# Patient Record
Sex: FEMALE | Race: WHITE | NOT HISPANIC OR LATINO
[De-identification: names, ages, dates, MRNs, and addresses within clinical notes are randomized per-mention and may not be internally consistent; named-entity substitution may affect disease eponyms.]

---

## 2017-03-27 ENCOUNTER — APPOINTMENT (OUTPATIENT)
Dept: DERMATOLOGY | Facility: CLINIC | Age: 32
End: 2017-03-27

## 2017-12-22 ENCOUNTER — RESULT REVIEW (OUTPATIENT)
Age: 32
End: 2017-12-22

## 2019-02-06 ENCOUNTER — OUTPATIENT (OUTPATIENT)
Dept: OUTPATIENT SERVICES | Facility: HOSPITAL | Age: 34
LOS: 1 days | End: 2019-02-06
Payer: COMMERCIAL

## 2019-02-06 VITALS
HEIGHT: 64.5 IN | WEIGHT: 128.53 LBS | HEART RATE: 54 BPM | RESPIRATION RATE: 20 BRPM | SYSTOLIC BLOOD PRESSURE: 85 MMHG | DIASTOLIC BLOOD PRESSURE: 52 MMHG | TEMPERATURE: 97 F

## 2019-02-06 DIAGNOSIS — Z01.818 ENCOUNTER FOR OTHER PREPROCEDURAL EXAMINATION: ICD-10-CM

## 2019-02-06 DIAGNOSIS — Z29.9 ENCOUNTER FOR PROPHYLACTIC MEASURES, UNSPECIFIED: ICD-10-CM

## 2019-02-06 DIAGNOSIS — Z98.890 OTHER SPECIFIED POSTPROCEDURAL STATES: Chronic | ICD-10-CM

## 2019-02-06 DIAGNOSIS — O02.1 MISSED ABORTION: ICD-10-CM

## 2019-02-06 LAB
ANION GAP SERPL CALC-SCNC: 13 MMOL/L — SIGNIFICANT CHANGE UP (ref 5–17)
APTT BLD: 36.4 SEC — HIGH (ref 27.5–36.3)
BASOPHILS # BLD AUTO: 0 K/UL — SIGNIFICANT CHANGE UP (ref 0–0.2)
BASOPHILS NFR BLD AUTO: 0.3 % — SIGNIFICANT CHANGE UP (ref 0–2)
BLD GP AB SCN SERPL QL: SIGNIFICANT CHANGE UP
BUN SERPL-MCNC: 8 MG/DL — SIGNIFICANT CHANGE UP (ref 8–20)
CALCIUM SERPL-MCNC: 9.7 MG/DL — SIGNIFICANT CHANGE UP (ref 8.6–10.2)
CHLORIDE SERPL-SCNC: 102 MMOL/L — SIGNIFICANT CHANGE UP (ref 98–107)
CO2 SERPL-SCNC: 25 MMOL/L — SIGNIFICANT CHANGE UP (ref 22–29)
COMMENT - BLOOD BANK: SIGNIFICANT CHANGE UP
CREAT SERPL-MCNC: 0.52 MG/DL — SIGNIFICANT CHANGE UP (ref 0.5–1.3)
EOSINOPHIL # BLD AUTO: 0.1 K/UL — SIGNIFICANT CHANGE UP (ref 0–0.5)
EOSINOPHIL NFR BLD AUTO: 1.1 % — SIGNIFICANT CHANGE UP (ref 0–6)
GLUCOSE SERPL-MCNC: 76 MG/DL — SIGNIFICANT CHANGE UP (ref 70–115)
HCT VFR BLD CALC: 37.7 % — SIGNIFICANT CHANGE UP (ref 37–47)
HGB BLD-MCNC: 12.3 G/DL — SIGNIFICANT CHANGE UP (ref 12–16)
INR BLD: 1.05 RATIO — SIGNIFICANT CHANGE UP (ref 0.88–1.16)
LYMPHOCYTES # BLD AUTO: 1.1 K/UL — SIGNIFICANT CHANGE UP (ref 1–4.8)
LYMPHOCYTES # BLD AUTO: 17.1 % — LOW (ref 20–55)
MCHC RBC-ENTMCNC: 29.8 PG — SIGNIFICANT CHANGE UP (ref 27–31)
MCHC RBC-ENTMCNC: 32.6 G/DL — SIGNIFICANT CHANGE UP (ref 32–36)
MCV RBC AUTO: 91.3 FL — SIGNIFICANT CHANGE UP (ref 81–99)
MONOCYTES # BLD AUTO: 0.5 K/UL — SIGNIFICANT CHANGE UP (ref 0–0.8)
MONOCYTES NFR BLD AUTO: 8.2 % — SIGNIFICANT CHANGE UP (ref 3–10)
NEUTROPHILS # BLD AUTO: 4.7 K/UL — SIGNIFICANT CHANGE UP (ref 1.8–8)
NEUTROPHILS NFR BLD AUTO: 73.1 % — HIGH (ref 37–73)
PLATELET # BLD AUTO: 322 K/UL — SIGNIFICANT CHANGE UP (ref 150–400)
POTASSIUM SERPL-MCNC: 3.9 MMOL/L — SIGNIFICANT CHANGE UP (ref 3.5–5.3)
POTASSIUM SERPL-SCNC: 3.9 MMOL/L — SIGNIFICANT CHANGE UP (ref 3.5–5.3)
PROTHROM AB SERPL-ACNC: 12.1 SEC — SIGNIFICANT CHANGE UP (ref 10–12.9)
RBC # BLD: 4.13 M/UL — LOW (ref 4.4–5.2)
RBC # FLD: 13.2 % — SIGNIFICANT CHANGE UP (ref 11–15.6)
SODIUM SERPL-SCNC: 140 MMOL/L — SIGNIFICANT CHANGE UP (ref 135–145)
TYPE + AB SCN PNL BLD: SIGNIFICANT CHANGE UP
WBC # BLD: 6.4 K/UL — SIGNIFICANT CHANGE UP (ref 4.8–10.8)
WBC # FLD AUTO: 6.4 K/UL — SIGNIFICANT CHANGE UP (ref 4.8–10.8)

## 2019-02-06 PROCEDURE — 85730 THROMBOPLASTIN TIME PARTIAL: CPT

## 2019-02-06 PROCEDURE — 80048 BASIC METABOLIC PNL TOTAL CA: CPT

## 2019-02-06 PROCEDURE — 85610 PROTHROMBIN TIME: CPT

## 2019-02-06 PROCEDURE — 85027 COMPLETE CBC AUTOMATED: CPT

## 2019-02-06 PROCEDURE — 86900 BLOOD TYPING SEROLOGIC ABO: CPT

## 2019-02-06 PROCEDURE — G0463: CPT

## 2019-02-06 PROCEDURE — 86850 RBC ANTIBODY SCREEN: CPT

## 2019-02-06 PROCEDURE — 86901 BLOOD TYPING SEROLOGIC RH(D): CPT

## 2019-02-06 PROCEDURE — 36415 COLL VENOUS BLD VENIPUNCTURE: CPT

## 2019-02-06 RX ORDER — SODIUM CHLORIDE 9 MG/ML
3 INJECTION INTRAMUSCULAR; INTRAVENOUS; SUBCUTANEOUS ONCE
Refills: 0 | Status: DISCONTINUED | OUTPATIENT
Start: 2019-02-11 | End: 2019-02-11

## 2019-02-06 NOTE — H&P PST ADULT - ASSESSMENT
OPIOID RISK TOOL    CHAYITO EACH BOX THAT APPLIES AND ADD TOTALS AT THE END    FAMILY HISTORY OF SUBSTANCE ABUSE                 FEMALE         MALE                                                Alcohol                             [  ]1 pt          [  ]3pts                                               Illegal Drugs                     [  ]2 pts        [  ]3pts                                               Rx Drugs                           [  ]4 pts        [  ]4 pts    PERSONAL HISTORY OF SUBSTANCE ABUSE                                                                                          Alcohol                             [  ]3 pts       [  ]3 pts                                               Illegal Drugs                     [  ]4 pts        [  ]4 pts                                               Rx Drugs                           [  ]5 pts        [  ]5 pts    AGE BETWEEN 16-45 YEARS                                      [ x ]1 pt         [  ]1 pt    HISTORY OF PREADOLESCENT   SEXUAL ABUSE                                                             [  ]3 pts        [  ]0pts    PSYCHOLOGICAL DISEASE                     ADD, OCD, Bipolar, Schizophrenia        [  ]2 pts         [  ]2 pts                      Depression                                               [  ]1 pt           [  ]1 pt           SCORING TOTAL   (add numbers and type here)              (1)                                     A score of 3 or lower indicated LOW risk for future opioid abuse  A score of 4 to 7 indicated moderate risk for future opioid abuse  A score of 8 or higher indicates a high risk for opioid abuse Pt is a 33 y.o female with no significant PMH of suction dilation and curettage. Hold any medications containing ibuprofen and aspirin 1 week prior to surgery.  CAPRINI VTE 2.0 SCORE [CLOT updated 2019]    AGE RELATED RISK FACTORS                                                       MOBILITY RELATED FACTORS  [ ] Age 41-60 years                                            (1 Point)                    [ ] Bed rest                                                        (1 Point)  [ ] Age: 61-74 years                                           (2 Points)                  [ ] Plaster cast                                                   (2 Points)  [ ] Age= 75 years                                              (3 Points)                    [ ] Bed bound for more than 72 hours                 (2 Points)    DISEASE RELATED RISK FACTORS                                               GENDER SPECIFIC FACTORS  [ ] Edema in the lower extremities                       (1 Point)              [x ] Pregnancy                                                     (1 Point)  [ ] Varicose veins                                               (1 Point)                     [ ] Post-partum < 6 weeks                                   (1 Point)             [ ] BMI > 25 Kg/m2                                            (1 Point)                     [ ] Hormonal therapy  or oral contraception          (1 Point)                 [ ] Sepsis (in the previous month)                        (1 Point)               [ ] History of pregnancy complications                 (1 point)  [ ] Pneumonia or serious lung disease                                               [ ] Unexplained or recurrent                     (1 Point)           (in the previous month)                               (1 Point)  [ ] Abnormal pulmonary function test                     (1 Point)                 SURGERY RELATED RISK FACTORS  [ ] Acute myocardial infarction                              (1 Point)               [ ]  Section                                             (1 Point)  [ ] Congestive heart failure (in the previous month)  (1 Point)      [x ] Minor surgery                                                  (1 Point)   [ ] Inflammatory bowel disease                             (1 Point)               [ ] Arthroscopic surgery                                        (2 Points)  [ ] Central venous access                                      (2 Points)                [ ] General surgery lasting more than 45 minutes (2 points)  [ ] Malignancy- Present or previous                   (2 Points)                [ ] Elective arthroplasty                                         (5 points)    [ ] Stroke (in the previous month)                          (5 Points)                                                                                                                                                           HEMATOLOGY RELATED FACTORS                                                 TRAUMA RELATED RISK FACTORS  [ ] Prior episodes of VTE                                     (3 Points)                [ ] Fracture of the hip, pelvis, or leg                       (5 Points)  [ ] Positive family history for VTE                         (3 Points)             [ ] Acute spinal cord injury (in the previous month)  (5 Points)  [ ] Prothrombin 81156 A                                     (3 Points)               [ ] Paralysis  (less than 1 month)                             (5 Points)  [ ] Factor V Leiden                                             (3 Points)                  [ ] Multiple Trauma within 1 month                        (5 Points)  [ ] Lupus anticoagulants                                     (3 Points)                                                           [ ] Anticardiolipin antibodies                               (3 Points)                                                       [ ] High homocysteine in the blood                      (3 Points)                                             [ ] Other congenital or acquired thrombophilia      (3 Points)                                                [ ] Heparin induced thrombocytopenia                  (3 Points)                                     Total Score [    2      ]  OPIOID RISK TOOL    CHAYITO EACH BOX THAT APPLIES AND ADD TOTALS AT THE END    FAMILY HISTORY OF SUBSTANCE ABUSE                 FEMALE         MALE                                                Alcohol                             [  ]1 pt          [  ]3pts                                               Illegal Drugs                     [  ]2 pts        [  ]3pts                                               Rx Drugs                           [  ]4 pts        [  ]4 pts    PERSONAL HISTORY OF SUBSTANCE ABUSE                                                                                          Alcohol                             [  ]3 pts       [  ]3 pts                                               Illegal Drugs                     [  ]4 pts        [  ]4 pts                                               Rx Drugs                           [  ]5 pts        [  ]5 pts    AGE BETWEEN 16-45 YEARS                                      [ x ]1 pt         [  ]1 pt    HISTORY OF PREADOLESCENT   SEXUAL ABUSE                                                             [  ]3 pts        [  ]0pts    PSYCHOLOGICAL DISEASE                     ADD, OCD, Bipolar, Schizophrenia        [  ]2 pts         [  ]2 pts                      Depression                                               [  ]1 pt           [  ]1 pt           SCORING TOTAL   (add numbers and type here)              (1)                                     A score of 3 or lower indicated LOW risk for future opioid abuse  A score of 4 to 7 indicated moderate risk for future opioid abuse  A score of 8 or higher indicates a high risk for opioid abuse

## 2019-02-06 NOTE — H&P PST ADULT - HISTORY OF PRESENT ILLNESS
Pt is a 33 y.o female  which is measuring 7 weeks gestation on ultrasound instead of 9 weeks now schedule for suction dilation and curettage.

## 2019-02-06 NOTE — ASU PATIENT PROFILE, ADULT - LEARNING ASSESSMENT (PATIENT) ADDITIONAL COMMENTS
Instructed pt on pre-op instructions, tips for safer surgery, pain management scale, understanding of all instructions verbalized.

## 2019-02-10 ENCOUNTER — TRANSCRIPTION ENCOUNTER (OUTPATIENT)
Age: 34
End: 2019-02-10

## 2019-02-11 ENCOUNTER — RESULT REVIEW (OUTPATIENT)
Age: 34
End: 2019-02-11

## 2019-02-11 ENCOUNTER — OUTPATIENT (OUTPATIENT)
Dept: OUTPATIENT SERVICES | Facility: HOSPITAL | Age: 34
LOS: 1 days | End: 2019-02-11
Payer: COMMERCIAL

## 2019-02-11 VITALS
RESPIRATION RATE: 16 BRPM | WEIGHT: 126.99 LBS | TEMPERATURE: 97 F | OXYGEN SATURATION: 100 % | HEIGHT: 62 IN | DIASTOLIC BLOOD PRESSURE: 50 MMHG | HEART RATE: 66 BPM | SYSTOLIC BLOOD PRESSURE: 104 MMHG

## 2019-02-11 VITALS
RESPIRATION RATE: 18 BRPM | HEART RATE: 65 BPM | DIASTOLIC BLOOD PRESSURE: 60 MMHG | OXYGEN SATURATION: 100 % | SYSTOLIC BLOOD PRESSURE: 101 MMHG

## 2019-02-11 DIAGNOSIS — O02.1 MISSED ABORTION: ICD-10-CM

## 2019-02-11 DIAGNOSIS — Z01.818 ENCOUNTER FOR OTHER PREPROCEDURAL EXAMINATION: ICD-10-CM

## 2019-02-11 DIAGNOSIS — Z98.890 OTHER SPECIFIED POSTPROCEDURAL STATES: Chronic | ICD-10-CM

## 2019-02-11 LAB
TYPE + AB SCN PNL BLD: SIGNIFICANT CHANGE UP
TYPE + AB SCN PNL BLD: SIGNIFICANT CHANGE UP

## 2019-02-11 PROCEDURE — 86901 BLOOD TYPING SEROLOGIC RH(D): CPT

## 2019-02-11 PROCEDURE — 59820 CARE OF MISCARRIAGE: CPT

## 2019-02-11 PROCEDURE — 88305 TISSUE EXAM BY PATHOLOGIST: CPT | Mod: 26

## 2019-02-11 PROCEDURE — 88305 TISSUE EXAM BY PATHOLOGIST: CPT

## 2019-02-11 PROCEDURE — 86900 BLOOD TYPING SEROLOGIC ABO: CPT

## 2019-02-11 PROCEDURE — 86850 RBC ANTIBODY SCREEN: CPT

## 2019-02-11 RX ORDER — SODIUM CHLORIDE 9 MG/ML
1000 INJECTION, SOLUTION INTRAVENOUS
Refills: 0 | Status: DISCONTINUED | OUTPATIENT
Start: 2019-02-11 | End: 2019-02-11

## 2019-02-11 RX ORDER — METOCLOPRAMIDE HCL 10 MG
10 TABLET ORAL ONCE
Refills: 0 | Status: DISCONTINUED | OUTPATIENT
Start: 2019-02-11 | End: 2019-02-11

## 2019-02-11 RX ORDER — FENTANYL CITRATE 50 UG/ML
50 INJECTION INTRAVENOUS
Refills: 0 | Status: DISCONTINUED | OUTPATIENT
Start: 2019-02-11 | End: 2019-02-11

## 2019-02-11 RX ORDER — FENTANYL CITRATE 50 UG/ML
25 INJECTION INTRAVENOUS
Refills: 0 | Status: DISCONTINUED | OUTPATIENT
Start: 2019-02-11 | End: 2019-02-11

## 2019-02-11 RX ORDER — ONDANSETRON 8 MG/1
4 TABLET, FILM COATED ORAL ONCE
Refills: 0 | Status: DISCONTINUED | OUTPATIENT
Start: 2019-02-11 | End: 2019-02-11

## 2019-02-11 RX ORDER — OXYCODONE AND ACETAMINOPHEN 5; 325 MG/1; MG/1
1 TABLET ORAL
Qty: 8 | Refills: 0
Start: 2019-02-11 | End: 2019-02-12

## 2019-02-11 RX ORDER — IBUPROFEN 200 MG
1 TABLET ORAL
Qty: 28 | Refills: 0
Start: 2019-02-11 | End: 2019-02-17

## 2019-02-11 NOTE — BRIEF OPERATIVE NOTE - PROCEDURE
<<-----Click on this checkbox to enter Procedure Dilation and curettage of uterus for missed   2019    Active  [id number]

## 2019-02-11 NOTE — ASU DISCHARGE PLAN (ADULT/PEDIATRIC). - ACTIVITY LEVEL
exercise/no heavy lifting/weight bearing as tolerated/nothing per rectum/nothing per vagina/no tub baths/no douching/no tampons/no intercourse

## 2019-02-11 NOTE — ASU DISCHARGE PLAN (ADULT/PEDIATRIC). - NOTIFY
Bleeding that does not stop/Swelling that continues/Numbness, color, or temperature change to extremity/Persistent Nausea and Vomiting/Unable to Urinate/Pain not relieved by Medications/GYN Fever>100.4/Inability to Tolerate Liquids or Foods/Increased Irritability or Sluggishness

## 2019-02-11 NOTE — ASU DISCHARGE PLAN (ADULT/PEDIATRIC). - MEDICATION SUMMARY - MEDICATIONS TO TAKE
I will START or STAY ON the medications listed below when I get home from the hospital:    ibuprofen 600 mg oral tablet  -- 1 tab(s) by mouth every 6 hours, As Needed -for mild pain   -- Do not take this drug if you are pregnant.  It is very important that you take or use this exactly as directed.  Do not skip doses or discontinue unless directed by your doctor.  May cause drowsiness or dizziness.  Obtain medical advice before taking any non-prescription drugs as some may affect the action of this medication.  Take with food or milk.    -- Indication: For Mild pain    oxyCODONE-acetaminophen 5 mg-325 mg oral tablet  -- 1 tab(s) by mouth every 6 hours, As Needed -for moderate pain MDD:4 tabs  -- Caution federal law prohibits the transfer of this drug to any person other  than the person for whom it was prescribed.  May cause drowsiness.  Alcohol may intensify this effect.  Use care when operating dangerous machinery.  This prescription cannot be refilled.  This product contains acetaminophen.  Do not use  with any other product containing acetaminophen to prevent possible liver damage.  Using more of this medication than prescribed may cause serious breathing problems.    -- Indication: For severe pain

## 2019-02-13 LAB
SURGICAL PATHOLOGY STUDY: SIGNIFICANT CHANGE UP

## 2019-07-26 ENCOUNTER — RESULT REVIEW (OUTPATIENT)
Age: 34
End: 2019-07-26

## 2019-11-08 ENCOUNTER — OUTPATIENT (OUTPATIENT)
Dept: OUTPATIENT SERVICES | Facility: HOSPITAL | Age: 34
LOS: 1 days | End: 2019-11-08
Payer: COMMERCIAL

## 2019-11-08 VITALS — HEART RATE: 77 BPM | SYSTOLIC BLOOD PRESSURE: 112 MMHG | DIASTOLIC BLOOD PRESSURE: 56 MMHG

## 2019-11-08 VITALS — DIASTOLIC BLOOD PRESSURE: 56 MMHG | HEART RATE: 83 BPM | SYSTOLIC BLOOD PRESSURE: 110 MMHG

## 2019-11-08 DIAGNOSIS — Z98.890 OTHER SPECIFIED POSTPROCEDURAL STATES: Chronic | ICD-10-CM

## 2019-11-08 DIAGNOSIS — O47.03 FALSE LABOR BEFORE 37 COMPLETED WEEKS OF GESTATION, THIRD TRIMESTER: ICD-10-CM

## 2019-11-08 PROCEDURE — G0463: CPT

## 2019-11-08 PROCEDURE — 59025 FETAL NON-STRESS TEST: CPT

## 2019-11-08 NOTE — OB PROVIDER TRIAGE NOTE - HISTORY OF PRESENT ILLNESS
35yo  @ 28.3w who comes into triage with a complaint of decreased fetal movement. Pt reports no contractions, vaginal bleeding, loss of fluids. Pt reports +FM. Pregnancy has been uncomplicated.

## 2019-11-08 NOTE — OB PROVIDER TRIAGE NOTE - NSOBPROVIDERNOTE_OBGYN_ALL_OB_FT
35yo  here for decreased fetal movement. NST reactive. REviewed by Dr. Briggs. Plan reviewed with Dr. Cui. Pt can be discharged.

## 2019-11-08 NOTE — OB RN TRIAGE NOTE - PT NEEDS ASSIST
reviewed. Allergies: Patient has no known allergies. -------------------------------------------------- RESULTS -------------------------------------------------  No results found for this visit on 08/09/19. No orders to display       ------------------------- NURSING NOTES AND VITALS REVIEWED ---------------------------   The nursing notes within the ED encounter and vital signs as below have been reviewed. BP (!) 153/78   Pulse 88   Temp 97.3 °F (36.3 °C) (Oral)   Resp 20   Ht 5' 8\" (1.727 m)   Wt 185 lb (83.9 kg)   SpO2 95%   BMI 28.13 kg/m²   Oxygen Saturation Interpretation: Normal      ------------------------------------------ PROGRESS NOTES ------------------------------------------   I have spoken with the patient and discussed todays results, in addition to providing specific details for the plan of care and counseling regarding the diagnosis and prognosis. Their questions are answered at this time and they are agreeable with the plan.      --------------------------------- ADDITIONAL PROVIDER NOTES ---------------------------------        This patient is stable for discharge. I have shared the specific conditions for return, as well as the importance of follow-up. IMPRESSION:     1.  Parkinson catheter problem, initial encounter ([address]      Patient's Medications   New Prescriptions    No medications on file   Previous Medications    ASCORBIC ACID (VITAMIN C) 1000 MG TABLET    Take 1,000 mg by mouth daily    BLOOD GLUCOSE MONITORING SUPPL (ACURA BLOOD GLUCOSE METER) W/DEVICE KIT    1 Units by Does not apply route daily E11.9    CHOLECALCIFEROL (VITAMIN D3) 3000 UNITS TABS    Take 5,000 Units by mouth    LANCETS MISC    1 each by Does not apply route daily e11.9    METFORMIN (GLUCOPHAGE) 500 MG TABLET    Take 1 tablet by mouth 2 times daily (with meals)    MULTIPLE VITAMINS-MINERALS (THERAPEUTIC MULTIVITAMIN-MINERALS) TABLET    Take 1 tablet by mouth daily    OMEGA-3 FATTY ACIDS (OMEGA-3
no

## 2019-11-08 NOTE — OB RN TRIAGE NOTE - NS PRO PASSIVE SMOKE EXP
Patient presents to the clinic for follow up sore throat and fever.  Patient's mom states has been in twice over that last couple of weeks with negative strep tests.    ZANDER Ahmadi........................2/23/2018  10:11 AM     No

## 2019-12-06 ENCOUNTER — RESULT REVIEW (OUTPATIENT)
Age: 34
End: 2019-12-06

## 2020-01-28 ENCOUNTER — INPATIENT (INPATIENT)
Facility: HOSPITAL | Age: 35
LOS: 3 days | Discharge: HOME | End: 2020-02-01
Payer: COMMERCIAL

## 2020-01-28 VITALS — DIASTOLIC BLOOD PRESSURE: 55 MMHG | SYSTOLIC BLOOD PRESSURE: 123 MMHG | HEART RATE: 92 BPM

## 2020-01-28 DIAGNOSIS — Z98.890 OTHER SPECIFIED POSTPROCEDURAL STATES: Chronic | ICD-10-CM

## 2020-01-28 DIAGNOSIS — Z3A.40 40 WEEKS GESTATION OF PREGNANCY: ICD-10-CM

## 2020-01-28 DIAGNOSIS — O26.893 OTHER SPECIFIED PREGNANCY RELATED CONDITIONS, THIRD TRIMESTER: ICD-10-CM

## 2020-01-28 LAB
APPEARANCE UR: CLEAR — SIGNIFICANT CHANGE UP
BASOPHILS # BLD AUTO: 0.05 K/UL — SIGNIFICANT CHANGE UP (ref 0–0.2)
BASOPHILS NFR BLD AUTO: 0.5 % — SIGNIFICANT CHANGE UP (ref 0–2)
BILIRUB UR-MCNC: NEGATIVE — SIGNIFICANT CHANGE UP
BLD GP AB SCN SERPL QL: SIGNIFICANT CHANGE UP
COLOR SPEC: YELLOW — SIGNIFICANT CHANGE UP
DIFF PNL FLD: NEGATIVE — SIGNIFICANT CHANGE UP
EOSINOPHIL # BLD AUTO: 0.09 K/UL — SIGNIFICANT CHANGE UP (ref 0–0.5)
EOSINOPHIL NFR BLD AUTO: 0.9 % — SIGNIFICANT CHANGE UP (ref 0–6)
GLUCOSE UR QL: NEGATIVE MG/DL — SIGNIFICANT CHANGE UP
HCT VFR BLD CALC: 35.3 % — SIGNIFICANT CHANGE UP (ref 34.5–45)
HGB BLD-MCNC: 11.3 G/DL — LOW (ref 11.5–15.5)
IMM GRANULOCYTES NFR BLD AUTO: 0.5 % — SIGNIFICANT CHANGE UP (ref 0–1.5)
KETONES UR-MCNC: NEGATIVE — SIGNIFICANT CHANGE UP
LEUKOCYTE ESTERASE UR-ACNC: NEGATIVE — SIGNIFICANT CHANGE UP
LYMPHOCYTES # BLD AUTO: 1.23 K/UL — SIGNIFICANT CHANGE UP (ref 1–3.3)
LYMPHOCYTES # BLD AUTO: 12.1 % — LOW (ref 13–44)
MCHC RBC-ENTMCNC: 29.3 PG — SIGNIFICANT CHANGE UP (ref 27–34)
MCHC RBC-ENTMCNC: 32 GM/DL — SIGNIFICANT CHANGE UP (ref 32–36)
MCV RBC AUTO: 91.5 FL — SIGNIFICANT CHANGE UP (ref 80–100)
MONOCYTES # BLD AUTO: 1.22 K/UL — HIGH (ref 0–0.9)
MONOCYTES NFR BLD AUTO: 12 % — SIGNIFICANT CHANGE UP (ref 2–14)
NEUTROPHILS # BLD AUTO: 7.54 K/UL — HIGH (ref 1.8–7.4)
NEUTROPHILS NFR BLD AUTO: 74 % — SIGNIFICANT CHANGE UP (ref 43–77)
NITRITE UR-MCNC: NEGATIVE — SIGNIFICANT CHANGE UP
PH UR: 7 — SIGNIFICANT CHANGE UP (ref 5–8)
PLATELET # BLD AUTO: 379 K/UL — SIGNIFICANT CHANGE UP (ref 150–400)
PROT UR-MCNC: NEGATIVE MG/DL — SIGNIFICANT CHANGE UP
RBC # BLD: 3.86 M/UL — SIGNIFICANT CHANGE UP (ref 3.8–5.2)
RBC # FLD: 13.4 % — SIGNIFICANT CHANGE UP (ref 10.3–14.5)
SP GR SPEC: 1.01 — SIGNIFICANT CHANGE UP (ref 1.01–1.02)
UROBILINOGEN FLD QL: NEGATIVE MG/DL — SIGNIFICANT CHANGE UP
WBC # BLD: 10.18 K/UL — SIGNIFICANT CHANGE UP (ref 3.8–10.5)
WBC # FLD AUTO: 10.18 K/UL — SIGNIFICANT CHANGE UP (ref 3.8–10.5)

## 2020-01-28 RX ORDER — INFLUENZA VIRUS VACCINE 15; 15; 15; 15 UG/.5ML; UG/.5ML; UG/.5ML; UG/.5ML
0.5 SUSPENSION INTRAMUSCULAR ONCE
Refills: 0 | Status: COMPLETED | OUTPATIENT
Start: 2020-01-28 | End: 2020-01-31

## 2020-01-28 RX ORDER — OXYTOCIN 10 UNIT/ML
333.33 VIAL (ML) INJECTION
Qty: 20 | Refills: 0 | Status: DISCONTINUED | OUTPATIENT
Start: 2020-01-28 | End: 2020-02-01

## 2020-01-28 RX ORDER — ONDANSETRON 8 MG/1
4 TABLET, FILM COATED ORAL EVERY 6 HOURS
Refills: 0 | Status: DISCONTINUED | OUTPATIENT
Start: 2020-01-28 | End: 2020-02-01

## 2020-01-28 RX ORDER — MEPERIDINE HYDROCHLORIDE 50 MG/ML
75 INJECTION INTRAMUSCULAR; INTRAVENOUS; SUBCUTANEOUS EVERY 4 HOURS
Refills: 0 | Status: DISCONTINUED | OUTPATIENT
Start: 2020-01-28 | End: 2020-01-29

## 2020-01-28 RX ORDER — SODIUM CHLORIDE 9 MG/ML
1000 INJECTION, SOLUTION INTRAVENOUS
Refills: 0 | Status: DISCONTINUED | OUTPATIENT
Start: 2020-01-28 | End: 2020-01-29

## 2020-01-28 RX ORDER — CITRIC ACID/SODIUM CITRATE 300-500 MG
30 SOLUTION, ORAL ORAL ONCE
Refills: 0 | Status: COMPLETED | OUTPATIENT
Start: 2020-01-28 | End: 2020-01-29

## 2020-01-28 RX ADMIN — SODIUM CHLORIDE 125 MILLILITER(S): 9 INJECTION, SOLUTION INTRAVENOUS at 17:10

## 2020-01-28 NOTE — OB PROVIDER H&P - ATTENDING COMMENTS
As noted above,pt is admitted at 40 wks gestation for elective induction of labor.  R/B/A have been reviewed prior to admission.  Plan to proceed with Cytotec induction.

## 2020-01-28 NOTE — OB PROVIDER H&P - ASSESSMENT
Patient is a 33yo  at 40 weeks gestation c/w LMP who presents to L&D for an elective induction of labor.  - Consent  - Routine admission labs  - GBS negative  - Admit for IOL today

## 2020-01-28 NOTE — OB PROVIDER H&P - NSHPPHYSICALEXAM_GEN_ALL_CORE
ICU Vital Signs Last 24 Hrs  T(C): 36.9 (28 Jan 2020 17:05), Max: 36.9 (28 Jan 2020 17:05)  T(F): 98.4 (28 Jan 2020 17:05), Max: 98.4 (28 Jan 2020 17:05)  HR: 92 (28 Jan 2020 17:05) (92 - 92)  BP: 123/55 (28 Jan 2020 17:05) (123/55 - 123/55)  RR: 18 (28 Jan 2020 17:05) (18 - 18)      Gen: well appearing  Abd: gravid, nontender  Pelvic:    FHT: 130/mod octavia/+accels  [place]: none     Bedside sono: cephalic presentation ICU Vital Signs Last 24 Hrs  T(C): 36.9 (28 Jan 2020 17:05), Max: 36.9 (28 Jan 2020 17:05)  T(F): 98.4 (28 Jan 2020 17:05), Max: 98.4 (28 Jan 2020 17:05)  HR: 92 (28 Jan 2020 17:05) (92 - 92)  BP: 123/55 (28 Jan 2020 17:05) (123/55 - 123/55)  RR: 18 (28 Jan 2020 17:05) (18 - 18)      Gen: well appearing  Abd: gravid, nontender  Pelvic: cervix closed, soft, -3 station    FHT: 130/mod octavia/+accels  [place]: none     Bedside sono: cephalic presentation

## 2020-01-28 NOTE — OB PROVIDER H&P - HISTORY OF PRESENT ILLNESS
Patient is a 33yo  at 40 weeks gestation c/w LMP who presents to L&D for an elective induction of labor.  TIM: 2020  LMP: 2019  Prenatal course uncomplicated    OBHx:   G1: 2019 SAB  PMH: anxiety, hypothyroidism   PSH: knee surgery (), suction D&C ()   Meds: pnv, levothyroxine   ALL: NKDA Patient is a 33yo  at 40 weeks gestation c/w LMP who presents to L&D for an elective induction of labor.  TIM: 2020  LMP: 2019  Prenatal course uncomplicated    OBHx:   G1: 2019 SAB - 10 wks  PMH: anxiety, hx of hypothyroid not on Levothyroxine  PSH: knee surgery (), suction D&C ()   Meds: pnv   ALL: NKDA Patient is a 33yo  at 40 weeks gestation c/w LMP who presents to L&D for an elective induction of labor.  TIM: 2020  LMP: 2019  Prenatal course uncomplicated    OBHx:   G1: 2019 SAB - 10 wks  PMH: anxiety, hx of hypothyroid, on Levothyroxine  PSH: knee surgery (), suction D&C ()   Meds: pnv   ALL: NKDA

## 2020-01-29 ENCOUNTER — TRANSCRIPTION ENCOUNTER (OUTPATIENT)
Age: 35
End: 2020-01-29

## 2020-01-29 LAB
T PALLIDUM AB TITR SER: NEGATIVE — SIGNIFICANT CHANGE UP

## 2020-01-29 RX ORDER — NALOXONE HYDROCHLORIDE 4 MG/.1ML
0.1 SPRAY NASAL
Refills: 0 | Status: DISCONTINUED | OUTPATIENT
Start: 2020-01-29 | End: 2020-02-01

## 2020-01-29 RX ORDER — OXYCODONE HYDROCHLORIDE 5 MG/1
5 TABLET ORAL
Refills: 0 | Status: DISCONTINUED | OUTPATIENT
Start: 2020-01-29 | End: 2020-02-01

## 2020-01-29 RX ORDER — KETOROLAC TROMETHAMINE 30 MG/ML
30 SYRINGE (ML) INJECTION EVERY 6 HOURS
Refills: 0 | Status: DISCONTINUED | OUTPATIENT
Start: 2020-01-29 | End: 2020-02-01

## 2020-01-29 RX ORDER — KETOROLAC TROMETHAMINE 30 MG/ML
30 SYRINGE (ML) INJECTION ONCE
Refills: 0 | Status: DISCONTINUED | OUTPATIENT
Start: 2020-01-29 | End: 2020-02-01

## 2020-01-29 RX ORDER — AZITHROMYCIN 500 MG/1
500 TABLET, FILM COATED ORAL ONCE
Refills: 0 | Status: COMPLETED | OUTPATIENT
Start: 2020-01-29 | End: 2020-01-29

## 2020-01-29 RX ORDER — TETANUS TOXOID, REDUCED DIPHTHERIA TOXOID AND ACELLULAR PERTUSSIS VACCINE, ADSORBED 5; 2.5; 8; 8; 2.5 [IU]/.5ML; [IU]/.5ML; UG/.5ML; UG/.5ML; UG/.5ML
0.5 SUSPENSION INTRAMUSCULAR ONCE
Refills: 0 | Status: COMPLETED | OUTPATIENT
Start: 2020-01-29

## 2020-01-29 RX ORDER — ONDANSETRON 8 MG/1
4 TABLET, FILM COATED ORAL ONCE
Refills: 0 | Status: DISCONTINUED | OUTPATIENT
Start: 2020-01-29 | End: 2020-02-01

## 2020-01-29 RX ORDER — ACETAMINOPHEN 500 MG
975 TABLET ORAL
Refills: 0 | Status: DISCONTINUED | OUTPATIENT
Start: 2020-01-29 | End: 2020-02-01

## 2020-01-29 RX ORDER — CEFAZOLIN SODIUM 1 G
2000 VIAL (EA) INJECTION ONCE
Refills: 0 | Status: COMPLETED | OUTPATIENT
Start: 2020-01-29 | End: 2020-01-29

## 2020-01-29 RX ORDER — CEFAZOLIN SODIUM 1 G
2000 VIAL (EA) INJECTION EVERY 8 HOURS
Refills: 0 | Status: DISCONTINUED | OUTPATIENT
Start: 2020-01-29 | End: 2020-01-29

## 2020-01-29 RX ORDER — ACETAMINOPHEN 500 MG
1000 TABLET ORAL ONCE
Refills: 0 | Status: DISCONTINUED | OUTPATIENT
Start: 2020-01-29 | End: 2020-02-01

## 2020-01-29 RX ORDER — OXYCODONE HYDROCHLORIDE 5 MG/1
5 TABLET ORAL ONCE
Refills: 0 | Status: DISCONTINUED | OUTPATIENT
Start: 2020-01-29 | End: 2020-02-01

## 2020-01-29 RX ORDER — KETOROLAC TROMETHAMINE 30 MG/ML
30 SYRINGE (ML) INJECTION EVERY 6 HOURS
Refills: 0 | Status: DISCONTINUED | OUTPATIENT
Start: 2020-01-29 | End: 2020-01-31

## 2020-01-29 RX ORDER — ONDANSETRON 8 MG/1
4 TABLET, FILM COATED ORAL EVERY 6 HOURS
Refills: 0 | Status: DISCONTINUED | OUTPATIENT
Start: 2020-01-29 | End: 2020-02-01

## 2020-01-29 RX ORDER — SIMETHICONE 80 MG/1
80 TABLET, CHEWABLE ORAL EVERY 4 HOURS
Refills: 0 | Status: DISCONTINUED | OUTPATIENT
Start: 2020-01-29 | End: 2020-02-01

## 2020-01-29 RX ORDER — DIPHENHYDRAMINE HCL 50 MG
25 CAPSULE ORAL EVERY 6 HOURS
Refills: 0 | Status: DISCONTINUED | OUTPATIENT
Start: 2020-01-29 | End: 2020-02-01

## 2020-01-29 RX ORDER — OXYTOCIN 10 UNIT/ML
2 VIAL (ML) INJECTION
Qty: 30 | Refills: 0 | Status: DISCONTINUED | OUTPATIENT
Start: 2020-01-29 | End: 2020-01-29

## 2020-01-29 RX ORDER — SODIUM CHLORIDE 9 MG/ML
1000 INJECTION, SOLUTION INTRAVENOUS
Refills: 0 | Status: DISCONTINUED | OUTPATIENT
Start: 2020-01-29 | End: 2020-02-01

## 2020-01-29 RX ORDER — OXYTOCIN 10 UNIT/ML
333.33 VIAL (ML) INJECTION
Qty: 20 | Refills: 0 | Status: DISCONTINUED | OUTPATIENT
Start: 2020-01-29 | End: 2020-02-01

## 2020-01-29 RX ORDER — DIPHENHYDRAMINE HCL 50 MG
25 CAPSULE ORAL ONCE
Refills: 0 | Status: DISCONTINUED | OUTPATIENT
Start: 2020-01-29 | End: 2020-02-01

## 2020-01-29 RX ORDER — LANOLIN
1 OINTMENT (GRAM) TOPICAL EVERY 6 HOURS
Refills: 0 | Status: DISCONTINUED | OUTPATIENT
Start: 2020-01-29 | End: 2020-02-01

## 2020-01-29 RX ORDER — SODIUM CHLORIDE 9 MG/ML
1000 INJECTION, SOLUTION INTRAVENOUS ONCE
Refills: 0 | Status: DISCONTINUED | OUTPATIENT
Start: 2020-01-29 | End: 2020-01-29

## 2020-01-29 RX ADMIN — Medication 30 MILLIGRAM(S): at 12:52

## 2020-01-29 RX ADMIN — Medication 30 MILLIGRAM(S): at 18:09

## 2020-01-29 RX ADMIN — Medication 30 MILLIGRAM(S): at 13:05

## 2020-01-29 RX ADMIN — Medication 975 MILLIGRAM(S): at 22:30

## 2020-01-29 RX ADMIN — SODIUM CHLORIDE 125 MILLILITER(S): 9 INJECTION, SOLUTION INTRAVENOUS at 00:29

## 2020-01-29 RX ADMIN — SODIUM CHLORIDE 125 MILLILITER(S): 9 INJECTION, SOLUTION INTRAVENOUS at 18:11

## 2020-01-29 RX ADMIN — Medication 975 MILLIGRAM(S): at 21:51

## 2020-01-29 RX ADMIN — Medication 100 MILLIGRAM(S): at 12:51

## 2020-01-29 RX ADMIN — SODIUM CHLORIDE 125 MILLILITER(S): 9 INJECTION, SOLUTION INTRAVENOUS at 08:48

## 2020-01-29 RX ADMIN — Medication 30 MILLIGRAM(S): at 17:49

## 2020-01-29 RX ADMIN — Medication 30 MILLILITER(S): at 08:47

## 2020-01-29 RX ADMIN — AZITHROMYCIN 255 MILLIGRAM(S): 500 TABLET, FILM COATED ORAL at 09:33

## 2020-01-29 RX ADMIN — SODIUM CHLORIDE 125 MILLILITER(S): 9 INJECTION, SOLUTION INTRAVENOUS at 05:31

## 2020-01-29 RX ADMIN — Medication 1000 MILLIUNIT(S)/MIN: at 15:18

## 2020-01-29 NOTE — OB RN DELIVERY SUMMARY - NS_SPECIMENS_OBGYN_ALL_OB
Subjective:       Patient ID: Nannette Broussard is a 64 y.o. female.    Chief Complaint: Dizziness (2nd Back surgery; Pt state feeling back pressure) and Medication Problem (Gabapentin; possibly over medication )  HISTORY OF PRESENT ILLNESS:  This is a 64-year-old white female who has been   having problems with back injury, had her second surgery at [facility] with Dr. Farias on 09/05/2019.  She a few days later developed   dizziness with vertigo-type feeling that lasted about 7 to 10 days.  It is   intermittent and tends to occur with change in position, even lying down or   rolling over in bed.  Last three days, it has returned but is brief and not   severe.  She has felt nauseated on occasion, no vomiting.  She has had no   headache or ear pain.  She is admitted after I looked by using an end of a brush   to probe her ear.    PHYSICAL EXAMINATION:  VITAL SIGNS:  Normal.  HEENT:  The ears are both blocked with moist wax.  She has nonsustained   nystagmus in both directions.  MUSCULOSKELETAL:  Her back scar looks excellent.    IMPRESSION AND PLAN:  The patient's chart was reviewed.  She needed a flu shot,   which was given.  She was sent to ENT for somewhat urgent evacuation of wax and   she is due colonoscopy, so that is being ordered.      DEEPAK/RICHMOND  dd: 10/01/2019 08:33:08 (CDT)  td: 10/01/2019 21:49:56 (CDT)  Doc ID   #[id number]  Job ID #[id number]    CC:     Dict [id number]  HPI  Review of Systems   Constitutional: Negative.    HENT: Negative for congestion, hearing loss, sinus pressure, sneezing, sore throat and voice change.    Eyes: Negative for visual disturbance.   Respiratory: Negative for cough, chest tightness and shortness of breath.    Cardiovascular: Negative.  Negative for chest pain, palpitations and leg swelling.   Gastrointestinal: Negative.    Genitourinary: Negative for difficulty urinating, dysuria, flank pain, frequency, hematuria, menstrual problem, pelvic pain, urgency, vaginal  bleeding and vaginal discharge.   Musculoskeletal: Positive for back pain. Negative for neck pain and neck stiffness.   Skin: Negative.    Neurological: Positive for dizziness. Negative for seizures, light-headedness, numbness and headaches.   Psychiatric/Behavioral: Negative for agitation, behavioral problems, confusion and sleep disturbance. The patient is not nervous/anxious.        Objective:      Physical Exam   Constitutional: She is oriented to person, place, and time. She appears well-developed and well-nourished.   HENT:   Head: Normocephalic.   Nose: Nose normal.   Mouth/Throat: Oropharynx is clear and moist.   Eyes: Pupils are equal, round, and reactive to light. EOM are normal.   Neck: Normal range of motion. Neck supple. No JVD present. No thyromegaly present.   Cardiovascular: Normal rate, regular rhythm, normal heart sounds and intact distal pulses. Exam reveals no gallop.   No murmur heard.  Pulmonary/Chest: Breath sounds normal. She has no wheezes. She has no rales.   Abdominal: Soft. Bowel sounds are normal. She exhibits no mass. There is no tenderness.   Musculoskeletal: Normal range of motion.   Lymphadenopathy:     She has no cervical adenopathy.   Neurological: She is alert and oriented to person, place, and time. She has normal reflexes. No cranial nerve deficit.   Skin: No rash noted. No erythema.   Psychiatric: She has a normal mood and affect. Judgment normal.       Assessment:       1. Vertigo    2. Bilateral impacted cerumen    3. Status post lumbar surgery    4. Essential hypertension        Plan:            No

## 2020-01-29 NOTE — OB PROVIDER DELIVERY SUMMARY - NSPROVIDERDELIVERYNOTE_OBGYN_ALL_OB_FT
Pt taken to the OR for primary C/S due to NRFHT remote from delivery.  Delivered live male infant, VTX (asynclitic), at 9:32am.  No nuchal cord, fluid clear.  Cord short.  [place]: male, weight 7lb 1oz, 3190g.  APGAR 9/9.  Uterus, tubes, ovaries WNL.  No intraoperative complications.  EBL 700cc, urine output 200cc, clear.  Skin-to-skin initiated in OR and continued into RR.

## 2020-01-29 NOTE — DISCHARGE NOTE OB - HOSPITAL COURSE
POD#3 s/p STAT pCS of a viable male infant for NRFHT after eIOL. Patient transferred to post partum unit, uncomplicated hospital course. At the time of discharge patient was tolerating regular diet PO, ambulating, voiding, and demonstrating bowel function. Pain well controlled with pain medications PRN.

## 2020-01-29 NOTE — CHART NOTE - NSCHARTNOTEFT_GEN_A_CORE
Patient seen at bedside for 2 decelerations  She was repositioned onto her right side  tracing is now category I

## 2020-01-29 NOTE — CHART NOTE - NSCHARTNOTEFT_GEN_A_CORE
Patient had 5 minute FH deceleration with return to baseline  Followed by an additional 2 minute deceleration  VE: 1/90/-1  FHT now category I  [place]: cxts q 2 mins  Will continue to monitor     D/W Dr. Cui

## 2020-01-29 NOTE — OB NEONATOLOGY/PEDIATRICIAN DELIVERY SUMMARY - NSPEDSNEONOTESA_OBGYN_ALL_OB_FT
Baby Zheng Saenz is a 40.1 wk term  born at 0932 on 20 via urgent C/S for NRFHTs to 33 yo  A+/ GBS neg/ RPR NR/ HIV neg/ HepBSAg neg/ RI mom with EDC 20.  Mom had good prenatal care.  No significant complications with pregnancy.  L&D: ROM x 2 h PTD; afebrile mom.  C/S under regional anesthesia for NRFHTs.  Upon delivery clear AF; vertex.  Spontaneous cry.  Routine care.  AS .  A/P: Term  delivered by C/S.  Observe for respiratory distress.  Transition to routine care under PMD service.  Rolly Paez MD

## 2020-01-29 NOTE — OB RN DELIVERY SUMMARY - NS_SEPSISRSKCALC_OBGYN_ALL_OB_FT
EOS calculated successfully. EOS Risk Factor: 0.5/1000 live births ([facility] national incidence); GA=40w1d; Temp=98.7; ROM=2; GBS='Negative'; Antibiotics='No antibiotics or any antibiotics < 2 hrs prior to birth'

## 2020-01-29 NOTE — DISCHARGE NOTE OB - CARE PLAN
Principal Discharge DX:	 delivery delivered  Goal:	Healthy mother and baby  Assessment and plan of treatment:	1) Please take ibuprofen and other prescribed medications as needed for pain.  2) Nothing in the vagina for 6 weeks (including no sex, no tampons, and no douching).  3) No tub baths or pools for 2 weeks. Showers are okay. Please keep your incision dry until your follow up appointment.  4) Please call your doctor for a follow up appointment  5) Please call the office sooner if you have heavy vaginal bleeding, severe abdominal pain, or fever over 100.4F.

## 2020-01-29 NOTE — DISCHARGE NOTE OB - PATIENT PORTAL LINK FT
You can access the FollowMyHealth Patient Portal offered by [facility] by registering at the following website: [email or web address]. By joining [email or web address] FollowMyHealth portal, you will also be able to view your health information using other applications (apps) compatible with our system.

## 2020-01-29 NOTE — CHART NOTE - NSCHARTNOTEFT_GEN_A_CORE
eIOL at 40w  FHT: cat I  [place]: cxts irreg  Due for 2nd dose of cytotec 60  Continue present management

## 2020-01-29 NOTE — DISCHARGE NOTE OB - MATERIALS PROVIDED
Vaccinations/[facility]  Screening Program/  Immunization Record/Breastfeeding Mother’s Support Group Information/Guide to Postpartum Care/[facility] Hearing Screen Program/Back To Sleep Handout/Shaken Baby Prevention Handout/Breastfeeding Guide and Packet/Birth Certificate Instructions/Discharge Medication Information for Patients and Families Pocket Guide/Tdap Vaccination (VIS Pub Date: 2012)

## 2020-01-29 NOTE — DISCHARGE NOTE OB - CARE PROVIDER_API CALL
Clementine Cui)  Obstetrics and Gynecology  [address] [place] [address]  Phone: [phone]  Fax: [phone]  Follow Up Time: Clementine Cui)  Obstetrics and Gynecology  [address] [place] [address]  Phone: [phone]  Fax: [phone]  Follow Up Time:

## 2020-01-30 LAB
BASOPHILS # BLD AUTO: 0.04 K/UL — SIGNIFICANT CHANGE UP (ref 0–0.2)
BASOPHILS NFR BLD AUTO: 0.2 % — SIGNIFICANT CHANGE UP (ref 0–2)
EOSINOPHIL # BLD AUTO: 0.03 K/UL — SIGNIFICANT CHANGE UP (ref 0–0.5)
EOSINOPHIL NFR BLD AUTO: 0.2 % — SIGNIFICANT CHANGE UP (ref 0–6)
HCT VFR BLD CALC: 29.2 % — LOW (ref 34.5–45)
HGB BLD-MCNC: 9.5 G/DL — LOW (ref 11.5–15.5)
IMM GRANULOCYTES NFR BLD AUTO: 0.6 % — SIGNIFICANT CHANGE UP (ref 0–1.5)
LYMPHOCYTES # BLD AUTO: 1.9 K/UL — SIGNIFICANT CHANGE UP (ref 1–3.3)
LYMPHOCYTES # BLD AUTO: 11 % — LOW (ref 13–44)
MCHC RBC-ENTMCNC: 30.2 PG — SIGNIFICANT CHANGE UP (ref 27–34)
MCHC RBC-ENTMCNC: 32.5 GM/DL — SIGNIFICANT CHANGE UP (ref 32–36)
MCV RBC AUTO: 92.7 FL — SIGNIFICANT CHANGE UP (ref 80–100)
MONOCYTES # BLD AUTO: 1.85 K/UL — HIGH (ref 0–0.9)
MONOCYTES NFR BLD AUTO: 10.7 % — SIGNIFICANT CHANGE UP (ref 2–14)
NEUTROPHILS # BLD AUTO: 13.39 K/UL — HIGH (ref 1.8–7.4)
NEUTROPHILS NFR BLD AUTO: 77.3 % — HIGH (ref 43–77)
PLATELET # BLD AUTO: 309 K/UL — SIGNIFICANT CHANGE UP (ref 150–400)
RBC # BLD: 3.15 M/UL — LOW (ref 3.8–5.2)
RBC # FLD: 13.5 % — SIGNIFICANT CHANGE UP (ref 10.3–14.5)
WBC # BLD: 17.31 K/UL — HIGH (ref 3.8–10.5)
WBC # FLD AUTO: 17.31 K/UL — HIGH (ref 3.8–10.5)

## 2020-01-30 RX ADMIN — Medication 30 MILLIGRAM(S): at 00:18

## 2020-01-30 RX ADMIN — Medication 30 MILLIGRAM(S): at 13:01

## 2020-01-30 RX ADMIN — Medication 30 MILLIGRAM(S): at 13:16

## 2020-01-30 RX ADMIN — Medication 975 MILLIGRAM(S): at 21:47

## 2020-01-30 RX ADMIN — Medication 30 MILLIGRAM(S): at 05:57

## 2020-01-30 RX ADMIN — Medication 30 MILLIGRAM(S): at 00:33

## 2020-01-30 RX ADMIN — Medication 30 MILLIGRAM(S): at 17:57

## 2020-01-30 RX ADMIN — Medication 30 MILLIGRAM(S): at 18:12

## 2020-01-30 RX ADMIN — Medication 30 MILLIGRAM(S): at 06:12

## 2020-01-30 RX ADMIN — Medication 975 MILLIGRAM(S): at 22:45

## 2020-01-31 RX ORDER — IBUPROFEN 200 MG
600 TABLET ORAL EVERY 6 HOURS
Refills: 0 | Status: DISCONTINUED | OUTPATIENT
Start: 2020-01-31 | End: 2020-02-01

## 2020-01-31 RX ORDER — TETANUS TOXOID, REDUCED DIPHTHERIA TOXOID AND ACELLULAR PERTUSSIS VACCINE, ADSORBED 5; 2.5; 8; 8; 2.5 [IU]/.5ML; [IU]/.5ML; UG/.5ML; UG/.5ML; UG/.5ML
0.5 SUSPENSION INTRAMUSCULAR ONCE
Refills: 0 | Status: COMPLETED | OUTPATIENT
Start: 2020-01-31 | End: 2020-01-31

## 2020-01-31 RX ADMIN — Medication 975 MILLIGRAM(S): at 05:00

## 2020-01-31 RX ADMIN — Medication 975 MILLIGRAM(S): at 10:30

## 2020-01-31 RX ADMIN — Medication 975 MILLIGRAM(S): at 15:28

## 2020-01-31 RX ADMIN — Medication 975 MILLIGRAM(S): at 21:48

## 2020-01-31 RX ADMIN — Medication 600 MILLIGRAM(S): at 17:54

## 2020-01-31 RX ADMIN — Medication 975 MILLIGRAM(S): at 15:58

## 2020-01-31 RX ADMIN — Medication 975 MILLIGRAM(S): at 04:16

## 2020-01-31 RX ADMIN — Medication 30 MILLIGRAM(S): at 05:35

## 2020-01-31 RX ADMIN — Medication 975 MILLIGRAM(S): at 09:57

## 2020-01-31 RX ADMIN — Medication 975 MILLIGRAM(S): at 22:18

## 2020-01-31 RX ADMIN — TETANUS TOXOID, REDUCED DIPHTHERIA TOXOID AND ACELLULAR PERTUSSIS VACCINE, ADSORBED 0.5 MILLILITER(S): 5; 2.5; 8; 8; 2.5 SUSPENSION INTRAMUSCULAR at 05:51

## 2020-01-31 RX ADMIN — Medication 30 MILLIGRAM(S): at 05:50

## 2020-01-31 RX ADMIN — Medication 30 MILLIGRAM(S): at 00:16

## 2020-01-31 RX ADMIN — INFLUENZA VIRUS VACCINE 0.5 MILLILITER(S): 15; 15; 15; 15 SUSPENSION INTRAMUSCULAR at 05:52

## 2020-01-31 RX ADMIN — Medication 30 MILLIGRAM(S): at 00:31

## 2020-01-31 RX ADMIN — Medication 600 MILLIGRAM(S): at 18:30

## 2020-02-01 VITALS
RESPIRATION RATE: 18 BRPM | HEART RATE: 72 BPM | DIASTOLIC BLOOD PRESSURE: 66 MMHG | SYSTOLIC BLOOD PRESSURE: 105 MMHG | TEMPERATURE: 98 F

## 2020-02-01 DIAGNOSIS — Z29.9 ENCOUNTER FOR PROPHYLACTIC MEASURES, UNSPECIFIED: ICD-10-CM

## 2020-02-01 PROCEDURE — 86901 BLOOD TYPING SEROLOGIC RH(D): CPT

## 2020-02-01 PROCEDURE — 90686 IIV4 VACC NO PRSV 0.5 ML IM: CPT

## 2020-02-01 PROCEDURE — 59050 FETAL MONITOR W/REPORT: CPT

## 2020-02-01 PROCEDURE — 59025 FETAL NON-STRESS TEST: CPT

## 2020-02-01 PROCEDURE — 81003 URINALYSIS AUTO W/O SCOPE: CPT

## 2020-02-01 PROCEDURE — 85027 COMPLETE CBC AUTOMATED: CPT

## 2020-02-01 PROCEDURE — 36415 COLL VENOUS BLD VENIPUNCTURE: CPT

## 2020-02-01 PROCEDURE — 90715 TDAP VACCINE 7 YRS/> IM: CPT

## 2020-02-01 PROCEDURE — 86780 TREPONEMA PALLIDUM: CPT

## 2020-02-01 PROCEDURE — 76815 OB US LIMITED FETUS(S): CPT

## 2020-02-01 PROCEDURE — 86850 RBC ANTIBODY SCREEN: CPT

## 2020-02-01 PROCEDURE — 86900 BLOOD TYPING SEROLOGIC ABO: CPT

## 2020-02-01 RX ORDER — IBUPROFEN 200 MG
1 TABLET ORAL
Qty: 28 | Refills: 0
Start: 2020-02-01 | End: 2020-02-07

## 2020-02-01 RX ADMIN — Medication 975 MILLIGRAM(S): at 10:50

## 2020-02-01 RX ADMIN — Medication 600 MILLIGRAM(S): at 01:01

## 2020-02-01 RX ADMIN — Medication 600 MILLIGRAM(S): at 00:31

## 2020-02-01 RX ADMIN — Medication 975 MILLIGRAM(S): at 09:50

## 2020-02-01 RX ADMIN — Medication 975 MILLIGRAM(S): at 04:00

## 2020-02-01 RX ADMIN — Medication 975 MILLIGRAM(S): at 04:30

## 2020-02-01 RX ADMIN — Medication 600 MILLIGRAM(S): at 13:18

## 2020-02-01 NOTE — PROGRESS NOTE ADULT - SUBJECTIVE AND OBJECTIVE BOX
GRANT DE OLIVEIRA is a 35 yo  now POD#3 s/p primary  section 2/ to nonreassuring fetal heart tracing at 40 weeks     S:    She is doing well, has no complaints. Pain controlled with medication   She is ambulating without difficulty and tolerating PO.   + flatus/-BM     O:    T(C): 36.4 (20 @ 21:13), Max: 36.4 (20 @ 08:34)  HR: 71 (20 @ 21:13) (71 - 86)  BP: 103/64 (20 @ 21:13) (98/61 - 103/64)  RR: 18 (20 @ 21:13) (18 - 18)  SpO2: 96% (20 @ 21:13) (96% - 96%)    Breast: engorged bilaterally   Abdomen:  soft, non-tender, non-distended, +bowel sounds.  Uterus:  Fundus firm below umbilicus  Incision:  Clean/dry/intact with staples   VE:  +lochia  Ext:  Non-tender, no edema
 Section, POD#1    Pt is now POD#1 S/P  Section due to NRFHT doing well.    She is ambulating without difficulty, tolerating a reg diet, and denies N/V.  No C/O dizziness, no ALEXSANDER.  +Voiding without difficulty.      On exam, pt appears well.  VSS, afebrile.    Vital Signs Last 24 Hrs  T(C): 36.4 (2020 04:45), Max: 37.1 (2020 10:10)  T(F): 97.5 (2020 04:45), Max: 98.7 (2020 10:10)  HR: 71 (2020 04:45) (63 - 111)  BP: 98/59 (2020 04:45) (97/52 - 117/63)  RR: 18 (2020 04:45) (13 - 25)  SpO2: 97% (2020 04:45) (90% - 100%)    Breasts soft, nontender, nonengorged.  Abdomen: Soft, nontender, nondistended , firm uterine fundus.  Incision clean, dry, intact with staples.  Pelvic: Normal lochia noted  Ext: No DVT tenderness, normal pulses, edema WNL for C/S POD#1.    LABS:                        9.5    17.31 )-----------( 309      ( 2020 07:10 )             29.2             Urinalysis Basic - ( 2020 17:46 )    Color: Yellow / Appearance: Clear / S.015 / pH: x  Gluc: x / Ketone: Negative  / Bili: Negative / Urobili: Negative mg/dL   Blood: x / Protein: Negative mg/dL / Nitrite: Negative   Leuk Esterase: Negative / RBC: x / WBC x   Sq Epi: x / Non Sq Epi: x / Bacteria: x            POD#1 S/P primary  stable.  Pt recovering well.  Signs and symptoms normal /  abnormal post op courses reviewed.
GRANT DE OLIVEIRA is a 33yo   now POD#1 s/p  section / to nonreassuring fetal heart tracing at 40 1/7 weeks gestation    S:    The patient has no complaints. Pain controlled with medication   She is ambulating without difficulty  She is tolerating PO liquids but has not yet attempted PO regular diet  - flatus/-BM/+ voiding     O:    T(C): 36.4 (20 @ 04:45), Max: 37.1 (20 @ 10:10)  HR: 71 (20 @ 04:45) (63 - 111)  BP: 98/59 (20 @ 04:45) (97/52 - 117/63)  RR: 18 (20 @ 04:45) (13 - 25)  SpO2: 97% (20 @ 04:45) (89% - 100%)      Gen: NAD, AOx3  CV: RRR  Pulm: CTAB  Breast: nontender, not engorged   Abdomen:  soft, non-tender, non-distended, +bowel sounds.  Uterus:  Fundus firm below umbilicus  Incision:  Clean/dry/intact with staples in place  VE:  +lochia  Ext:  Non-tender, no edema                           11.3   10.18 )-----------( 379      ( 2020 17:46 )             35.3
GRANT DE OLIVEIRA is a 33yo   now POD#2 s/p  section  to nonreassuring fetal heart tracing at 40 1/7 weeks gestation    S:    The patient has no complaints. Pain controlled with medication   She is ambulating without difficulty and tolerating PO.   + flatus/-BM/+ voiding     O:    T(C): 36.7 (20 @ 20:38), Max: 36.7 (20 @ 20:38)  HR: 80 (20 @ 20:38) (79 - 80)  BP: 95/57 (20 @ 20:38) (95/57 - 98/49)  RR: 18 (20 @ 20:38) (18 - 18)  SpO2: 98% (20 @ 20:38) (98% - 98%)      Gen: NAD, AOx3  CV: RRR  Pulm: CTAB  Breast: nontender, not engorged   Abdomen:  soft, non-tender, non-distended, +bowel sounds.  Uterus:  Fundus firm below umbilicus  Incision:  Clean/dry/intact with staples in place  VE:  +lochia  Ext:  Non-tender, no edema                           9.5    17.31 )-----------( 309      ( 2020 07:10 )             29.2

## 2020-02-01 NOTE — PROGRESS NOTE ADULT - PROBLEM SELECTOR PLAN 1
-Recovering well   -Voiding, tolerating PO, bowel function nml   -Advance care as tolerated   -Breast are engorged bilaterally, still expressing milk. Nontender. No concern for mastitis at this time. Will be seen today by breast consultant.   -Continue routine postpartum/postoperative care and education.

## 2020-02-01 NOTE — PROGRESS NOTE ADULT - ASSESSMENT
A/P:  GRANT DE OLIVEIRA is a 33 yo  now POD#3 s/p primary  section 2/2 to nonreassuring fetal heart tracing
A/P:  33yo   now POD#1 s/p  section 2/2 to nonreassuring fetal heart tracing at 40 1/7 weeks gestation  -Vital Signs Stable  -Voiding  -Will attempt regular diet this AM  -Bowel sounds normal, flatus pending  -Advance care as tolerated   -Continue routine postpartum/postoperative care and education.  -Healthy male infant, desires circumcision.
A/P:  33yo   now POD#2 s/p  section 2/2 to nonreassuring fetal heart tracing at 40 1/7 weeks gestation  -Vital Signs Stable  -Voiding, tolerating PO, bowel function nml   -Advance care as tolerated   -Continue routine postpartum/postoperative care and education.  -Healthy male infant, s/p circumcision.

## 2020-02-01 NOTE — PROGRESS NOTE ADULT - ATTENDING COMMENTS
AS above, pt doing well - recovering well.  Plan for D/C home tomorrow am pending exam.
POD#3 S/P primary C/S etta.  OK for D/C home

## 2020-03-20 ENCOUNTER — RESULT REVIEW (OUTPATIENT)
Age: 35
End: 2020-03-20

## 2020-04-18 ENCOUNTER — TRANSCRIPTION ENCOUNTER (OUTPATIENT)
Age: 35
End: 2020-04-18

## 2020-11-21 ENCOUNTER — RESULT REVIEW (OUTPATIENT)
Age: 35
End: 2020-11-21

## 2021-04-12 ENCOUNTER — TRANSCRIPTION ENCOUNTER (OUTPATIENT)
Age: 36
End: 2021-04-12

## 2021-05-12 ENCOUNTER — OUTPATIENT (OUTPATIENT)
Dept: OUTPATIENT SERVICES | Facility: HOSPITAL | Age: 36
LOS: 1 days | End: 2021-05-12

## 2021-05-12 VITALS
HEART RATE: 82 BPM | TEMPERATURE: 98 F | SYSTOLIC BLOOD PRESSURE: 120 MMHG | HEIGHT: 64 IN | RESPIRATION RATE: 20 BRPM | DIASTOLIC BLOOD PRESSURE: 59 MMHG

## 2021-05-12 DIAGNOSIS — Z98.890 OTHER SPECIFIED POSTPROCEDURAL STATES: Chronic | ICD-10-CM

## 2021-05-12 DIAGNOSIS — Z98.891 HISTORY OF UTERINE SCAR FROM PREVIOUS SURGERY: Chronic | ICD-10-CM

## 2021-05-12 DIAGNOSIS — Z01.818 ENCOUNTER FOR OTHER PREPROCEDURAL EXAMINATION: ICD-10-CM

## 2021-05-12 LAB
ALBUMIN SERPL ELPH-MCNC: 3.5 G/DL — SIGNIFICANT CHANGE UP (ref 3.3–5.2)
ALP SERPL-CCNC: 175 U/L — HIGH (ref 40–120)
ALT FLD-CCNC: 12 U/L — SIGNIFICANT CHANGE UP
ANION GAP SERPL CALC-SCNC: 10 MMOL/L — SIGNIFICANT CHANGE UP (ref 5–17)
APTT BLD: 30.4 SEC — SIGNIFICANT CHANGE UP (ref 27.5–35.5)
AST SERPL-CCNC: 20 U/L — SIGNIFICANT CHANGE UP
BASOPHILS # BLD AUTO: 0.05 K/UL — SIGNIFICANT CHANGE UP (ref 0–0.2)
BASOPHILS NFR BLD AUTO: 0.5 % — SIGNIFICANT CHANGE UP (ref 0–2)
BILIRUB SERPL-MCNC: 0.2 MG/DL — LOW (ref 0.4–2)
BLD GP AB SCN SERPL QL: SIGNIFICANT CHANGE UP
BUN SERPL-MCNC: 6 MG/DL — LOW (ref 8–20)
CALCIUM SERPL-MCNC: 9.4 MG/DL — SIGNIFICANT CHANGE UP (ref 8.6–10.2)
CHLORIDE SERPL-SCNC: 104 MMOL/L — SIGNIFICANT CHANGE UP (ref 98–107)
CO2 SERPL-SCNC: 24 MMOL/L — SIGNIFICANT CHANGE UP (ref 22–29)
CREAT SERPL-MCNC: 0.51 MG/DL — SIGNIFICANT CHANGE UP (ref 0.5–1.3)
EOSINOPHIL # BLD AUTO: 0.15 K/UL — SIGNIFICANT CHANGE UP (ref 0–0.5)
EOSINOPHIL NFR BLD AUTO: 1.6 % — SIGNIFICANT CHANGE UP (ref 0–6)
GLUCOSE SERPL-MCNC: 68 MG/DL — LOW (ref 70–99)
HCT VFR BLD CALC: 32.3 % — LOW (ref 34.5–45)
HGB BLD-MCNC: 10.8 G/DL — LOW (ref 11.5–15.5)
IMM GRANULOCYTES NFR BLD AUTO: 0.4 % — SIGNIFICANT CHANGE UP (ref 0–1.5)
INR BLD: 0.97 RATIO — SIGNIFICANT CHANGE UP (ref 0.88–1.16)
LYMPHOCYTES # BLD AUTO: 1.47 K/UL — SIGNIFICANT CHANGE UP (ref 1–3.3)
LYMPHOCYTES # BLD AUTO: 15.7 % — SIGNIFICANT CHANGE UP (ref 13–44)
MCHC RBC-ENTMCNC: 29.9 PG — SIGNIFICANT CHANGE UP (ref 27–34)
MCHC RBC-ENTMCNC: 33.4 GM/DL — SIGNIFICANT CHANGE UP (ref 32–36)
MCV RBC AUTO: 89.5 FL — SIGNIFICANT CHANGE UP (ref 80–100)
MONOCYTES # BLD AUTO: 1.35 K/UL — HIGH (ref 0–0.9)
MONOCYTES NFR BLD AUTO: 14.4 % — HIGH (ref 2–14)
NEUTROPHILS # BLD AUTO: 6.32 K/UL — SIGNIFICANT CHANGE UP (ref 1.8–7.4)
NEUTROPHILS NFR BLD AUTO: 67.4 % — SIGNIFICANT CHANGE UP (ref 43–77)
PLATELET # BLD AUTO: 345 K/UL — SIGNIFICANT CHANGE UP (ref 150–400)
POTASSIUM SERPL-MCNC: 4.2 MMOL/L — SIGNIFICANT CHANGE UP (ref 3.5–5.3)
POTASSIUM SERPL-SCNC: 4.2 MMOL/L — SIGNIFICANT CHANGE UP (ref 3.5–5.3)
PROT SERPL-MCNC: 6.7 G/DL — SIGNIFICANT CHANGE UP (ref 6.6–8.7)
PROTHROM AB SERPL-ACNC: 11.3 SEC — SIGNIFICANT CHANGE UP (ref 10.6–13.6)
RBC # BLD: 3.61 M/UL — LOW (ref 3.8–5.2)
RBC # FLD: 13.6 % — SIGNIFICANT CHANGE UP (ref 10.3–14.5)
SODIUM SERPL-SCNC: 138 MMOL/L — SIGNIFICANT CHANGE UP (ref 135–145)
WBC # BLD: 9.38 K/UL — SIGNIFICANT CHANGE UP (ref 3.8–10.5)
WBC # FLD AUTO: 9.38 K/UL — SIGNIFICANT CHANGE UP (ref 3.8–10.5)

## 2021-05-12 NOTE — OB PST NOTE - BLOOD TRANSFUSION, PREVIOUS, PROFILE
Gallbladder out in Nov, then had stent placement due to complications. Stent was removed last week. States that he has the pain that he had after surgery but prior to stent placement. no

## 2021-05-24 ENCOUNTER — OUTPATIENT (OUTPATIENT)
Dept: OUTPATIENT SERVICES | Facility: HOSPITAL | Age: 36
LOS: 1 days | End: 2021-05-24
Payer: COMMERCIAL

## 2021-05-24 DIAGNOSIS — Z98.890 OTHER SPECIFIED POSTPROCEDURAL STATES: Chronic | ICD-10-CM

## 2021-05-24 DIAGNOSIS — Z98.891 HISTORY OF UTERINE SCAR FROM PREVIOUS SURGERY: Chronic | ICD-10-CM

## 2021-05-24 DIAGNOSIS — Z20.818 CONTACT WITH AND (SUSPECTED) EXPOSURE TO OTHER BACTERIAL COMMUNICABLE DISEASES: ICD-10-CM

## 2021-05-24 LAB
SARS-COV-2 RNA SPEC QL NAA+PROBE: DETECTED

## 2021-05-24 PROCEDURE — U0005: CPT

## 2021-05-24 PROCEDURE — U0003: CPT

## 2021-05-25 ENCOUNTER — TRANSCRIPTION ENCOUNTER (OUTPATIENT)
Age: 36
End: 2021-05-25

## 2021-05-25 RX ORDER — FAMOTIDINE 10 MG/ML
20 INJECTION INTRAVENOUS ONCE
Refills: 0 | Status: COMPLETED | OUTPATIENT
Start: 2021-05-26 | End: 2021-05-26

## 2021-05-25 RX ORDER — SODIUM CHLORIDE 9 MG/ML
1000 INJECTION, SOLUTION INTRAVENOUS ONCE
Refills: 0 | Status: COMPLETED | OUTPATIENT
Start: 2021-05-26 | End: 2021-05-26

## 2021-05-25 RX ORDER — CEFAZOLIN SODIUM 1 G
2000 VIAL (EA) INJECTION ONCE
Refills: 0 | Status: DISCONTINUED | OUTPATIENT
Start: 2021-05-26 | End: 2021-05-26

## 2021-05-25 RX ORDER — SODIUM CHLORIDE 9 MG/ML
1000 INJECTION, SOLUTION INTRAVENOUS
Refills: 0 | Status: DISCONTINUED | OUTPATIENT
Start: 2021-05-26 | End: 2021-05-26

## 2021-05-25 RX ORDER — OXYTOCIN 10 UNIT/ML
333.33 VIAL (ML) INJECTION
Qty: 20 | Refills: 0 | Status: DISCONTINUED | OUTPATIENT
Start: 2021-05-26 | End: 2021-05-28

## 2021-05-25 RX ORDER — CITRIC ACID/SODIUM CITRATE 300-500 MG
30 SOLUTION, ORAL ORAL ONCE
Refills: 0 | Status: COMPLETED | OUTPATIENT
Start: 2021-05-26 | End: 2021-05-26

## 2021-05-25 NOTE — OB PROVIDER H&P - ATTENDING COMMENTS
As above, 35 yr old  at 39wks gestation admitted for scheduled rpt C/S, not in labor.  +Covid-19 testing (had Covid in April).  No symptoms of Covid at this time.  Plan to proceed with rpt C/S, R/B/A reviewed.

## 2021-05-25 NOTE — OB PROVIDER H&P - ASSESSMENT
Patient is a 35 year old [id number] 39wks who presents to L&D for repeat  section.   - Admit to L&D  - Consent  - Admission Labs  - IV fluids   - Continuous toco and fetal monitoring   - DVT prophylaxis  - pre-op abx

## 2021-05-25 NOTE — OB PROVIDER H&P - HISTORY OF PRESENT ILLNESS
Patient is a 35 year old [id number] 39wks who presents to L&D for repeat  section.     TIM: 2021   LMP: 2020     Pregnancy course:   COVID positive 21 and 21   hypothyroidism   GBS+    Obhx:   G1- 2019: SAB at 8wks w/ D&C   G2- 2020: pCS at 40wks; [id number]     Pmhx: anemia, hypothyroidism   Pshx: CS, D&C, arthroscopy of right knee   Meds: PNV   Allergies: NKDA

## 2021-05-26 ENCOUNTER — INPATIENT (INPATIENT)
Facility: HOSPITAL | Age: 36
LOS: 1 days | Discharge: HOME | End: 2021-05-28
Payer: COMMERCIAL

## 2021-05-26 ENCOUNTER — TRANSCRIPTION ENCOUNTER (OUTPATIENT)
Age: 36
End: 2021-05-26

## 2021-05-26 VITALS
DIASTOLIC BLOOD PRESSURE: 60 MMHG | HEART RATE: 76 BPM | TEMPERATURE: 99 F | HEIGHT: 64 IN | SYSTOLIC BLOOD PRESSURE: 113 MMHG | WEIGHT: 158.07 LBS | RESPIRATION RATE: 18 BRPM

## 2021-05-26 DIAGNOSIS — Z98.891 HISTORY OF UTERINE SCAR FROM PREVIOUS SURGERY: Chronic | ICD-10-CM

## 2021-05-26 DIAGNOSIS — Z98.890 OTHER SPECIFIED POSTPROCEDURAL STATES: Chronic | ICD-10-CM

## 2021-05-26 DIAGNOSIS — O34.219 MATERNAL CARE FOR UNSPECIFIED TYPE SCAR FROM PREVIOUS CESAREAN DELIVERY: ICD-10-CM

## 2021-05-26 LAB
BASOPHILS # BLD AUTO: 0.05 K/UL — SIGNIFICANT CHANGE UP (ref 0–0.2)
BASOPHILS NFR BLD AUTO: 0.6 % — SIGNIFICANT CHANGE UP (ref 0–2)
BLD GP AB SCN SERPL QL: SIGNIFICANT CHANGE UP
COVID-19 SPIKE DOMAIN AB INTERP: POSITIVE
COVID-19 SPIKE DOMAIN ANTIBODY RESULT: 181 U/ML — HIGH
EOSINOPHIL # BLD AUTO: 0.12 K/UL — SIGNIFICANT CHANGE UP (ref 0–0.5)
EOSINOPHIL NFR BLD AUTO: 1.4 % — SIGNIFICANT CHANGE UP (ref 0–6)
HCT VFR BLD CALC: 33.4 % — LOW (ref 34.5–45)
HGB BLD-MCNC: 10.8 G/DL — LOW (ref 11.5–15.5)
IMM GRANULOCYTES NFR BLD AUTO: 0.2 % — SIGNIFICANT CHANGE UP (ref 0–1.5)
LYMPHOCYTES # BLD AUTO: 1.43 K/UL — SIGNIFICANT CHANGE UP (ref 1–3.3)
LYMPHOCYTES # BLD AUTO: 16.6 % — SIGNIFICANT CHANGE UP (ref 13–44)
MCHC RBC-ENTMCNC: 29.5 PG — SIGNIFICANT CHANGE UP (ref 27–34)
MCHC RBC-ENTMCNC: 32.3 GM/DL — SIGNIFICANT CHANGE UP (ref 32–36)
MCV RBC AUTO: 91.3 FL — SIGNIFICANT CHANGE UP (ref 80–100)
MONOCYTES # BLD AUTO: 1.03 K/UL — HIGH (ref 0–0.9)
MONOCYTES NFR BLD AUTO: 11.9 % — SIGNIFICANT CHANGE UP (ref 2–14)
NEUTROPHILS # BLD AUTO: 5.97 K/UL — SIGNIFICANT CHANGE UP (ref 1.8–7.4)
NEUTROPHILS NFR BLD AUTO: 69.3 % — SIGNIFICANT CHANGE UP (ref 43–77)
PLATELET # BLD AUTO: 324 K/UL — SIGNIFICANT CHANGE UP (ref 150–400)
RBC # BLD: 3.66 M/UL — LOW (ref 3.8–5.2)
RBC # FLD: 13.9 % — SIGNIFICANT CHANGE UP (ref 10.3–14.5)
SARS-COV-2 IGG+IGM SERPL QL IA: 181 U/ML — HIGH
SARS-COV-2 IGG+IGM SERPL QL IA: POSITIVE
T PALLIDUM AB TITR SER: NEGATIVE — SIGNIFICANT CHANGE UP
WBC # BLD: 8.62 K/UL — SIGNIFICANT CHANGE UP (ref 3.8–10.5)
WBC # FLD AUTO: 8.62 K/UL — SIGNIFICANT CHANGE UP (ref 3.8–10.5)

## 2021-05-26 RX ORDER — ACETAMINOPHEN 500 MG
975 TABLET ORAL
Refills: 0 | Status: DISCONTINUED | OUTPATIENT
Start: 2021-05-26 | End: 2021-05-28

## 2021-05-26 RX ORDER — IBUPROFEN 200 MG
600 TABLET ORAL EVERY 6 HOURS
Refills: 0 | Status: COMPLETED | OUTPATIENT
Start: 2021-05-26 | End: 2022-04-24

## 2021-05-26 RX ORDER — KETOROLAC TROMETHAMINE 30 MG/ML
30 SYRINGE (ML) INJECTION EVERY 6 HOURS
Refills: 0 | Status: DISCONTINUED | OUTPATIENT
Start: 2021-05-26 | End: 2021-05-27

## 2021-05-26 RX ORDER — SIMETHICONE 80 MG/1
80 TABLET, CHEWABLE ORAL EVERY 4 HOURS
Refills: 0 | Status: DISCONTINUED | OUTPATIENT
Start: 2021-05-26 | End: 2021-05-28

## 2021-05-26 RX ORDER — LANOLIN
1 OINTMENT (GRAM) TOPICAL EVERY 6 HOURS
Refills: 0 | Status: DISCONTINUED | OUTPATIENT
Start: 2021-05-26 | End: 2021-05-28

## 2021-05-26 RX ORDER — OXYTOCIN 10 UNIT/ML
333.33 VIAL (ML) INJECTION
Qty: 20 | Refills: 0 | Status: DISCONTINUED | OUTPATIENT
Start: 2021-05-26 | End: 2021-05-28

## 2021-05-26 RX ORDER — DIPHENHYDRAMINE HCL 50 MG
25 CAPSULE ORAL EVERY 6 HOURS
Refills: 0 | Status: DISCONTINUED | OUTPATIENT
Start: 2021-05-26 | End: 2021-05-28

## 2021-05-26 RX ORDER — SODIUM CHLORIDE 9 MG/ML
1000 INJECTION, SOLUTION INTRAVENOUS
Refills: 0 | Status: DISCONTINUED | OUTPATIENT
Start: 2021-05-26 | End: 2021-05-28

## 2021-05-26 RX ORDER — MAGNESIUM HYDROXIDE 400 MG/1
30 TABLET, CHEWABLE ORAL
Refills: 0 | Status: DISCONTINUED | OUTPATIENT
Start: 2021-05-26 | End: 2021-05-28

## 2021-05-26 RX ORDER — CEFAZOLIN SODIUM 1 G
2000 VIAL (EA) INJECTION EVERY 8 HOURS
Refills: 0 | Status: COMPLETED | OUTPATIENT
Start: 2021-05-26 | End: 2021-05-27

## 2021-05-26 RX ORDER — TETANUS TOXOID, REDUCED DIPHTHERIA TOXOID AND ACELLULAR PERTUSSIS VACCINE, ADSORBED 5; 2.5; 8; 8; 2.5 [IU]/.5ML; [IU]/.5ML; UG/.5ML; UG/.5ML; UG/.5ML
0.5 SUSPENSION INTRAMUSCULAR ONCE
Refills: 0 | Status: DISCONTINUED | OUTPATIENT
Start: 2021-05-26 | End: 2021-05-28

## 2021-05-26 RX ORDER — OXYCODONE HYDROCHLORIDE 5 MG/1
5 TABLET ORAL
Refills: 0 | Status: DISCONTINUED | OUTPATIENT
Start: 2021-05-26 | End: 2021-05-28

## 2021-05-26 RX ORDER — OXYCODONE HYDROCHLORIDE 5 MG/1
5 TABLET ORAL ONCE
Refills: 0 | Status: DISCONTINUED | OUTPATIENT
Start: 2021-05-26 | End: 2021-05-28

## 2021-05-26 RX ADMIN — Medication 100 MILLIGRAM(S): at 08:20

## 2021-05-26 RX ADMIN — SODIUM CHLORIDE 125 MILLILITER(S): 9 INJECTION, SOLUTION INTRAVENOUS at 15:09

## 2021-05-26 RX ADMIN — Medication 30 MILLILITER(S): at 06:36

## 2021-05-26 RX ADMIN — SODIUM CHLORIDE 2000 MILLILITER(S): 9 INJECTION, SOLUTION INTRAVENOUS at 06:37

## 2021-05-26 RX ADMIN — Medication 30 MILLIGRAM(S): at 18:58

## 2021-05-26 RX ADMIN — Medication 1000 MILLIUNIT(S)/MIN: at 09:10

## 2021-05-26 RX ADMIN — Medication 975 MILLIGRAM(S): at 22:00

## 2021-05-26 RX ADMIN — FAMOTIDINE 20 MILLIGRAM(S): 10 INJECTION INTRAVENOUS at 06:36

## 2021-05-26 RX ADMIN — Medication 975 MILLIGRAM(S): at 21:23

## 2021-05-26 RX ADMIN — Medication 975 MILLIGRAM(S): at 15:08

## 2021-05-26 RX ADMIN — Medication 100 MILLIGRAM(S): at 16:27

## 2021-05-26 RX ADMIN — Medication 30 MILLIGRAM(S): at 18:42

## 2021-05-26 RX ADMIN — SODIUM CHLORIDE 125 MILLILITER(S): 9 INJECTION, SOLUTION INTRAVENOUS at 07:05

## 2021-05-26 RX ADMIN — Medication 975 MILLIGRAM(S): at 15:45

## 2021-05-26 NOTE — OB NEONATOLOGY/PEDIATRICIAN DELIVERY SUMMARY - NSPEDSNEONOTESA_OBGYN_ALL_OB_FT
Called by Dr. Cui assess this infant after C/S delivery due to unplanned breech extraction. This is a 39 week infant born via scheduled RCS to a 36yo  mother with prenatal labs: Blood Type  A+, HIV NR, RPR NR, Hep B NR, RI, GBS positive but no ROM/labor prior to delivery.  Maternal history is significant for hypothyroidism. Pregnancy was complicated by maternal Covid infection in April.  AROM at  delivery.  I was called after delivery due to breech extraction and arrived just after 1 minute of life. Resuscitation included: routine w/d/s only.  Apgars were: 9/10. Admit to [facility]BN

## 2021-05-26 NOTE — DISCHARGE NOTE OB - PATIENT PORTAL LINK FT
You can access the FollowMyHealth Patient Portal offered by [facility] by registering at the following website: [email or web address]. By joining [facility]’s FollowMyHealth portal, you will also be able to view your health information using other applications (apps) compatible with our system.

## 2021-05-26 NOTE — DISCHARGE NOTE OB - CARE PLAN
Principal Discharge DX:	 delivery delivered  Goal:	Rapid recovery  Assessment and plan of treatment:	Please call your provider to schedule postoperative staple removal appointment in 2-4 days. Take medications as directed, regular diet, activity as tolerated. Exclusive breast feeding for the first 6 months is recommended. Nothing per vagina for 6 weeks (incl. sex, douching, etc). If you have additional concerns, please inform your provider.

## 2021-05-26 NOTE — OB PROVIDER DELIVERY SUMMARY - DELIVERY COMPLICATIONS; NUCHAL CORD
nuchal cord x 1 tight and cord around body tight rotating the fetus to transverse lie with fundal pressure, baby rotated and delivered by footline breech

## 2021-05-26 NOTE — OB RN PATIENT PROFILE - VDRL/RPR: DATE, OB PROFILE
Pt treated with pain medication and muscle relaxants and she feels a lot better. Chest X-ray normal on ED read. Pt will be notified if any additional reading by radiology is present. Pt D/C with Rx sent to pharmacy. 24-Nov-2020

## 2021-05-26 NOTE — DISCHARGE NOTE OB - PLAN OF CARE
Please call your provider to schedule postoperative staple removal appointment in 2-4 days. Take medications as directed, regular diet, activity as tolerated. Exclusive breast feeding for the first 6 months is recommended. Nothing per vagina for 6 weeks (incl. sex, douching, etc). If you have additional concerns, please inform your provider. Rapid recovery

## 2021-05-26 NOTE — OB NEONATOLOGY/PEDIATRICIAN DELIVERY SUMMARY - NSHXCHECK_OBGYN_ALL_OB
Yes
77 yo F, nonsmoker with PMH of Alzheimer's & Parkinson's disease presents to ER c/o right wrist pain and swelling x1 month, no injury. well appearing F with dementia, alert, awake, not oriented x3, verbal, able to follow some commands p/w right wrist pain w/ swelling and blister, pt is afebrile in ED, no hx of injury/trauma. Likely fracture blister. Plan: obtain labs, VBG, Xray wrist & hand, pain control, and reassess.

## 2021-05-26 NOTE — DISCHARGE NOTE OB - CARE PROVIDER_API CALL
Clementine Cui)  Obstetrics and Gynecology  [address] [place] [address]  Phone: [phone]  Fax: [phone]  Established Patient  Follow Up Time: 1 week

## 2021-05-26 NOTE — DISCHARGE NOTE OB - MATERIALS PROVIDED
Vaccinations/[facility]  Screening Program/  Immunization Record/Breastfeeding Log/Breastfeeding Mother’s Support Group Information/[facility] Hearing Screen Program/Back To Sleep Handout/Breastfeeding Guide and Packet/Birth Certificate Instructions/Discharge Medication Information for Patients and Families Pocket Guide/Tdap Vaccination (VIS Pub Date: 2012)

## 2021-05-26 NOTE — OB PROVIDER DELIVERY SUMMARY - NSSELHIDDEN_OBGYN_ALL_OB_FT
[NS_DeliveryAttending1_OBGYN_ALL_OB_FT:[id number]],[NS_DeliveryAttending2_OBGYN_ALL_OB_FT:[id number]],[NS_DeliveryRN_OBGYN_ALL_OB_FT:[id number]]

## 2021-05-26 NOTE — OB RN DELIVERY SUMMARY - NS_SEPSISRSKCALC_OBGYN_ALL_OB_FT
EOS calculated successfully. EOS Risk Factor: 0.5/1000 live births ([facility] national incidence); GA=39w;Temp=98.8; ROM=0.05; GBS='Positive'; Antibiotics='No antibiotics or any antibiotics < 2 hrs prior to birth'

## 2021-05-26 NOTE — OB RN PATIENT PROFILE - AS SC BRADEN MOBILITY
(4) no limitation Cyclosporine Counseling:  I discussed with the patient the risks of cyclosporine including but not limited to hypertension, gingival hyperplasia,myelosuppression, immunosuppression, liver damage, kidney damage, neurotoxicity, lymphoma, and serious infections. The patient understands that monitoring is required including baseline blood pressure, CBC, CMP, lipid panel and uric acid, and then 1-2 times monthly CMP and blood pressure.

## 2021-05-26 NOTE — OB PROVIDER DELIVERY SUMMARY - NSPROVIDERDELIVERYNOTE_OBGYN_ALL_OB_FT
Pt taken to the OR for rpt C/S at 39wks gestation, not in labor.  Rpt C/S performed by Dr HARIKA Cui, assist Dr MICHELE Zapata under spinal anesthesia.  Fetus VTX with hysterotomy but with any fundal pressure, rotating fetus to transverse lie.  Decision made for version and fetus delivered by footling breech.  Tight nuchal cord x 1 and tight cord around the body x 1.  :  male, APGAR 9/9, weight 2920g, 6lb 6oz.  Amniotic fluid clear.  Uterus, tubes, ovaries WNL.  Hypertrophic scar removed with incision.  No complications.  skin-to-skin initiated in OR, then continued into RR.  Baby later admitted to WBN, mother stable.  No complications.

## 2021-05-26 NOTE — OB RN INTRAOPERATIVE NOTE - NS_ADDITIONALPROCINFO1_OBGYN_ALL_OB_FT
covid status of mom is positive, tested positive in april 2021 and remains positive, asymptomatic, fob covid negative. covid status of both pt and sig other noted during surgical time out.

## 2021-05-26 NOTE — OB RN DELIVERY SUMMARY - NSSELHIDDEN_OBGYN_ALL_OB_FT
[NS_DeliveryAttending1_OBGYN_ALL_OB_FT:[id number]],[NS_DeliveryAttending2_OBGYN_ALL_OB_FT:[id number]],[NS_DeliveryRN_OBGYN_ALL_OB_FT:[id number]]

## 2021-05-26 NOTE — OB RN INTRAOPERATIVE NOTE - NSSELHIDDEN_OBGYN_ALL_OB_FT
[NS_DeliveryAttending1_OBGYN_ALL_OB_FT:[id number]],[NS_DeliveryAttending2_OBGYN_ALL_OB_FT:[id number]],[NS_DeliveryRN_OBGYN_ALL_OB_FT:[id number]=]

## 2021-05-27 LAB
BASOPHILS # BLD AUTO: 0.04 K/UL — SIGNIFICANT CHANGE UP (ref 0–0.2)
BASOPHILS NFR BLD AUTO: 0.4 % — SIGNIFICANT CHANGE UP (ref 0–2)
COVID-19 SPIKE DOMAIN AB INTERP: POSITIVE
COVID-19 SPIKE DOMAIN ANTIBODY RESULT: 151 U/ML — HIGH
EOSINOPHIL # BLD AUTO: 0.18 K/UL — SIGNIFICANT CHANGE UP (ref 0–0.5)
EOSINOPHIL NFR BLD AUTO: 1.6 % — SIGNIFICANT CHANGE UP (ref 0–6)
HCT VFR BLD CALC: 28.6 % — LOW (ref 34.5–45)
HGB BLD-MCNC: 9.4 G/DL — LOW (ref 11.5–15.5)
IMM GRANULOCYTES NFR BLD AUTO: 0.4 % — SIGNIFICANT CHANGE UP (ref 0–1.5)
LYMPHOCYTES # BLD AUTO: 1.7 K/UL — SIGNIFICANT CHANGE UP (ref 1–3.3)
LYMPHOCYTES # BLD AUTO: 15.2 % — SIGNIFICANT CHANGE UP (ref 13–44)
MCHC RBC-ENTMCNC: 30 PG — SIGNIFICANT CHANGE UP (ref 27–34)
MCHC RBC-ENTMCNC: 32.9 GM/DL — SIGNIFICANT CHANGE UP (ref 32–36)
MCV RBC AUTO: 91.4 FL — SIGNIFICANT CHANGE UP (ref 80–100)
MONOCYTES # BLD AUTO: 1.15 K/UL — HIGH (ref 0–0.9)
MONOCYTES NFR BLD AUTO: 10.3 % — SIGNIFICANT CHANGE UP (ref 2–14)
NEUTROPHILS # BLD AUTO: 8.07 K/UL — HIGH (ref 1.8–7.4)
NEUTROPHILS NFR BLD AUTO: 72.1 % — SIGNIFICANT CHANGE UP (ref 43–77)
PLATELET # BLD AUTO: 287 K/UL — SIGNIFICANT CHANGE UP (ref 150–400)
RBC # BLD: 3.13 M/UL — LOW (ref 3.8–5.2)
RBC # FLD: 14.3 % — SIGNIFICANT CHANGE UP (ref 10.3–14.5)
SARS-COV-2 IGG+IGM SERPL QL IA: 151 U/ML — HIGH
SARS-COV-2 IGG+IGM SERPL QL IA: POSITIVE
WBC # BLD: 11.18 K/UL — HIGH (ref 3.8–10.5)
WBC # FLD AUTO: 11.18 K/UL — HIGH (ref 3.8–10.5)

## 2021-05-27 RX ORDER — IBUPROFEN 200 MG
1 TABLET ORAL
Qty: 20 | Refills: 0
Start: 2021-05-27 | End: 2021-05-31

## 2021-05-27 RX ORDER — IBUPROFEN 200 MG
600 TABLET ORAL EVERY 6 HOURS
Refills: 0 | Status: DISCONTINUED | OUTPATIENT
Start: 2021-05-27 | End: 2021-05-28

## 2021-05-27 RX ORDER — ACETAMINOPHEN 500 MG
3 TABLET ORAL
Qty: 60 | Refills: 0
Start: 2021-05-27 | End: 2021-05-31

## 2021-05-27 RX ADMIN — Medication 100 MILLIGRAM(S): at 00:02

## 2021-05-27 RX ADMIN — Medication 975 MILLIGRAM(S): at 20:51

## 2021-05-27 RX ADMIN — Medication 975 MILLIGRAM(S): at 16:38

## 2021-05-27 RX ADMIN — Medication 30 MILLIGRAM(S): at 00:02

## 2021-05-27 RX ADMIN — Medication 975 MILLIGRAM(S): at 02:58

## 2021-05-27 RX ADMIN — Medication 600 MILLIGRAM(S): at 18:14

## 2021-05-27 RX ADMIN — Medication 975 MILLIGRAM(S): at 03:40

## 2021-05-27 RX ADMIN — Medication 600 MILLIGRAM(S): at 19:13

## 2021-05-27 RX ADMIN — Medication 30 MILLIGRAM(S): at 05:29

## 2021-05-27 RX ADMIN — Medication 30 MILLIGRAM(S): at 05:44

## 2021-05-27 RX ADMIN — Medication 30 MILLIGRAM(S): at 00:17

## 2021-05-27 RX ADMIN — Medication 975 MILLIGRAM(S): at 21:45

## 2021-05-27 RX ADMIN — Medication 975 MILLIGRAM(S): at 15:38

## 2021-05-27 NOTE — PROGRESS NOTE ADULT - ASSESSMENT
A/P: Patient is a 35y  s/p rCS now POD1  - Stable, doing well postpartum  - Hgb 10.8 > 10.8  - Pain: well controlled on PO pain meds  - GI: regular diet  - : voiding  - DVT ppx: ambulate  - Continue routine postop care  - D/c tomorrow pending circ  - Dispo: Continue inpatient care A/P: Patient is a 35y  s/p rCS now POD1  - Stable, doing well postpartum  - Hgb 10.8 > 10.8  - Pain: well controlled on PO pain meds  - GI: regular diet  - : voiding  - DVT ppx: ambulate  - Continue routine postop care  - D/c tomorrow pending circ  - Dispo: Continue inpatient care    I have read and agree with everything in the above note.     Radha Doty- PGY4

## 2021-05-28 VITALS
SYSTOLIC BLOOD PRESSURE: 110 MMHG | RESPIRATION RATE: 20 BRPM | OXYGEN SATURATION: 98 % | HEART RATE: 74 BPM | TEMPERATURE: 98 F | DIASTOLIC BLOOD PRESSURE: 63 MMHG

## 2021-05-28 PROCEDURE — 36415 COLL VENOUS BLD VENIPUNCTURE: CPT

## 2021-05-28 PROCEDURE — G0463: CPT

## 2021-05-28 PROCEDURE — 86900 BLOOD TYPING SEROLOGIC ABO: CPT

## 2021-05-28 PROCEDURE — 86780 TREPONEMA PALLIDUM: CPT

## 2021-05-28 PROCEDURE — 86850 RBC ANTIBODY SCREEN: CPT

## 2021-05-28 PROCEDURE — 59050 FETAL MONITOR W/REPORT: CPT

## 2021-05-28 PROCEDURE — 86901 BLOOD TYPING SEROLOGIC RH(D): CPT

## 2021-05-28 PROCEDURE — 86769 SARS-COV-2 COVID-19 ANTIBODY: CPT

## 2021-05-28 PROCEDURE — 59025 FETAL NON-STRESS TEST: CPT

## 2021-05-28 PROCEDURE — 85025 COMPLETE CBC W/AUTO DIFF WBC: CPT

## 2021-05-28 RX ADMIN — Medication 600 MILLIGRAM(S): at 00:02

## 2021-05-28 RX ADMIN — Medication 600 MILLIGRAM(S): at 06:29

## 2021-05-28 RX ADMIN — Medication 600 MILLIGRAM(S): at 00:40

## 2021-05-28 RX ADMIN — Medication 975 MILLIGRAM(S): at 03:26

## 2021-05-28 RX ADMIN — Medication 600 MILLIGRAM(S): at 05:58

## 2021-05-28 RX ADMIN — Medication 975 MILLIGRAM(S): at 04:00

## 2021-05-28 NOTE — PROGRESS NOTE ADULT - ASSESSMENT
A/P: Patient is a 35y  s/p rCS now POD2  - Stable, doing well postpartum  - Hgb 10.8 > 9.4  - Pain: well controlled on PO pain meds  - GI: regular diet  - : voiding  - DVT ppx: ambulate   - Continue routine postop care  - Dispo: Home today with attending approval

## 2021-05-28 NOTE — PROGRESS NOTE ADULT - SUBJECTIVE AND OBJECTIVE BOX
GRANT DE OLIVEIRA is a 35y  s/p rCS now POD2 of a viable male infant.     SUBJECTIVE:  No acute events overnight, patient has no complaints.  Pain is well controlled with PRN pain medication.  She is ambulating, voiding, tolerating PO. Denies N/V  +flatus, -BM.  minimal lochia.    OBJECTIVE:  Physical exam:  General: AOx3, NAD.  Heart: RRR  Lungs: CTAB  Abdomen: Soft, appropriately tender to palpitation, firm uterine fundus at umbilicus. Dressing removed, incision is clean dry and intact.  Vaginal: minimal blood on pad, no bleeding on palpation of fundus  Ext: No DVT signs, warm extremities.    Vital Signs Last 24 Hrs  T(C): 36.5 (28 May 2021 03:28), Max: 37 (27 May 2021 08:40)  T(F): 97.7 (28 May 2021 03:28), Max: 98.6 (27 May 2021 08:40)  HR: 74 (28 May 2021 03:28) (74 - 87)  BP: 110/63 (28 May 2021 03:28) (110/63 - 127/72)  BP(mean): --  RR: 20 (28 May 2021 03:28) (18 - 20)  SpO2: 98% (28 May 2021 03:28) (98% - 100%)    LABS:                                   9.4    11.18 )-----------( 287      ( 27 May 2021 07:09 )             28.6     
GRANT DE OLIVEIRA is a 35y  s/p rCS now POD1 of a viable male infant.     SUBJECTIVE:  No acute events overnight, patient has no complaints.  Pain is well controlled with PRN pain medication.  She is ambulating, voiding, tolerating PO. Denies N/V  +flatus, -BM.  minimal lochia.    OBJECTIVE:  Physical exam:  General: AOx3, NAD.  Heart: RRR  Lungs: CTAB  Abdomen: Soft, appropriately tender to palpitation, firm uterine fundus at umbilicus. Dressing removed, incision is clean dry and intact.  Vaginal: minimal blood on pad, no bleeding on palpation of fundus  Ext: No DVT signs, warm extremities.    Vital Signs Last 24 Hrs  T(C): 36.6 (27 May 2021 05:00), Max: 37 (26 May 2021 09:13)  T(F): 97.9 (27 May 2021 05:00), Max: 98.6 (26 May 2021 09:13)  HR: 79 (27 May 2021 05:00) (52 - 100)  BP: 99/62 (27 May 2021 05:00) (87/52 - 118/76)  BP(mean): --  RR: 17 (27 May 2021 05:00) (15 - 19)  SpO2: 98% (27 May 2021 05:00) (98% - 100%)    LABS:                        10.8   8.62  )-----------( 324      ( 26 May 2021 06:50 )             33.4

## 2021-07-15 ENCOUNTER — RESULT REVIEW (OUTPATIENT)
Age: 36
End: 2021-07-15

## 2021-08-12 NOTE — DISCHARGE NOTE OB - NS OB DC IMMUNIZATIONS MMR YN
Patient presents to ED for re-evaluation of right leg swelling, pt was seen for same recently and advised to come back by Dr. Prince.  No new symptoms at this time.  Pt reports having fall today, but denies LOC/thinners.  
No

## 2022-03-18 ENCOUNTER — RESULT REVIEW (OUTPATIENT)
Age: 37
End: 2022-03-18

## 2022-04-06 NOTE — OB RN INTRAOPERATIVE NOTE - NS_GROUP_OBGYN_ALL_OB_FT
PVT Hydroxychloroquine Counseling:  I discussed with the patient that a baseline ophthalmologic exam is needed at the start of therapy and every year thereafter while on therapy. A CBC may also be warranted for monitoring.  The side effects of this medication were discussed with the patient, including but not limited to agranulocytosis, aplastic anemia, seizures, rashes, retinopathy, and liver toxicity. Patient instructed to call the office should any adverse effect occur.  The patient verbalized understanding of the proper use and possible adverse effects of Plaquenil.  All the patient's questions and concerns were addressed.

## 2022-04-22 ENCOUNTER — EMERGENCY (EMERGENCY)
Facility: HOSPITAL | Age: 37
LOS: 1 days | Discharge: HOME | End: 2022-04-22
Attending: STUDENT IN AN ORGANIZED HEALTH CARE EDUCATION/TRAINING PROGRAM
Payer: COMMERCIAL

## 2022-04-22 VITALS
HEART RATE: 99 BPM | WEIGHT: 130.07 LBS | DIASTOLIC BLOOD PRESSURE: 83 MMHG | OXYGEN SATURATION: 96 % | TEMPERATURE: 98 F | HEIGHT: 64 IN | RESPIRATION RATE: 18 BRPM | SYSTOLIC BLOOD PRESSURE: 112 MMHG

## 2022-04-22 VITALS — SYSTOLIC BLOOD PRESSURE: 106 MMHG | HEART RATE: 72 BPM | DIASTOLIC BLOOD PRESSURE: 82 MMHG

## 2022-04-22 DIAGNOSIS — Z98.890 OTHER SPECIFIED POSTPROCEDURAL STATES: Chronic | ICD-10-CM

## 2022-04-22 DIAGNOSIS — Z98.891 HISTORY OF UTERINE SCAR FROM PREVIOUS SURGERY: Chronic | ICD-10-CM

## 2022-04-22 LAB
ALBUMIN SERPL ELPH-MCNC: 4.4 G/DL — SIGNIFICANT CHANGE UP (ref 3.3–5.2)
ALP SERPL-CCNC: 44 U/L — SIGNIFICANT CHANGE UP (ref 40–120)
ALT FLD-CCNC: 11 U/L — SIGNIFICANT CHANGE UP
ANION GAP SERPL CALC-SCNC: 16 MMOL/L — SIGNIFICANT CHANGE UP (ref 5–17)
APPEARANCE UR: CLEAR — SIGNIFICANT CHANGE UP
AST SERPL-CCNC: 19 U/L — SIGNIFICANT CHANGE UP
BACTERIA # UR AUTO: ABNORMAL
BASOPHILS # BLD AUTO: 0.05 K/UL — SIGNIFICANT CHANGE UP (ref 0–0.2)
BASOPHILS NFR BLD AUTO: 0.5 % — SIGNIFICANT CHANGE UP (ref 0–2)
BILIRUB SERPL-MCNC: <0.2 MG/DL — LOW (ref 0.4–2)
BILIRUB UR-MCNC: NEGATIVE — SIGNIFICANT CHANGE UP
BLD GP AB SCN SERPL QL: SIGNIFICANT CHANGE UP
BUN SERPL-MCNC: 10.7 MG/DL — SIGNIFICANT CHANGE UP (ref 8–20)
CALCIUM SERPL-MCNC: 9.1 MG/DL — SIGNIFICANT CHANGE UP (ref 8.6–10.2)
CHLORIDE SERPL-SCNC: 102 MMOL/L — SIGNIFICANT CHANGE UP (ref 98–107)
CO2 SERPL-SCNC: 19 MMOL/L — LOW (ref 22–29)
COLOR SPEC: YELLOW — SIGNIFICANT CHANGE UP
CREAT SERPL-MCNC: 0.46 MG/DL — LOW (ref 0.5–1.3)
DIFF PNL FLD: ABNORMAL
EGFR: 128 ML/MIN/1.73M2 — SIGNIFICANT CHANGE UP
EOSINOPHIL # BLD AUTO: 0.09 K/UL — SIGNIFICANT CHANGE UP (ref 0–0.5)
EOSINOPHIL NFR BLD AUTO: 0.9 % — SIGNIFICANT CHANGE UP (ref 0–6)
EPI CELLS # UR: SIGNIFICANT CHANGE UP
GLUCOSE SERPL-MCNC: 66 MG/DL — LOW (ref 70–99)
GLUCOSE UR QL: NEGATIVE MG/DL — SIGNIFICANT CHANGE UP
HCG SERPL-ACNC: HIGH MIU/ML
HCT VFR BLD CALC: 34.7 % — SIGNIFICANT CHANGE UP (ref 34.5–45)
HGB BLD-MCNC: 11.4 G/DL — LOW (ref 11.5–15.5)
IMM GRANULOCYTES NFR BLD AUTO: 0.4 % — SIGNIFICANT CHANGE UP (ref 0–1.5)
KETONES UR-MCNC: ABNORMAL
LEUKOCYTE ESTERASE UR-ACNC: NEGATIVE — SIGNIFICANT CHANGE UP
LYMPHOCYTES # BLD AUTO: 1.64 K/UL — SIGNIFICANT CHANGE UP (ref 1–3.3)
LYMPHOCYTES # BLD AUTO: 16.4 % — SIGNIFICANT CHANGE UP (ref 13–44)
MCHC RBC-ENTMCNC: 29.5 PG — SIGNIFICANT CHANGE UP (ref 27–34)
MCHC RBC-ENTMCNC: 32.9 GM/DL — SIGNIFICANT CHANGE UP (ref 32–36)
MCV RBC AUTO: 89.9 FL — SIGNIFICANT CHANGE UP (ref 80–100)
MONOCYTES # BLD AUTO: 0.93 K/UL — HIGH (ref 0–0.9)
MONOCYTES NFR BLD AUTO: 9.3 % — SIGNIFICANT CHANGE UP (ref 2–14)
NEUTROPHILS # BLD AUTO: 7.24 K/UL — SIGNIFICANT CHANGE UP (ref 1.8–7.4)
NEUTROPHILS NFR BLD AUTO: 72.5 % — SIGNIFICANT CHANGE UP (ref 43–77)
NITRITE UR-MCNC: NEGATIVE — SIGNIFICANT CHANGE UP
PH UR: 5 — SIGNIFICANT CHANGE UP (ref 5–8)
PLATELET # BLD AUTO: 329 K/UL — SIGNIFICANT CHANGE UP (ref 150–400)
POTASSIUM SERPL-MCNC: 3.6 MMOL/L — SIGNIFICANT CHANGE UP (ref 3.5–5.3)
POTASSIUM SERPL-SCNC: 3.6 MMOL/L — SIGNIFICANT CHANGE UP (ref 3.5–5.3)
PROT SERPL-MCNC: 7.5 G/DL — SIGNIFICANT CHANGE UP (ref 6.6–8.7)
PROT UR-MCNC: NEGATIVE — SIGNIFICANT CHANGE UP
RBC # BLD: 3.86 M/UL — SIGNIFICANT CHANGE UP (ref 3.8–5.2)
RBC # FLD: 13.2 % — SIGNIFICANT CHANGE UP (ref 10.3–14.5)
RBC CASTS # UR COMP ASSIST: SIGNIFICANT CHANGE UP /HPF (ref 0–4)
SODIUM SERPL-SCNC: 137 MMOL/L — SIGNIFICANT CHANGE UP (ref 135–145)
SP GR SPEC: 1.03 — HIGH (ref 1.01–1.02)
UROBILINOGEN FLD QL: NEGATIVE MG/DL — SIGNIFICANT CHANGE UP
WBC # BLD: 9.99 K/UL — SIGNIFICANT CHANGE UP (ref 3.8–10.5)
WBC # FLD AUTO: 9.99 K/UL — SIGNIFICANT CHANGE UP (ref 3.8–10.5)
WBC UR QL: SIGNIFICANT CHANGE UP /HPF (ref 0–5)

## 2022-04-22 PROCEDURE — 80053 COMPREHEN METABOLIC PANEL: CPT

## 2022-04-22 PROCEDURE — 84702 CHORIONIC GONADOTROPIN TEST: CPT

## 2022-04-22 PROCEDURE — 86850 RBC ANTIBODY SCREEN: CPT

## 2022-04-22 PROCEDURE — 86901 BLOOD TYPING SEROLOGIC RH(D): CPT

## 2022-04-22 PROCEDURE — 76805 OB US >/= 14 WKS SNGL FETUS: CPT

## 2022-04-22 PROCEDURE — 81001 URINALYSIS AUTO W/SCOPE: CPT

## 2022-04-22 PROCEDURE — 99285 EMERGENCY DEPT VISIT HI MDM: CPT

## 2022-04-22 PROCEDURE — 36415 COLL VENOUS BLD VENIPUNCTURE: CPT

## 2022-04-22 PROCEDURE — 87086 URINE CULTURE/COLONY COUNT: CPT

## 2022-04-22 PROCEDURE — 85025 COMPLETE CBC W/AUTO DIFF WBC: CPT

## 2022-04-22 PROCEDURE — 99284 EMERGENCY DEPT VISIT MOD MDM: CPT | Mod: 25

## 2022-04-22 PROCEDURE — 86900 BLOOD TYPING SEROLOGIC ABO: CPT

## 2022-04-22 PROCEDURE — 76805 OB US >/= 14 WKS SNGL FETUS: CPT | Mod: 26

## 2022-04-22 NOTE — ED STATDOCS - NS ED ATTENDING STATEMENT MOD
This was a shared visit with the ADRIANA. I reviewed and verified the documentation and independently performed the documented:

## 2022-04-22 NOTE — ED STATDOCS - NSICDXPASTSURGICALHX_GEN_ALL_CORE_FT
PAST SURGICAL HISTORY:  H/O arthroscopy of right knee     History of  delivery     History of D&C

## 2022-04-22 NOTE — ED STATDOCS - CLINICAL SUMMARY MEDICAL DECISION MAKING FREE TEXT BOX
37 y/o female , at 15 weeks pregnant, presents with vaginal spotting/bleeding x1-2 days with normal outpatient US yesterday. Nontender abdomen on exam and hemodynamically stable. Concern for threatened miscarriage. Will obtain blood work, US, urine, reassess.

## 2022-04-22 NOTE — ED STATDOCS - NS ED ROS FT
General: no fevers  Head: no headaches  Eyes: no vision change  ENT: no nasal discharge/congestion, no sore throat, no ear pain  CV: no chest pain  Resp: no SOB, no cough  GI: no N/V/D, (+) abdominal cramping, no blood in stool  : no dysuria, no hematuria, (+)Vaginal bleeding  MSK: no joint pain, no muscle aches, no neck pain or back pain  Skin: no new rash  Neuro: no focal weakness, no change in sensation General: no fevers  Head: no headaches  Eyes: no vision change  ENT: no nasal discharge/congestion, no sore throat, no ear pain  CV: no chest pain  Resp: no SOB, no cough  GI: no N/V/D, (+) abdominal pressure  : no dysuria, no hematuria, (+)Vaginal bleeding  MSK: no joint pain, no muscle aches, no neck pain or back pain  Skin: no new rash  Neuro: no focal weakness, no change in sensation

## 2022-04-22 NOTE — ED STATDOCS - NSICDXPASTMEDICALHX_GEN_ALL_CORE_FT
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

## 2022-04-22 NOTE — ED STATDOCS - PHYSICAL EXAMINATION
General: well-appearing, no acute distress  Head: normocephalic, atraumatic  Eyes: PERRL   Mouth: moist mucous membranes  Neck: supple neck, no lymphadenopathy  CV: normal rate and rhythm, no LE edema, peripheral pulses 2+ bilateral UE and LE  Respiratory: clear to auscultation bilaterally  Abdomen: soft, nontender, nondistended  : no suprapubic tenderness, no CVAT  MSK: no joint deformities  Neuro: alert and oriented x3, speech clear, moving all extremities spontaneously without difficulty  Skin: no rash noted General: well-appearing young woman in no acute distress  Head: normocephalic, atraumatic  Eyes: clear eyes  Mouth: moist mucous membranes  Neck: supple neck  CV: normal rate and rhythm, no LE edema, peripheral pulses 2+ bilateral UE and LE  Respiratory: clear to auscultation bilaterally  Abdomen: soft, nontender, nondistended  : no suprapubic tenderness, no CVAT  MSK: no joint deformities  Neuro: alert and oriented x3, speech clear, moving all extremities spontaneously without difficulty  Skin: no rash noted

## 2022-04-22 NOTE — ED STATDOCS - OBJECTIVE STATEMENT
35 y/o female approximately 15 weeks pregnant, A1 with a PMHx of hypothyroidism (on Synthroid), placenta hematoma, s/p  x2, presents to the ED c/o intermittent vaginal spotting/bleeding x1-2days with clots this morning. Described as brown and red blood. Associated with lower abdominal cramping. Also reports lightheadedness and SOB, but states these symptoms are similar to past pregnancies. Saw OB yesterday, had a normal workup. Denies nausea, vomiting, diarrhea, fever, chest pain, dysuria, cough, or sore throat. Denies hx of smoking.   OB-GYN: Dr. Clementine Cui 35 y/o female approximately 15 weeks pregnant, A1 with a PMHx of hypothyroidism (on Synthroid), s/p  x2, presents to the ED c/o intermittent vaginal spotting/bleeding x1-2days with clots this morning. Described as brown and red blood. Associated with left lower abdominal pressure/cramping. Also reports lightheadedness and SOB, but states these symptoms are similar to past pregnancies and not new. Saw OB yesterday, had a normal workup and had hx of  placenta hematoma which had resolved on ultrasound in office. Denies nausea, vomiting, diarrhea, fever, chest pain, dysuria. No recent fever, cough, or sore throat.   OB-GYN: Dr. Clementine Cui

## 2022-04-22 NOTE — ED STATDOCS - PATIENT PORTAL LINK FT
You can access the FollowMyHealth Patient Portal offered by [facility] by registering at the following website: [email or web address]. By joining [facility]’s FollowMyHealth portal, you will also be able to view your health information using other applications (apps) compatible with our system.

## 2022-04-22 NOTE — ED STATDOCS - NSFOLLOWUPINSTRUCTIONS_ED_ALL_ED_FT
- Please follow up with your Primary Care Doctor in 1 - 2 days. If you cannot follow-up with your primary care doctor please return to the Emergency Department for any urgent issues.  - Seek immediate medical care for any new, worsening or concerning signs or symptoms.   - Take medications as directed, be sure to read all instructions on packaging  - You were given copies of all your test results, please bring to your primary care doctor for review of any abnormal test results  - Follow up with your Gynecologist in 2-3 days     - If you have difficulty following up, please call: [phone] or go to [email or web address] to obtain a [facility] doctor or specialist who takes your insurance in your area.    Feel better!    Subchorionic Hemorrhage    WHAT YOU NEED TO KNOW:    A subchorionic hemorrhage (SHAMEKA), or hematoma, is a collection of blood between the placenta and the uterus. SHAMEKA usually develops late in the first trimester. The bleeding usually reabsorbs into your body by 20 weeks of pregnancy. Most pregnancies progress without problems. You may have occasional spotting or light bleeding throughout your pregnancy.    DISCHARGE INSTRUCTIONS:    Return to the emergency department if:   •You have a fever.  •You have abdominal pain.  •You have a sudden increase in bleeding.      Contact your healthcare provider if:   •Your bleeding has increased.  •You have questions or concerns about your condition or care.    Pelvic rest: Do not have sex, douche, or use tampons. Do not strain or lift heavy objects. These activities may cause contractions or infection and put you or your baby at risk. You may need to rest more than usual. Do daily activities as directed.    Follow up with your healthcare provider as directed: You may need to return frequently for ultrasounds. Write down your questions so you remember to ask them during your visits - Please follow up with your Primary Care Doctor in 1 - 2 days. If you cannot follow-up with your primary care doctor please return to the Emergency Department for any urgent issues.  - Seek immediate medical care for any new, worsening or concerning signs or symptoms.   - Take medications as directed, be sure to read all instructions on packaging  - You were given copies of all your test results, please bring to your primary care doctor for review of any abnormal test results  - Follow up with your Gynecologist in 2-3 days     - If you have difficulty following up, please call: [phone] or go to [email or web address] to obtain a [facility] doctor or specialist who takes your insurance in your area.    Feel better!    Subchorionic Hemorrhage    WHAT YOU NEED TO KNOW:    A subchorionic hemorrhage (SHAMEKA), or hematoma, is a collection of blood between the placenta and the uterus. SHAMEKA usually develops late in the first trimester. The bleeding usually reabsorbs into your body by 20 weeks of pregnancy. Most pregnancies progress without problems. You may have occasional spotting or light bleeding throughout your pregnancy.    DISCHARGE INSTRUCTIONS:    Return to the emergency department if:   •You have a fever.  •You have abdominal pain.  •You have a sudden increase in bleeding.      Contact your healthcare provider if:   •Your bleeding has increased.  •You have questions or concerns about your condition or care.    Pelvic rest: Do not have sex, douche, or use tampons. Do not strain or lift heavy objects. These activities may cause contractions or infection and put you or your baby at risk. You may need to rest more than usual. Do daily activities as directed.  -------------------------------  Vaginal Bleeding During Pregnancy, Second Trimester    A small amount of bleeding from the vagina, or spotting, is common during early pregnancy. Some bleeding may be related to the pregnancy, and some may not. In many cases, the bleeding is normal and is not a problem. However, bleeding can also be a sign of something serious.    Normal things may cause bleeding during the second trimester. They include:  •Rapid changes in blood vessels. This is caused by changes that are happening to the body during pregnancy.  •Sex.  •Pelvic exams.    Abnormal things that may cause bleeding during the second trimester include:  •Infection, inflammation, or growths on the cervix. The growths on the cervix are also called polyps.  •A condition in which the placenta partially or completely covers the opening of the cervix (placenta previa).  •The placenta  from the uterus (placenta abruption).  •Miscarriage.  •Early labor, also called  labor.  •The cervix opening and thinning before pregnancy is at term and before labor starts (cervical insufficiency).  •A mass of tissue developing in the uterus due to an egg being fertilized incorrectly (molar pregnancy).    Tell your health care provider about any vaginal bleeding right away.      Follow these instructions at home:    Monitor your bleeding.  •Pay attention to any changes in your symptoms. Let your health care provider know about any concerns.  •Try to understand when the bleeding occurs. Does the bleeding start on its own, or does it start after something is done, such as sex or a pelvic exam?  •Use a diary to record the things you see about your bleeding, including:  •The kind of bleeding you are having. Does the bleeding start and stop irregularly, or is it a constant flow?  •The severity of your bleeding. Is the bleeding heavy or light?  •The number of pads you use each day, how often you change them, and how soaked they are.  •Tell your health care provider if you pass tissue. He or she may want to see it.    Activity   •Follow instructions from your health care provider about limiting your activity. Ask what activities are safe for you.  • Do not exercise or do activities that take a lot of effort unless your health care provider approves.  • Do not have sex until your health care provider says that this is safe.  • Do not lift anything that is heavier than 10 lb (4.5 kg), or the limit that you are told, until your health care provider says that it is safe.  •If needed, make plans for someone to help with your regular activities.    Medicines   •Take over-the-counter and prescription medicines only as told by your health care provider.  • Do not take aspirin because it can cause bleeding.      General instructions     • Do not use tampons or douche.  •Keep all follow-up visits. This is important.    Contact a health care provider if:  •You have vaginal bleeding during any time of your pregnancy.  •You have cramps or labor pains.  •You have a fever that does not get better when you take medicines.    Get help right away if:  •You have severe cramps in your back or abdomen  •You have contractions.  •You have chills.  •Your bleeding increases or you pass large clots or a large amount of tissue from your vagina.  •You feel light-headed or weak, or you faint.  •You are leaking fluid or have a gush of fluid from your vagina.        Summary    •A small amount of bleeding, or spotting, from the vagina is common during early pregnancy.  •Many things can cause vaginal bleeding during pregnancy. Tell your health care provider about any bleeding right away.  •Try to understand when bleeding occurs. Does bleeding occur on its own, or does it occur after something is done, such as sex or pelvic exams?  •Follow instructions from your health care provider about limiting your activity. Ask what activities are safe for you.  •Keep all follow-up visits. This is important.

## 2022-04-24 LAB
CULTURE RESULTS: NO GROWTH — SIGNIFICANT CHANGE UP
SPECIMEN SOURCE: SIGNIFICANT CHANGE UP

## 2022-05-31 ENCOUNTER — NON-APPOINTMENT (OUTPATIENT)
Age: 37
End: 2022-05-31

## 2022-06-07 PROBLEM — Z00.00 ENCOUNTER FOR PREVENTIVE HEALTH EXAMINATION: Status: ACTIVE | Noted: 2022-06-07

## 2022-06-14 ENCOUNTER — APPOINTMENT (OUTPATIENT)
Dept: MATERNAL FETAL MEDICINE | Facility: CLINIC | Age: 37
End: 2022-06-14

## 2022-06-14 ENCOUNTER — APPOINTMENT (OUTPATIENT)
Dept: ANTEPARTUM | Facility: CLINIC | Age: 37
End: 2022-06-14
Payer: COMMERCIAL

## 2022-06-14 ENCOUNTER — ASOB RESULT (OUTPATIENT)
Age: 37
End: 2022-06-14

## 2022-06-14 ENCOUNTER — TRANSCRIPTION ENCOUNTER (OUTPATIENT)
Age: 37
End: 2022-06-14

## 2022-06-14 ENCOUNTER — NON-APPOINTMENT (OUTPATIENT)
Age: 37
End: 2022-06-14

## 2022-06-14 VITALS
WEIGHT: 150 LBS | BODY MASS INDEX: 26.58 KG/M2 | SYSTOLIC BLOOD PRESSURE: 98 MMHG | HEIGHT: 63 IN | DIASTOLIC BLOOD PRESSURE: 52 MMHG

## 2022-06-14 DIAGNOSIS — Z78.9 OTHER SPECIFIED HEALTH STATUS: ICD-10-CM

## 2022-06-14 DIAGNOSIS — O99.282 ENDOCRINE, NUTRITIONAL AND METABOLIC DISEASES COMPLICATING PREGNANCY, SECOND TRIMESTER: ICD-10-CM

## 2022-06-14 DIAGNOSIS — Z3A.23 23 WEEKS GESTATION OF PREGNANCY: ICD-10-CM

## 2022-06-14 DIAGNOSIS — R77.2 ABNORMALITY OF ALPHAFETOPROTEIN: ICD-10-CM

## 2022-06-14 DIAGNOSIS — Z86.39 PERSONAL HISTORY OF OTHER ENDOCRINE, NUTRITIONAL AND METABOLIC DISEASE: ICD-10-CM

## 2022-06-14 DIAGNOSIS — E03.9 ENDOCRINE, NUTRITIONAL AND METABOLIC DISEASES COMPLICATING PREGNANCY, SECOND TRIMESTER: ICD-10-CM

## 2022-06-14 PROCEDURE — 76816 OB US FOLLOW-UP PER FETUS: CPT

## 2022-06-14 PROCEDURE — 76817 TRANSVAGINAL US OBSTETRIC: CPT

## 2022-06-14 PROCEDURE — 99204 OFFICE O/P NEW MOD 45 MIN: CPT | Mod: 1L

## 2022-06-14 RX ORDER — LEVOTHYROXINE SODIUM 50 UG/1
50 TABLET ORAL
Qty: 90 | Refills: 0 | Status: ACTIVE | COMMUNITY
Start: 2022-04-08

## 2022-06-14 RX ORDER — VITAMIN C, CALCIUM, IRON, VITAMIN D3, VITAMIN E, VITAMIN B1, VITAMIN B2, VITAMIN B3, VITAMIN B6, FOLIC ACID, IODINE, ZINC, COPPER, DOCUSATE SODIUM, DOCOSAHEXAENOIC ACID (DHA) 27-1-50 MG
KIT ORAL
Refills: 0 | Status: ACTIVE | COMMUNITY

## 2022-06-17 ENCOUNTER — APPOINTMENT (OUTPATIENT)
Dept: PEDIATRIC CARDIOLOGY | Facility: CLINIC | Age: 37
End: 2022-06-17
Payer: COMMERCIAL

## 2022-06-17 PROCEDURE — 99204 OFFICE O/P NEW MOD 45 MIN: CPT | Mod: 25

## 2022-06-17 PROCEDURE — 76820 UMBILICAL ARTERY ECHO: CPT

## 2022-06-17 PROCEDURE — 76821 MIDDLE CEREBRAL ARTERY ECHO: CPT

## 2022-06-17 PROCEDURE — 76825 ECHO EXAM OF FETAL HEART: CPT

## 2022-06-17 PROCEDURE — 76827 ECHO EXAM OF FETAL HEART: CPT

## 2022-06-17 PROCEDURE — 93325 DOPPLER ECHO COLOR FLOW MAPG: CPT | Mod: 59

## 2022-06-24 ENCOUNTER — APPOINTMENT (OUTPATIENT)
Dept: MRI IMAGING | Facility: CLINIC | Age: 37
End: 2022-06-24

## 2022-06-24 ENCOUNTER — OUTPATIENT (OUTPATIENT)
Dept: OUTPATIENT SERVICES | Facility: HOSPITAL | Age: 37
LOS: 1 days | End: 2022-06-24

## 2022-06-24 ENCOUNTER — APPOINTMENT (OUTPATIENT)
Dept: PEDIATRIC CARDIOLOGY | Facility: CLINIC | Age: 37
End: 2022-06-24
Payer: COMMERCIAL

## 2022-06-24 DIAGNOSIS — Z98.890 OTHER SPECIFIED POSTPROCEDURAL STATES: Chronic | ICD-10-CM

## 2022-06-24 DIAGNOSIS — Z98.891 HISTORY OF UTERINE SCAR FROM PREVIOUS SURGERY: Chronic | ICD-10-CM

## 2022-06-24 DIAGNOSIS — O35.9XX0 MATERNAL CARE FOR (SUSPECTED) FETAL ABNORMALITY AND DAMAGE, UNSPECIFIED, NOT APPLICABLE OR UNSPECIFIED: ICD-10-CM

## 2022-06-24 DIAGNOSIS — O43.212 PLACENTA ACCRETA, SECOND TRIMESTER: ICD-10-CM

## 2022-06-24 PROCEDURE — 76826 ECHO EXAM OF FETAL HEART: CPT

## 2022-06-24 PROCEDURE — 76820 UMBILICAL ARTERY ECHO: CPT

## 2022-06-24 PROCEDURE — 76821 MIDDLE CEREBRAL ARTERY ECHO: CPT

## 2022-06-24 PROCEDURE — 76828 ECHO EXAM OF FETAL HEART: CPT

## 2022-06-24 PROCEDURE — 99215 OFFICE O/P EST HI 40 MIN: CPT | Mod: 25

## 2022-06-24 PROCEDURE — 72195 MRI PELVIS W/O DYE: CPT | Mod: 26

## 2022-06-24 PROCEDURE — 93325 DOPPLER ECHO COLOR FLOW MAPG: CPT | Mod: 59

## 2022-06-27 ENCOUNTER — NON-APPOINTMENT (OUTPATIENT)
Age: 37
End: 2022-06-27

## 2022-06-27 PROBLEM — O35.9XX0 SUSPECTED FETAL ANOMALY, ANTEPARTUM: Status: ACTIVE | Noted: 2022-06-14

## 2022-07-01 ENCOUNTER — APPOINTMENT (OUTPATIENT)
Dept: CARDIOTHORACIC SURGERY | Facility: CLINIC | Age: 37
End: 2022-07-01

## 2022-07-01 PROCEDURE — 99245 OFF/OP CONSLTJ NEW/EST HI 55: CPT

## 2022-07-05 ENCOUNTER — ASOB RESULT (OUTPATIENT)
Age: 37
End: 2022-07-05

## 2022-07-05 ENCOUNTER — APPOINTMENT (OUTPATIENT)
Dept: MATERNAL FETAL MEDICINE | Facility: CLINIC | Age: 37
End: 2022-07-05

## 2022-07-05 ENCOUNTER — NON-APPOINTMENT (OUTPATIENT)
Age: 37
End: 2022-07-05

## 2022-07-05 PROCEDURE — 99204 OFFICE O/P NEW MOD 45 MIN: CPT | Mod: 25

## 2022-07-13 ENCOUNTER — APPOINTMENT (OUTPATIENT)
Dept: OBGYN | Facility: CLINIC | Age: 37
End: 2022-07-13

## 2022-07-13 PROCEDURE — 76805 OB US >/= 14 WKS SNGL FETUS: CPT

## 2022-07-13 PROCEDURE — 99242 OFF/OP CONSLTJ NEW/EST SF 20: CPT

## 2022-07-15 ENCOUNTER — NON-APPOINTMENT (OUTPATIENT)
Age: 37
End: 2022-07-15

## 2022-07-18 ENCOUNTER — NON-APPOINTMENT (OUTPATIENT)
Age: 37
End: 2022-07-18

## 2022-07-19 ENCOUNTER — APPOINTMENT (OUTPATIENT)
Dept: ANTEPARTUM | Facility: CLINIC | Age: 37
End: 2022-07-19

## 2022-07-20 ENCOUNTER — TRANSCRIPTION ENCOUNTER (OUTPATIENT)
Age: 37
End: 2022-07-20

## 2022-07-20 ENCOUNTER — NON-APPOINTMENT (OUTPATIENT)
Age: 37
End: 2022-07-20

## 2022-07-25 ENCOUNTER — APPOINTMENT (OUTPATIENT)
Dept: OBGYN | Facility: CLINIC | Age: 37
End: 2022-07-25

## 2022-07-25 PROCEDURE — ZZZZZ: CPT

## 2022-07-27 ENCOUNTER — APPOINTMENT (OUTPATIENT)
Dept: OTHER | Facility: CLINIC | Age: 37
End: 2022-07-27

## 2022-07-27 PROCEDURE — 99204 OFFICE O/P NEW MOD 45 MIN: CPT | Mod: 95

## 2022-07-28 ENCOUNTER — APPOINTMENT (OUTPATIENT)
Dept: UROLOGY | Facility: CLINIC | Age: 37
End: 2022-07-28

## 2022-07-28 VITALS
WEIGHT: 151 LBS | HEIGHT: 63 IN | SYSTOLIC BLOOD PRESSURE: 104 MMHG | RESPIRATION RATE: 17 BRPM | TEMPERATURE: 98 F | DIASTOLIC BLOOD PRESSURE: 55 MMHG | HEART RATE: 87 BPM | BODY MASS INDEX: 26.75 KG/M2

## 2022-07-28 DIAGNOSIS — O43.212 PLACENTA ACCRETA, SECOND TRIMESTER: ICD-10-CM

## 2022-07-28 DIAGNOSIS — O44.02 COMPLETE PLACENTA PREVIA NOS OR WITHOUT HEMORRHAGE, SECOND TRIMESTER: ICD-10-CM

## 2022-07-28 PROCEDURE — 99203 OFFICE O/P NEW LOW 30 MIN: CPT

## 2022-08-01 PROBLEM — O43.212 PLACENTA ACCRETA IN SECOND TRIMESTER: Status: ACTIVE | Noted: 2022-06-14

## 2022-08-04 ENCOUNTER — APPOINTMENT (OUTPATIENT)
Dept: OBGYN | Facility: CLINIC | Age: 37
End: 2022-08-04

## 2022-08-04 ENCOUNTER — APPOINTMENT (OUTPATIENT)
Dept: ANTEPARTUM | Facility: CLINIC | Age: 37
End: 2022-08-04

## 2022-08-04 VITALS — SYSTOLIC BLOOD PRESSURE: 112 MMHG | BODY MASS INDEX: 27.63 KG/M2 | DIASTOLIC BLOOD PRESSURE: 66 MMHG | WEIGHT: 156 LBS

## 2022-08-04 PROCEDURE — 76816 OB US FOLLOW-UP PER FETUS: CPT

## 2022-08-04 PROCEDURE — 76819 FETAL BIOPHYS PROFIL W/O NST: CPT | Mod: 59

## 2022-08-04 PROCEDURE — 76817 TRANSVAGINAL US OBSTETRIC: CPT

## 2022-08-04 PROCEDURE — 0502F SUBSEQUENT PRENATAL CARE: CPT

## 2022-08-09 ENCOUNTER — APPOINTMENT (OUTPATIENT)
Dept: PEDIATRIC CARDIOLOGY | Facility: CLINIC | Age: 37
End: 2022-08-09

## 2022-08-09 DIAGNOSIS — O09.522 SUPERVISION OF ELDERLY MULTIGRAVIDA, SECOND TRIMESTER: ICD-10-CM

## 2022-08-09 DIAGNOSIS — O35.8XX0 MATERNAL CARE FOR OTHER (SUSPECTED) FETAL ABNORMALITY AND DAMAGE, NOT APPLICABLE OR UNSPECIFIED: ICD-10-CM

## 2022-08-09 PROCEDURE — 93325 DOPPLER ECHO COLOR FLOW MAPG: CPT | Mod: 59

## 2022-08-09 PROCEDURE — 99215 OFFICE O/P EST HI 40 MIN: CPT | Mod: 25

## 2022-08-09 PROCEDURE — 76828 ECHO EXAM OF FETAL HEART: CPT

## 2022-08-09 PROCEDURE — 76826 ECHO EXAM OF FETAL HEART: CPT

## 2022-08-09 PROCEDURE — 76821 MIDDLE CEREBRAL ARTERY ECHO: CPT

## 2022-08-09 PROCEDURE — 76820 UMBILICAL ARTERY ECHO: CPT

## 2022-08-15 ENCOUNTER — OUTPATIENT (OUTPATIENT)
Dept: OUTPATIENT SERVICES | Facility: HOSPITAL | Age: 37
LOS: 1 days | End: 2022-08-15

## 2022-08-15 VITALS
HEART RATE: 89 BPM | TEMPERATURE: 98 F | HEIGHT: 64 IN | OXYGEN SATURATION: 99 % | RESPIRATION RATE: 16 BRPM | WEIGHT: 156.97 LBS | DIASTOLIC BLOOD PRESSURE: 61 MMHG | SYSTOLIC BLOOD PRESSURE: 97 MMHG

## 2022-08-15 DIAGNOSIS — O43.219 PLACENTA ACCRETA, UNSPECIFIED TRIMESTER: ICD-10-CM

## 2022-08-15 DIAGNOSIS — Z98.890 OTHER SPECIFIED POSTPROCEDURAL STATES: Chronic | ICD-10-CM

## 2022-08-15 DIAGNOSIS — Z98.891 HISTORY OF UTERINE SCAR FROM PREVIOUS SURGERY: Chronic | ICD-10-CM

## 2022-08-15 DIAGNOSIS — E03.9 HYPOTHYROIDISM, UNSPECIFIED: ICD-10-CM

## 2022-08-15 LAB
A1C WITH ESTIMATED AVERAGE GLUCOSE RESULT: 4.8 % — SIGNIFICANT CHANGE UP (ref 4–5.6)
APPEARANCE UR: CLEAR — SIGNIFICANT CHANGE UP
BILIRUB UR-MCNC: NEGATIVE — SIGNIFICANT CHANGE UP
BLD GP AB SCN SERPL QL: NEGATIVE — SIGNIFICANT CHANGE UP
COLOR SPEC: SIGNIFICANT CHANGE UP
DIFF PNL FLD: NEGATIVE — SIGNIFICANT CHANGE UP
ESTIMATED AVERAGE GLUCOSE: 91 — SIGNIFICANT CHANGE UP
GLUCOSE UR QL: NEGATIVE — SIGNIFICANT CHANGE UP
HCT VFR BLD CALC: 27.3 % — LOW (ref 34.5–45)
HGB BLD-MCNC: 8.9 G/DL — LOW (ref 11.5–15.5)
KETONES UR-MCNC: NEGATIVE — SIGNIFICANT CHANGE UP
LEUKOCYTE ESTERASE UR-ACNC: ABNORMAL
MCHC RBC-ENTMCNC: 28.3 PG — SIGNIFICANT CHANGE UP (ref 27–34)
MCHC RBC-ENTMCNC: 32.6 GM/DL — SIGNIFICANT CHANGE UP (ref 32–36)
MCV RBC AUTO: 86.7 FL — SIGNIFICANT CHANGE UP (ref 80–100)
NITRITE UR-MCNC: NEGATIVE — SIGNIFICANT CHANGE UP
NRBC # BLD: 0 /100 WBCS — SIGNIFICANT CHANGE UP
NRBC # FLD: 0 K/UL — SIGNIFICANT CHANGE UP
PH UR: 6.5 — SIGNIFICANT CHANGE UP (ref 5–8)
PLATELET # BLD AUTO: 309 K/UL — SIGNIFICANT CHANGE UP (ref 150–400)
PROT UR-MCNC: NEGATIVE — SIGNIFICANT CHANGE UP
RBC # BLD: 3.15 M/UL — LOW (ref 3.8–5.2)
RBC # FLD: 16.2 % — HIGH (ref 10.3–14.5)
RH IG SCN BLD-IMP: POSITIVE — SIGNIFICANT CHANGE UP
SP GR SPEC: 1.01 — LOW (ref 1.01–1.05)
UROBILINOGEN FLD QL: SIGNIFICANT CHANGE UP
WBC # BLD: 10 K/UL — SIGNIFICANT CHANGE UP (ref 3.8–10.5)
WBC # FLD AUTO: 10 K/UL — SIGNIFICANT CHANGE UP (ref 3.8–10.5)

## 2022-08-15 RX ORDER — SODIUM CHLORIDE 9 MG/ML
1000 INJECTION, SOLUTION INTRAVENOUS
Refills: 0 | Status: DISCONTINUED | OUTPATIENT
Start: 2022-08-29 | End: 2022-08-29

## 2022-08-15 RX ORDER — OXYTOCIN 10 UNIT/ML
333.33 VIAL (ML) INJECTION
Qty: 20 | Refills: 0 | Status: DISCONTINUED | OUTPATIENT
Start: 2022-08-29 | End: 2022-08-29

## 2022-08-15 RX ORDER — CITRIC ACID/SODIUM CITRATE 300-500 MG
30 SOLUTION, ORAL ORAL ONCE
Refills: 0 | Status: DISCONTINUED | OUTPATIENT
Start: 2022-08-29 | End: 2022-08-29

## 2022-08-15 RX ORDER — SODIUM CHLORIDE 9 MG/ML
1000 INJECTION, SOLUTION INTRAVENOUS ONCE
Refills: 0 | Status: DISCONTINUED | OUTPATIENT
Start: 2022-08-29 | End: 2022-08-29

## 2022-08-15 NOTE — OB PST NOTE - NS_OBGYNHISTORY_OBGYN_ALL_OB_FT
placenta accreta with current pregnancy placenta accreta with current pregnancy  h/o  section x2 in  &

## 2022-08-15 NOTE — OB PST NOTE - NSICDXPASTSURGICALHX_GEN_ALL_CORE_FT
PAST SURGICAL HISTORY:  H/O arthroscopy of right knee     History of  delivery 2020    History of D&C

## 2022-08-15 NOTE — OB PST NOTE - NSMATERNALFETALCONCERNS_OBGYN_ALL_OB_FT
MATERNAL FETAL ALERT  7/6/22  - Pelvic MRI - markedly abnormal placenta with placenta ACCRETA spectrum.  Discontinuous bladder wall concerning for PERCRETA. -Hermelinda Maki, RNC  S/P PLACENTAL MRI   UROLOGY CONSULT WITH DR Pastora HARRIS OR CASE for 8/29/2022  ABNORMAL FETAL ECHO -TAPVR  alert NICU ASAP   Katlin Saldana RN 7/26/2022  alert blood bank , anesthesia and NICU

## 2022-08-15 NOTE — OB PST NOTE - HISTORY OF PRESENT ILLNESS
38 y/o female presents to PST preop for repeat  section- placenta accreta, possible hysterectomy. pt had a  section at 40 weeks in  due to unsuccessful induction. pt had a repeat  in  at 39 weeks.

## 2022-08-15 NOTE — OB PST NOTE - NSHPPHYSICALEXAM_GEN_ALL_CORE
Constitutional: Well Developed, Well Groomed, Well Nourished, No Distress    Eyes: PERRL, EOMI, conjunctiva clear    Ears: Normal    Mouth & Gums: Normal, moist    Pharynx: No tenderness, discharge, or peritonsillar abscess    Tonsils: No Redness, discharge, tenderness, or swelling    Neck: Supple, no JVD, normal thyroid glands, no carotid bruits, no cervical vertebral or paraspinal tenderness    Breast: Normal shape, no masses, no tenderness, nipples normal, no nipple discharge    Back: Normal shape, ROM intact, strength intact, no vertebral tenderness    Respiratory: Airway patent, breath sounds equal, good air movement, respiration non-labored, clear to auscultation bilateral, no chest wall tenderness, no intercostal retractions, no rales, no wheezes, no rhonchi, no subcutaneous emphysema    Cardiovascular:  Regular rate and rhythm, no rubs or murmur, normal PMI    Gastrointestinal: pregnant abdomen. pt reports fetal movement during PST interview.     Extremities: No clubbing, cyanosis, or pedal edema    Vascular:  Carotid Pulse normal , Radial Pulse normal, Femoral Pulse normal, DP pulse normal, PT pulse normal    Neurological: alert & oriented x 3, sensation intact, deep reflexes intact, cranial nerve intact, normal strength    Skin: warm and dry, normal color    Lymph Nodes: normal posterior cervical lymph node, normal anterior cervical lymph node, normal supraclavicular lymph node, normal axillary lymph node, normal inguinal lymph node, normal femoral lymph node    Musculoskeletal: ROM intact, no joint swelling, warmth, or calf tenderness. Normal strength    Psychiatric: normal affect, normal behavior Constitutional: Well Developed, Well Groomed, Well Nourished, No Distress    Eyes: PERRL, EOMI, conjunctiva clear    Ears: Normal    Mouth & Gums: Normal, moist    Pharynx: No tenderness, discharge, or peritonsillar abscess    Tonsils: No Redness, discharge, tenderness, or swelling    Neck: Supple, no JVD, normal thyroid glands, no carotid bruits, no cervical vertebral or paraspinal tenderness    Breast: Normal shape, no masses    Back: Normal shape, ROM intact, strength intact, no vertebral tenderness    Respiratory: Airway patent, breath sounds equal, good air movement, respiration non-labored, clear to auscultation bilateral, no chest wall tenderness, no intercostal retractions, no rales, no wheezes, no rhonchi, no subcutaneous emphysema    Cardiovascular:  Regular rate and rhythm, no rubs or murmur, normal PMI    Gastrointestinal: pregnant abdomen. pt reports fetal movements during PST interview.     Extremities: No clubbing, cyanosis, or pedal edema    Vascular:  Carotid Pulse normal , Radial Pulse normal    Neurological: alert & oriented x 3, sensation intact, deep reflexes intact, cranial nerve intact, normal strength    Skin: warm and dry, normal color    Lymph Nodes: normal posterior cervical lymph node, normal anterior cervical lymph node, normal supraclavicular lymph node    Musculoskeletal: ROM intact, no joint swelling, warmth, or calf tenderness. Normal strength    Psychiatric: normal affect, normal behavior

## 2022-08-15 NOTE — OB PST NOTE - PROBLEM SELECTOR PLAN 1
preop for repeat  section- placenta accreta, possible hysterectomy on 22  preop instructions given, pt verbalized understanding   chlorhexidine wash provided  pt informed COVID testing must be done 72 hours prior to surgery  CBC, UA, type pending preop for repeat  section- placenta accreta, possible hysterectomy on 22  preop instructions given, pt verbalized understanding  chlorhexidine wash provided  pt informed COVID testing must be done 72 hours prior to surgery  CBC, UA, Hga1c, type pending

## 2022-08-15 NOTE — OB PST NOTE - NSHPREVIEWOFSYSTEMS_GEN_ALL_CORE
General: pregnancy weight gain. No fever, chills, sweating, anorexia, weight loss. No polyphagia, polyuria, polydipsia, malaise, or fatigue    Skin: No rashes, itching, or dryness. No change in size/color of moles. No tumors, brittle nails, pitted nails, or hair loss    Breast: No tenderness, lumps, or nipple discharge      Ophthalmologic: No diplopia, photophobia, lacrimation, blurred Vision , or eye discharge    ENMT Symptoms: No hearing difficulty, ear pain, tinnitus, or vertigo. No sinus symptoms, nasal congestion, nasal   discharge, or nasal obstruction    Respiratory and Thorax: No wheezing, dyspnea, cough, hemoptysis, or pleuritic chest pain     Cardiovascular: No chest pain, palpitations, dyspnea on exertion, orthopnea, paroxysmal nocturnal dyspnea,   peripheral edema, or claudication    Gastrointestinal: No nausea, vomiting, diarrhea, constipation, change in bowel habits, flatulence, abdominal pain, or melena    Genitourinary/ Pelvis: No hematuria, renal colic, or flank pain.  No urine discoloration, incontinence, dysuria, or urinary hesitancy. Normal urinary frequency. No nocturia, abnormal vaginal bleeding, vaginal discharge, spotting, pelvic pain, or vaginal leakage    Musculoskeletal: No arthralgia, arthritis, joint swelling, muscle cramping, muscle weakness, neck pain, arm pain, or leg pain    Neurological: No transient paralysis, weakness, paresthesias, or seizures. No syncope, tremors, vertigo, loss of sensation, difficulty walking, loss of consciousness, hemiparesis, confusion, or facial palsy    Psychiatric: No suicidal ideation, depression, anxiety, insomnia, memory loss, paranoia, mood swings, agitation, hallucinations, or hyperactivity    Hematology: anemia in pregnancy. on iron supplements. No gum bleeding, nose bleeding, or skin lumps    Lymphatic: No enlarged or tender lymph nodes. No extremity swelling    Endocrine: Hypothyroid. pt current on Synthroid daily. pt denies recent dose change. No heat or cold intolerance    Immunologic: No recurrent or persistent infections General: pregnancy weight gain. No fever, chills, sweating, anorexia, weight loss. No polyphagia, polyuria, polydipsia, malaise, or fatigue    Skin: No rashes, itching, or dryness. No change in size/color of moles. No tumors, brittle nails, pitted nails, or hair loss    Breast: No tenderness, lumps, or nipple discharge      Ophthalmologic: No diplopia, photophobia, lacrimation, blurred Vision , or eye discharge    ENMT Symptoms: No hearing difficulty, ear pain, tinnitus, or vertigo. No sinus symptoms, nasal congestion, nasal   discharge, or nasal obstruction    Respiratory and Thorax: No wheezing, dyspnea, cough, hemoptysis, or pleuritic chest pain     Cardiovascular: No chest pain, palpitations, dyspnea on exertion, orthopnea, paroxysmal nocturnal dyspnea,   peripheral edema, or claudication    Gastrointestinal: No nausea, vomiting, diarrhea, constipation, change in bowel habits, flatulence, abdominal pain, or melena    Genitourinary/ Pelvis: No hematuria, renal colic, or flank pain.  No urine discoloration, incontinence, dysuria, or urinary hesitancy. Normal urinary frequency. No nocturia, abnormal vaginal bleeding, vaginal discharge, spotting, pelvic pain, or vaginal leakage    Musculoskeletal: No arthralgia, arthritis, joint swelling, muscle cramping, muscle weakness, neck pain, arm pain, or leg pain    Neurological: No transient paralysis, weakness, paresthesias, or seizures. No syncope, tremors, vertigo, loss of sensation, difficulty walking, loss of consciousness, hemiparesis, confusion, or facial palsy    Psychiatric: No suicidal ideation, depression, anxiety, insomnia, memory loss, paranoia, mood swings, agitation, hallucinations, or hyperactivity    Hematology: anemia in pregnancy. on iron supplements. No gum bleeding, nose bleeding, or skin lumps    Lymphatic: No enlarged or tender lymph nodes. No extremity swelling    Endocrine: Hypothyroid. pt currently on Synthroid daily. she denies recent dose change. No heat or cold intolerance    Immunologic: No recurrent or persistent infections

## 2022-08-15 NOTE — OB PST NOTE - NSICDXPASTMEDICALHX_GEN_ALL_CORE_FT
PAST MEDICAL HISTORY:  Anemia with pregnancy    COVID-19 4-21    Hypothyroidism     Knee pain cartilage with dead bone formation  8/15/22- resolved    Placenta accreta

## 2022-08-22 ENCOUNTER — APPOINTMENT (OUTPATIENT)
Dept: ANTEPARTUM | Facility: CLINIC | Age: 37
End: 2022-08-22

## 2022-08-22 ENCOUNTER — APPOINTMENT (OUTPATIENT)
Dept: OBGYN | Facility: CLINIC | Age: 37
End: 2022-08-22

## 2022-08-22 VITALS — BODY MASS INDEX: 27.99 KG/M2 | WEIGHT: 158 LBS | SYSTOLIC BLOOD PRESSURE: 115 MMHG | DIASTOLIC BLOOD PRESSURE: 65 MMHG

## 2022-08-22 PROCEDURE — 76816 OB US FOLLOW-UP PER FETUS: CPT

## 2022-08-22 PROCEDURE — 0502F SUBSEQUENT PRENATAL CARE: CPT

## 2022-08-23 ENCOUNTER — INPATIENT (INPATIENT)
Facility: HOSPITAL | Age: 37
LOS: 0 days | Discharge: HOME | End: 2022-08-24
Attending: OBSTETRICS & GYNECOLOGY | Admitting: OBSTETRICS & GYNECOLOGY

## 2022-08-23 VITALS
DIASTOLIC BLOOD PRESSURE: 49 MMHG | SYSTOLIC BLOOD PRESSURE: 104 MMHG | RESPIRATION RATE: 16 BRPM | TEMPERATURE: 98 F | HEART RATE: 81 BPM

## 2022-08-23 DIAGNOSIS — O26.899 OTHER SPECIFIED PREGNANCY RELATED CONDITIONS, UNSPECIFIED TRIMESTER: ICD-10-CM

## 2022-08-23 DIAGNOSIS — Z98.890 OTHER SPECIFIED POSTPROCEDURAL STATES: Chronic | ICD-10-CM

## 2022-08-23 DIAGNOSIS — Z98.891 HISTORY OF UTERINE SCAR FROM PREVIOUS SURGERY: Chronic | ICD-10-CM

## 2022-08-23 DIAGNOSIS — Z3A.00 WEEKS OF GESTATION OF PREGNANCY NOT SPECIFIED: ICD-10-CM

## 2022-08-23 LAB
ALBUMIN SERPL ELPH-MCNC: 3.8 G/DL — SIGNIFICANT CHANGE UP (ref 3.3–5)
ALP SERPL-CCNC: 135 U/L — HIGH (ref 40–120)
ALT FLD-CCNC: 17 U/L — SIGNIFICANT CHANGE UP (ref 4–33)
ANION GAP SERPL CALC-SCNC: 9 MMOL/L — SIGNIFICANT CHANGE UP (ref 7–14)
APTT BLD: 28.6 SEC — SIGNIFICANT CHANGE UP (ref 27–36.3)
AST SERPL-CCNC: 19 U/L — SIGNIFICANT CHANGE UP (ref 4–32)
BILIRUB SERPL-MCNC: 0.2 MG/DL — SIGNIFICANT CHANGE UP (ref 0.2–1.2)
BLD GP AB SCN SERPL QL: NEGATIVE — SIGNIFICANT CHANGE UP
BUN SERPL-MCNC: 6 MG/DL — LOW (ref 7–23)
CALCIUM SERPL-MCNC: 8.9 MG/DL — SIGNIFICANT CHANGE UP (ref 8.4–10.5)
CHLORIDE SERPL-SCNC: 105 MMOL/L — SIGNIFICANT CHANGE UP (ref 98–107)
CO2 SERPL-SCNC: 22 MMOL/L — SIGNIFICANT CHANGE UP (ref 22–31)
COVID-19 SPIKE DOMAIN AB INTERP: POSITIVE
COVID-19 SPIKE DOMAIN ANTIBODY RESULT: >250 U/ML — HIGH
CREAT SERPL-MCNC: 0.46 MG/DL — LOW (ref 0.5–1.3)
EGFR: 127 ML/MIN/1.73M2 — SIGNIFICANT CHANGE UP
FIBRINOGEN PPP-MCNC: 665 MG/DL — HIGH (ref 330–520)
GLUCOSE SERPL-MCNC: 73 MG/DL — SIGNIFICANT CHANGE UP (ref 70–99)
HCT VFR BLD CALC: 30.8 % — LOW (ref 34.5–45)
HGB BLD-MCNC: 10.1 G/DL — LOW (ref 11.5–15.5)
MCHC RBC-ENTMCNC: 28.5 PG — SIGNIFICANT CHANGE UP (ref 27–34)
MCHC RBC-ENTMCNC: 32.8 GM/DL — SIGNIFICANT CHANGE UP (ref 32–36)
MCV RBC AUTO: 86.8 FL — SIGNIFICANT CHANGE UP (ref 80–100)
NRBC # BLD: 0 /100 WBCS — SIGNIFICANT CHANGE UP (ref 0–0)
NRBC # FLD: 0 K/UL — SIGNIFICANT CHANGE UP (ref 0–0)
PLATELET # BLD AUTO: 291 K/UL — SIGNIFICANT CHANGE UP (ref 150–400)
POTASSIUM SERPL-MCNC: 4.3 MMOL/L — SIGNIFICANT CHANGE UP (ref 3.5–5.3)
POTASSIUM SERPL-SCNC: 4.3 MMOL/L — SIGNIFICANT CHANGE UP (ref 3.5–5.3)
PROT SERPL-MCNC: 6.6 G/DL — SIGNIFICANT CHANGE UP (ref 6–8.3)
RBC # BLD: 3.55 M/UL — LOW (ref 3.8–5.2)
RBC # FLD: 16.5 % — HIGH (ref 10.3–14.5)
RH IG SCN BLD-IMP: POSITIVE — SIGNIFICANT CHANGE UP
SARS-COV-2 IGG+IGM SERPL QL IA: >250 U/ML — HIGH
SARS-COV-2 IGG+IGM SERPL QL IA: POSITIVE
SARS-COV-2 RNA SPEC QL NAA+PROBE: SIGNIFICANT CHANGE UP
SODIUM SERPL-SCNC: 136 MMOL/L — SIGNIFICANT CHANGE UP (ref 135–145)
WBC # BLD: 7.71 K/UL — SIGNIFICANT CHANGE UP (ref 3.8–10.5)
WBC # FLD AUTO: 7.71 K/UL — SIGNIFICANT CHANGE UP (ref 3.8–10.5)

## 2022-08-23 PROCEDURE — 99222 1ST HOSP IP/OBS MODERATE 55: CPT | Mod: GC

## 2022-08-23 RX ORDER — FOLIC ACID 0.8 MG
1 TABLET ORAL DAILY
Refills: 0 | Status: DISCONTINUED | OUTPATIENT
Start: 2022-08-23 | End: 2022-08-24

## 2022-08-23 RX ORDER — LEVOTHYROXINE SODIUM 125 MCG
50 TABLET ORAL DAILY
Refills: 0 | Status: DISCONTINUED | OUTPATIENT
Start: 2022-08-23 | End: 2022-08-24

## 2022-08-23 RX ORDER — FERROUS SULFATE 325(65) MG
325 TABLET ORAL DAILY
Refills: 0 | Status: DISCONTINUED | OUTPATIENT
Start: 2022-08-23 | End: 2022-08-24

## 2022-08-23 RX ADMIN — Medication 325 MILLIGRAM(S): at 18:18

## 2022-08-23 RX ADMIN — Medication 1 MILLIGRAM(S): at 18:18

## 2022-08-23 RX ADMIN — Medication 12 MILLIGRAM(S): at 12:22

## 2022-08-23 RX ADMIN — Medication 1 TABLET(S): at 18:18

## 2022-08-23 NOTE — OB RN TRIAGE NOTE - NS_OBGYNHISTORY_OBGYN_ALL_OB_FT
C/S 2020 failed induction  c/s 2021 repeat  SAB X1 with D&C C/S 1/29/2020 failed induction  c/s5/26/2021  repeat  SAB X1 with D&C

## 2022-08-23 NOTE — OB RN TRIAGE NOTE - CURRENT PREGNANCY COMPLICATIONS, OB PROFILE
Gestational Age less than 36 Weeks low hemoglobin/Gestational Age less than 36 Weeks/Placenta Accreta/Other

## 2022-08-23 NOTE — OB PROVIDER H&P - NSHPPHYSICALEXAM_GEN_ALL_CORE
Vital Signs Last 24 Hrs  T(C): 36.8 (23 Aug 2022 13:09), Max: 36.8 (23 Aug 2022 13:09)  T(F): 98.2 (23 Aug 2022 13:09), Max: 98.2 (23 Aug 2022 13:09)  HR: 75 (23 Aug 2022 13:09) (75 - 81)  BP: 97/49 (23 Aug 2022 13:09) (95/51 - 107/55)  BP(mean): --  RR: 15 (23 Aug 2022 13:09) (15 - 16)  SpO2: --    Parameters below as of 23 Aug 2022 13:09  Patient On (Oxygen Delivery Method): room air    Gen: NAD  Abd: soft, non-tender gravid  Ext: Vital Signs Last 24 Hrs  T(C): 36.8 (23 Aug 2022 13:09), Max: 36.8 (23 Aug 2022 13:09)  T(F): 98.2 (23 Aug 2022 13:09), Max: 98.2 (23 Aug 2022 13:09)  HR: 75 (23 Aug 2022 13:09) (75 - 81)  BP: 97/49 (23 Aug 2022 13:09) (95/51 - 107/55)  BP(mean): --  RR: 15 (23 Aug 2022 13:09) (15 - 16)  SpO2: --    Parameters below as of 23 Aug 2022 13:09  Patient On (Oxygen Delivery Method): room air    Gen: NAD, well appearing, comfortable  Abd: soft, non-tender gravid  Ext: wnl    FHR baseline 130, moderate variability, +accels, no decels; reactive and reassuring  Coyle: irreg ctx    TAUS: cephalic, RESHMA 14cm, BPP 8/8  SSE: cervical os visually closed, "spec" of bright red blood in vaginal vault <1cc, no active bleeding appreciated

## 2022-08-23 NOTE — OB PROVIDER H&P - ASSESSMENT
A/P: 36yo  at 33w2d with known placenta previa and placenta accreta spectrum who p/w her first episode of vaginal bleeding today. Anemic with Hb 8.9 from 8/15. Will admit to antepartum for observation overnight and betamethasone x2 for fetal lung maturity.    #Placenta previa, placenta accreta spectrum  - Admit to antepartum  - CBC, CMP, coags, T&S  - IM betamethasone x2 doses due  and  for fetal lung maturity  - Patient is scheduled for c/s on  with Urology     #Fetal cardiac anomalies  - TAVPVR, VSD, cannot r/o ASD  - Peds cards consulted out-patient  - Will notify peds cards that patient is in house    #Maternal PNC  - Heplock  - Reg diet    #Fetal PNC  - NST BID      Karlie PGY5  d/w Dr Cruz [facility]

## 2022-08-23 NOTE — OB RN TRIAGE NOTE - DOMESTIC TRAVEL HIGH RISK QUESTION
PRE-SEDATION ASSESSMENT    CONSENT  Consent for procedure and sedation obtained: Yes    MEDICAL HISTORY  Significant medical/surgical history: No  Past Complications with Sedation/Anesthesia: No  Significant Family History: No  Smoking History: No  Alcohol/Drug abuse: No  Possible Pregnancy (LMP): Not Applicable  Cardiac History: No  Respiratory History: No    PHYSICAL EXAM  History and Physical Reviewed: H&P completed today  Airway Risk History: Snoring;No previous complications  Airway Anatomy : Class II  Heart : Normal  Lungs : Normal  LOC/Mental Status : Normal    OTHER FINDINGS  Reviewed current medications and allergies: Yes  Pertinent lab/diagnostic test reviewed: Yes    SEDATION RISK ASSESSMENT  Risk Status ASA: Class II - Normal patient with mild systemic disease  Plan for Sedation: Moderate Sedation  Indications for Procedure/Pre-Procedure Diagnosis and Planned Procedure: Moderate sedation for MRI brain and MRA neck for claustrophobia/anxiety  EKG Monitoring: Yes    NARRATIVE FINDINGS     
No

## 2022-08-23 NOTE — OB PROVIDER H&P - NSHPLABSRESULTS_GEN_ALL_CORE
10.1   7.71  )-----------( 291      ( 23 Aug 2022 12:10 )             30.8       PTT - ( 23 Aug 2022 12:40 )  PTT:28.6 sec    A positive    Fibrinogen 665

## 2022-08-23 NOTE — OB RN TRIAGE NOTE - FALL HARM RISK - UNIVERSAL INTERVENTIONS
Bed in lowest position, wheels locked, appropriate side rails in place/Call bell, personal items and telephone in reach/Instruct patient to call for assistance before getting out of bed or chair/Non-slip footwear when patient is out of bed/[place] to call system/Physically safe environment - no spills, clutter or unnecessary equipment/Purposeful Proactive Rounding/Room/bathroom lighting operational, light cord in reach

## 2022-08-23 NOTE — OB RN TRIAGE NOTE - MENTAL HEALTH CONDITIONS/SYMPTOMS, PROFILE
2/12/2019         RE: Eleazar Herrera  [address] [address]        Dear Colleague,    Thank you for referring your patient, Eleazar Herrera, to the [facility]. Please see a copy of my visit note below.    Patient is here to go over the MRI.  The MRI is not showing anything obvious.  And I think that if we had a comparison of both sides it would show what is causing this left scapula to stick out so far.    We discussed the findings and I did review this with another surgeon who did not see any obvious mass or lipoma either.   I think for cost effectiveness a ct scan of the chest to see both sides less expensively will give a good idea of what is causing the left scapula to stick out further.  If it is all muscle and no tumor as the MRI would suggest then orthopedics may be his best option.  And would be nice to do a less expensive imaging study like a ct scan of the chest.     Time spent with the patient with greater that 50% of the time in discussion was 30 minutes.  In discussing the findings. .      Ari Armando MD        Study Result     MRI LEFT SCAPULA WITHOUT AND WITH CONTRAST  2/6/2019 8:33 PM     HISTORY: Mass in the left scapula needs to be seen well. Benign  lipomatous neoplasm of skin and subcutaneous tissue of trunk.     COMPARISON: None.     TECHNIQUE: Multiplanar MR imaging is performed through the left  posterior upper chest before and after the uneventful intravenous  administration of 10 mL Gadavist contrast. Markers were placed over  the posterior chest wall at the sites of clinical concern. One is  posterior and superior to the scapula, and the second is medial to the  inferior scapula.     FINDINGS: No fracture or osseous lesion is seen. No abnormal marrow  signal intensity is identified. No abnormal soft tissue mass or fluid  collection is seen. No abnormal signal intensity or contrast  enhancement is identified.                                                                        IMPRESSION: Unremarkable MRI of the left posterior chest wall.  Specifically, no abnormal mass is identified.     ANTONY ENCISO MD             [facility]  [address]  [facility] [address]  Phone: [phone]  Fax: [phone]                  2/11/2019           RE:      Eleazar Herrera  [address] [facility] [address]           Dear Colleague,     Thank you for referring your patient, Eleazar Herrera, to the [facility]. Please see a copy of my visit note below.     Sports Medicine Clinic Visit     PCP: Scot Raygoza     Eleazar Herrera is a 44 year old male who is seen  in consultation at the request of  Ari Armando M.D. presenting with chronic lower back pain     Injury: He reports a chronic history of lower back pain for 9 years. He has had a discectomy in 2013. He reports the procedure decreased his leg pain, however, has had on and off chronic low back pain since then. He reports he has a lipoma of the skin in his thoracic spine and has difficulty lifting his left arm. He reports due to the pain in his upper back he feels the lower back pain has increased, acutely 2 weeks ago.  He reports no radiating pain to his lower extremities, but he reports muscle spasms occassionally in the lower back. He reports pain from his upper back radiate to the left shoulder. He reports the pain increases with climbing ladders and bending to the left side. He uses an inversion table almost daily.  Overall low back pain has been improving the last couple weeks.  Hasn't done PT in a while.     Eleazar was asked to complete the Oswestry Low Back Disability Index and Jose Alfredo Start Back screening tool.  today in the office.  Disability score: 33.33%.      Location of Pain: upper, lower back  Duration of Pain: 9 year(s)  Rating of Pain at worst: 10/10  Rating of Pain  Currently: 4/10  Symptoms are better with: rest  Symptoms are worse with: climbing ladders and bending to the left side  Additional Features:              Positive: none              Negative: swelling, bruising, popping, grinding, catching, locking, instability, paresthesias and numbness  Other evaluation and/or treatments so far consists of: Ibuprofen and Rest  Prior History of related problems: Discectomy of L5-S1 in 2013.     Social History: , desk work     Review of Systems  Skin: no bruising, no swelling  Musculoskeletal: as above  Neurologic: no numbness, paresthesias  Remainder of review of systems is negative including constitutional, CV, pulmonary, GI, except as noted in HPI or medical history.     Patient's current problem list, past medical and surgical history, and family history were reviewed.         Patient Active Problem List   Diagnosis     Lumbago     Family history of Parkinson's disease     Hyperlipidemia LDL goal <130     Tremor     Lumbosacral disc herniation     Anxiety     Change in hearing     Snores     Sleep disorder     EMMA (obstructive sleep apnea)           Past Medical History:   Diagnosis Date     Change in hearing 9/21/2018     Family history of Parkinson's disease 11/1/2011     Lumbago 11/1/2011     Snores 9/21/2018     Tremor       Tremor 10/31/2012            Past Surgical History:   Procedure Laterality Date     DISCECTOMY LUMBAR POSTERIOR MICROSCOPIC ONE LEVEL   5/23/2013     Procedure: DISCECTOMY LUMBAR POSTERIOR MICROSCOPIC ONE LEVEL;  Left Lumbar 5-Sacral 1 Microdiscectomy ;  Surgeon: James Curran MD;  Location: [facility] OR     epidural injection         back problem     SINUS SURGERY         TONSILLECTOMY                Family History   Problem Relation Age of Onset     Parkinsonism Mother       Restless Leg Syndrome Father       Parkinsonism Maternal Grandfather       Diabetes Sister           type i     C.A.D. No family hx of       Cancer No  none family hx of           Objective  /78 (BP Location: Right arm, Patient Position: Chair, Cuff Size: Adult Regular)   Ht 1.829 m (6')   Wt 104.8 kg (231 lb)   BMI 31.33 kg/m        GENERAL APPEARANCE: healthy, alert and no distress   GAIT: NORMAL  SKIN: no suspicious lesions or rashes  HEENT: Sclera clear, anicteric  CV: good peripheral pulses  RESP: Breathing not labored  NEURO: Normal strength and tone, mentation intact and speech normal  PSYCH:  mentation appears normal and affect normal/bright     Low back exam:  Inspection:     normal skin       normal vascular       normal lymphatic       Lipoma left side of thoracic spine x2, well healed lumbar spine     Posture:      rounded shoulders and upper back       lumbar lordosis diminished     Foot Inspection:     no deformity noted     Tender:     None currently     Non Tender:     remainder of lumbar spine     ROM:      full flexion       limited extension due to pain     Strength:     hip flexion 5/5 bilateral       knee extension 5/5 bilateral       ankle dorsiflexion 5/5 bilateral       ankle plantarflexion 5/5 bilateral       dorsiflexion of the great toe 5/5 bilateral       able to heel and toe walk     Reflexes:     patellar (L3, L4) symmetric normal       achilles tendons (S1) symmetric normal     Sensation:    grossly intact throughout lower extremities     Special tests:      straight leg raise left negative        straight leg raise right negative       neg (-) CARLTON  bilateral       neg (-) FADIR  bilateral     Radiology  I ordered, visualized and reviewed these images with the patient  2 XR views of lumbar spine reviewed: degenerative change at L5-S1 level  - will follow official read     I visualized and reviewed these images with the patient  MRI LUMBAR SPINE WITHOUT AND WITH CONTRAST   2/6/2019 8:31 PM   HISTORY: Chronic right-sided low back pain with left-sided sciatica.   TECHNIQUE: Multiplanar, multisequence MRI of the lumbar spine  without  and with 10 mL Gadavist.   COMPARISON: Lumbar spine MR 3/18/2015, 5/7/2013, and 12/15/2009.   FINDINGS: There are 5 lumbar-type vertebral bodies assumed for the  purposes of this dictation. The distal spinal cord and cauda equina  appear normal without abnormal enhancement.   Alignment of the lumbar spine is normal. No loss of vertebral body  height. There are no destructive osseous lesions. Marked loss of  intervertebral disc height with disc desiccation and Modic type  degenerative endplate changes at L5-S1. There is STIR hyperintense  endplate marrow edema at L5-S1 asymmetrically increased in the right.  Mild loss of disc height also at L3-4 and L4-5.   Level by level as follows:   T12-L1: No significant spinal canal or neural foraminal narrowing.   L1-L2: No significant spinal canal or neural foraminal narrowing.   L2-L3: No significant spinal canal or neural foraminal narrowing.   L3-L4: Mild posterior disc bulge results in mild bilateral neural  foraminal narrowing. No spinal canal narrowing.   L4-L5: Mild posterior disc bulge results in mild to moderate bilateral  neural foraminal narrowing. No significant spinal canal narrowing.   L5-S1: Postoperative changes of left-sided laminectomy and previous  discectomy. Again seen is mild circumferential disc bulge and endplate  osteophytic spurring with superimposed left subarticular disc  protrusion. Disc material abuts but does not significantly displace  the left S1 nerve root in the lateral recess. There is mild bilateral  neural foraminal narrowing. No significant spinal canal narrowing.  Paraspinous soft tissues: Normal.                                                            IMPRESSION:    1. Postoperative changes of left-sided laminectomy and discectomy at  L5-S1 again seen. Slight worsening of circumferential disc bulge and  endplate osteophytic spurring at this level. Persistent superimposed  left subarticular disc protrusion without significant  compression of  the vascular structures. Mild bilateral neural foraminal narrowing is  not significantly changed. Endplate marrow edema at L5-S1 has slightly  increased since prior.  2. Mild degenerative disc changes at L3-4 and L4-5 without significant  change since prior.     Assessment:  1. Chronic bilateral low back pain without sciatica    2. Lumbar degenerative disc disease    3. Mechanical low back pain    4. Lipoma of torso       Chronic low back pain with evidence of degenerative changes and mild disc herniation on MRI.  Overall reassuring exam currently with improving pain.  We discussed physical therapy for core strengthening, patient may wait until he has surgery on his thoracic lipoma.  We also discussed potential for further treatments if symptoms worsen.        Plan:  - Today's Plan of Care:  Monitor symptoms     -We also discussed other future treatment options:  Referral to spine surgeon, Rehab: Physical Therapy or Steroid injection of lower back     Follow Up: as needed     Concerning signs and symptoms were reviewed.  The patient expressed understanding of this management plan and all questions were answered at this time.     Thanks for the opportunity to participate in the care of this patient, I will keep you updated on their progress.     CC: Ari Daugherty MD [facility]  Primary Care Sports Medicine  [facility]     Again, thank you for allowing me to participate in the care of your patient.          Sincerely,           Mei Daugherty MD      Again, thank you for allowing me to participate in the care of your patient.        Sincerely,        Ari Armando MD

## 2022-08-23 NOTE — OB PROVIDER H&P - HISTORY OF PRESENT ILLNESS
38yo  at 33w2d with known placenta previa and placenta accreta spectrum who p/w her first episode of vaginal bleeding today. She says that she went to stand up this morning at 7:30am and felt a "gush" of blood, bright red about amount of a light period. She then went to wipe and noticed some light pink spotting and says that the bleeding has now since totally resolved.  36yo  at 33w2d with known placenta previa and placenta accreta spectrum who p/w her first episode of vaginal bleeding today. She says that she went to stand up this morning at 7:30am and felt a "gush" of blood, bright red about amount of a light period. She then went to wipe and noticed some light pink spotting and says that the bleeding has now since totally resolved. She denies any recent abdominal trauma, falls, or vigorous activity. Patient has not had any recent sexual intercourse given known placenta accreta spectrum. Also denies leakage of fluid, endorses good fetal movement.     Antepartum:  (a) Late transfer of care to Dr. Cruz at 27weeks GA  (b) Pregnancy c/b placenta previa and placenta accreta spectrum  MRI 22: Markedly abnormal placenta compatible with placenta accreta spectrum abnormality. Discontinuous bladder wall concerning for percreta. Complete placenta previa.  Urology consult Dr. Guillory 22: plan for cystoscopy and ureteral catheter placement at time of scheduled c/s  (c) Fetal congenital heart disease; fetal echo 22: TAPVR, cannot r/o ASD, co-ventricular VSD. Peds cards Dr. Castanon consulted  GBS unk  EFW 2100g on sonogram yesterday  with Dr. Cruz    ObHx:  G1 2019 MAB D&C at 11wks  G2  primary LTCS for failed rrIOL at 40wks, 3290g  G3  scheduled repeat LTCS, breech at 39wks, 3090g  G4 current  GynHx: denies   PMH: denies  PSH: c/s x2, D&C, R knee surgery for osteochondritis dissecans age 16yo  NKDA  Meds: synthroid 50mcg qd, PNV, PO iron  Social: denies T/E/D

## 2022-08-24 ENCOUNTER — TRANSCRIPTION ENCOUNTER (OUTPATIENT)
Age: 37
End: 2022-08-24

## 2022-08-24 VITALS
DIASTOLIC BLOOD PRESSURE: 59 MMHG | RESPIRATION RATE: 16 BRPM | HEART RATE: 83 BPM | OXYGEN SATURATION: 100 % | TEMPERATURE: 98 F | SYSTOLIC BLOOD PRESSURE: 105 MMHG

## 2022-08-24 DIAGNOSIS — O20.8 OTHER HEMORRHAGE IN EARLY PREGNANCY: ICD-10-CM

## 2022-08-24 LAB
HCT VFR BLD CALC: 33.4 % — LOW (ref 34.5–45)
HGB BLD-MCNC: 10.8 G/DL — LOW (ref 11.5–15.5)
MCHC RBC-ENTMCNC: 27.7 PG — SIGNIFICANT CHANGE UP (ref 27–34)
MCHC RBC-ENTMCNC: 32.3 GM/DL — SIGNIFICANT CHANGE UP (ref 32–36)
MCV RBC AUTO: 85.6 FL — SIGNIFICANT CHANGE UP (ref 80–100)
NRBC # BLD: 0 /100 WBCS — SIGNIFICANT CHANGE UP (ref 0–0)
NRBC # FLD: 0 K/UL — SIGNIFICANT CHANGE UP (ref 0–0)
PLATELET # BLD AUTO: 287 K/UL — SIGNIFICANT CHANGE UP (ref 150–400)
RBC # BLD: 3.9 M/UL — SIGNIFICANT CHANGE UP (ref 3.8–5.2)
RBC # FLD: 16.3 % — HIGH (ref 10.3–14.5)
WBC # BLD: 13.41 K/UL — HIGH (ref 3.8–10.5)
WBC # FLD AUTO: 13.41 K/UL — HIGH (ref 3.8–10.5)

## 2022-08-24 PROCEDURE — 99231 SBSQ HOSP IP/OBS SF/LOW 25: CPT | Mod: GC

## 2022-08-24 RX ORDER — FOLIC ACID 0.8 MG
1 TABLET ORAL
Qty: 0 | Refills: 0 | DISCHARGE
Start: 2022-08-24

## 2022-08-24 RX ORDER — FERROUS SULFATE 325(65) MG
1 TABLET ORAL
Qty: 0 | Refills: 0 | DISCHARGE
Start: 2022-08-24

## 2022-08-24 RX ADMIN — Medication 1 TABLET(S): at 14:08

## 2022-08-24 RX ADMIN — Medication 12 MILLIGRAM(S): at 12:30

## 2022-08-24 RX ADMIN — Medication 1 MILLIGRAM(S): at 14:08

## 2022-08-24 RX ADMIN — Medication 325 MILLIGRAM(S): at 14:08

## 2022-08-24 RX ADMIN — Medication 50 MICROGRAM(S): at 06:18

## 2022-08-24 NOTE — DISCHARGE NOTE ANTEPARTUM - CARE PROVIDER_API CALL
Carl Cruz)  MaternalFetal Medicine; Obstetrics and Gynecology  [address] [place] [address]  Phone: [phone]  Fax: [phone]  Follow Up Time:

## 2022-08-24 NOTE — DISCHARGE NOTE ANTEPARTUM - PLAN OF CARE
- Return with any further vaginal bleeding/spotting, contractions, leaking fluid or decreased fetal movement  - Return on  for scheduled

## 2022-08-24 NOTE — DISCHARGE NOTE ANTEPARTUM - HOSPITAL COURSE
38yo  at 33w3d with known placenta previa and placenta accreta spectrum who p/w her first episode of vaginal bleeding. Anemic with Hb 8.9 from 8/15. Pt admitted to antepartum for observation overnight, blood transfusion pre-op, and betamethasone x2 for fetal lung maturity.  H/H: 10.8/33.4 s/p 2u pRBCs. Patient is scheduled for c/s on  with Urology consult. Fetal cardiac anomalies; TAVPVR, VSD, cannot r/o ASD.  Peds cards consulted out-patient. Patient stable for discharge home to return Monday for C/S as scheduled.

## 2022-08-24 NOTE — PROGRESS NOTE ADULT - ATTENDING COMMENTS
PAtient seen and examined  No complains, no bleeding or contractions  She has received two units of PRBC and is receiving steroids  If sh has no further bleeding will discharge her home for planned surgery on 8/29/2022

## 2022-08-24 NOTE — DISCHARGE NOTE ANTEPARTUM - PATIENT PORTAL LINK FT
You can access the FollowMyHealth Patient Portal offered by [facility] by registering at the following website: [email or web address]. By joining [employer]’s FollowMyHealth portal, you will also be able to view your health information using other applications (apps) compatible with our system.

## 2022-08-24 NOTE — PROGRESS NOTE ADULT - SUBJECTIVE AND OBJECTIVE BOX
R3 Antepartum Note, HD#2    Interval events:    Patient seen and examined at bedside, no acute overnight events. Pt s/p 2u pRBCs transfusion for pre-op optimization. Pt feels well, denies any further episodes of vaginal bleeding overnight. Pt reports +FM, denies LOF, VB, ctx, HA, epigastric pain, blurred vision, CP, SOB, N/V, fevers, and chills.    Vital Signs Last 24 Hours  T(C): 36.6 (08-24-22 @ 05:49), Max: 37.2 (08-23-22 @ 16:05)  HR: 73 (08-24-22 @ 05:49) (70 - 101)  BP: 101/50 (08-24-22 @ 05:49) (95/51 - 112/59)  RR: 16 (08-24-22 @ 05:49) (15 - 18)  SpO2: 98% (08-24-22 @ 05:49) (90% - 100%)    Physical Exam:  General: NAD  Abdomen: Soft, non-tender, gravid  Ext: No pain or swelling    NST reactive overnight    Labs:             10.8   x     )-----------( 287      ( 08-24 @ 03:53 )             33.4     08-23 @ 12:40    136  |  105  |  6   ----------------------------<  73  4.3   |  22  |  0.46    Ca    8.9      08-23 @ 12:40    TPro  6.6  /  Alb  3.8  /  TBili  0.2  /  DBili  x   /  AST  19  /  ALT  17  /  AlkPhos  135  08-23 @ 12:40      PTT - ( 08-23 @ 12:40 )  PTT:28.6 sec    Uric Acid: (08-23 @ 12:40)  --       Fibrinogen: (08-23 @ 12:40)  665      LDH: (08-23 @ 12:40)  --         MEDICATIONS  (STANDING):  betamethasone Injectable 12 milliGRAM(s) IntraMuscular every 24 hours  ferrous    sulfate 325 milliGRAM(s) Oral daily  folic acid 1 milliGRAM(s) Oral daily  levothyroxine 50 MICROGram(s) Oral daily  prenatal multivitamin 1 Tablet(s) Oral daily

## 2022-08-24 NOTE — DISCHARGE NOTE ANTEPARTUM - NS MD DC FALL RISK RISK
For information on Fall & Injury Prevention, visit: [email or web address] OR  [email or web address] OR  https://www.cdc.gov/steadi/patient.html

## 2022-08-24 NOTE — DISCHARGE NOTE ANTEPARTUM - CARE PLAN
1 Principal Discharge DX:	Vaginal bleeding in pregnancy  Assessment and plan of treatment:	- Return with any further vaginal bleeding/spotting, contractions, leaking fluid or decreased fetal movement  - Return on  for scheduled

## 2022-08-24 NOTE — PROGRESS NOTE ADULT - ASSESSMENT
A/P: 38yo  at 33w3d with known placenta previa and placenta accreta spectrum who p/w her first episode of vaginal bleeding. Anemic with Hb 8.9 from 8/15. Pt admitted to antepartum for observation overnight, blood transfusion pre-op, and betamethasone x2 for fetal lung maturity.    #Placenta previa, placenta accreta spectrum  - H/H: 10.8/33.4 s/p 2u pRBCs  - IM betamethasone x2 doses due  and  for fetal lung maturity  - Patient is scheduled for c/s on  with Urology consult     #Fetal cardiac anomalies  - TAVPVR, VSD, cannot r/o ASD  - Peds cards consulted out-patient  - Peds cards & NICU aware pt is in house    #Maternal PNC  - Heplock  - Reg diet    #Fetal PNC  - NST JESSICA Higginbotham, PGY3

## 2022-08-24 NOTE — DISCHARGE NOTE ANTEPARTUM - MEDICATION SUMMARY - MEDICATIONS TO TAKE
I will START or STAY ON the medications listed below when I get home from the hospital:    Prenatal Multivitamins oral tablet (obsolete)  -- orally once a day  -- Indication: For prenatal vitamins    ferrous sulfate 325 mg (65 mg elemental iron) oral tablet  -- 1 tab(s) by mouth once a day  -- Indication: For anemia    Synthroid 50 mcg (0.05 mg) oral tablet  -- 1 tab(s) by mouth once a day  -- Indication: For hypothyroid    folic acid 1 mg oral tablet  -- 1 tab(s) by mouth once a day  -- Indication: For home med

## 2022-08-25 PROBLEM — O09.522 MULTIGRAVIDA OF ADVANCED MATERNAL AGE IN SECOND TRIMESTER: Status: ACTIVE | Noted: 2022-06-14

## 2022-08-25 PROBLEM — O35.8XX0 CONGENITAL HEART DISEASE, FETAL, AFFECTING CARE OF MOTHER, ANTEPARTUM: Status: ACTIVE | Noted: 2022-06-27

## 2022-08-28 ENCOUNTER — TRANSCRIPTION ENCOUNTER (OUTPATIENT)
Age: 37
End: 2022-08-28

## 2022-08-29 ENCOUNTER — INPATIENT (INPATIENT)
Facility: HOSPITAL | Age: 37
LOS: 1 days | Discharge: HOME | End: 2022-08-31
Attending: OBSTETRICS & GYNECOLOGY | Admitting: OBSTETRICS & GYNECOLOGY

## 2022-08-29 ENCOUNTER — RESULT REVIEW (OUTPATIENT)
Age: 37
End: 2022-08-29

## 2022-08-29 VITALS
RESPIRATION RATE: 16 BRPM | HEART RATE: 81 BPM | SYSTOLIC BLOOD PRESSURE: 107 MMHG | TEMPERATURE: 98 F | DIASTOLIC BLOOD PRESSURE: 52 MMHG

## 2022-08-29 DIAGNOSIS — Z98.891 HISTORY OF UTERINE SCAR FROM PREVIOUS SURGERY: Chronic | ICD-10-CM

## 2022-08-29 DIAGNOSIS — O43.219 PLACENTA ACCRETA, UNSPECIFIED TRIMESTER: ICD-10-CM

## 2022-08-29 DIAGNOSIS — Z98.890 OTHER SPECIFIED POSTPROCEDURAL STATES: Chronic | ICD-10-CM

## 2022-08-29 LAB
ANION GAP SERPL CALC-SCNC: 10 MMOL/L — SIGNIFICANT CHANGE UP (ref 7–14)
BASOPHILS # BLD AUTO: 0.06 K/UL — SIGNIFICANT CHANGE UP (ref 0–0.2)
BASOPHILS # BLD AUTO: 0.09 K/UL — SIGNIFICANT CHANGE UP (ref 0–0.2)
BASOPHILS NFR BLD AUTO: 0.3 % — SIGNIFICANT CHANGE UP (ref 0–2)
BASOPHILS NFR BLD AUTO: 0.7 % — SIGNIFICANT CHANGE UP (ref 0–2)
BLD GP AB SCN SERPL QL: NEGATIVE — SIGNIFICANT CHANGE UP
BUN SERPL-MCNC: 5 MG/DL — LOW (ref 7–23)
CALCIUM SERPL-MCNC: 8.1 MG/DL — LOW (ref 8.4–10.5)
CHLORIDE SERPL-SCNC: 104 MMOL/L — SIGNIFICANT CHANGE UP (ref 98–107)
CO2 SERPL-SCNC: 20 MMOL/L — LOW (ref 22–31)
COVID-19 SPIKE DOMAIN AB INTERP: POSITIVE
COVID-19 SPIKE DOMAIN ANTIBODY RESULT: >250 U/ML — HIGH
CREAT SERPL-MCNC: 0.35 MG/DL — LOW (ref 0.5–1.3)
EGFR: 136 ML/MIN/1.73M2 — SIGNIFICANT CHANGE UP
EOSINOPHIL # BLD AUTO: 0.01 K/UL — SIGNIFICANT CHANGE UP (ref 0–0.5)
EOSINOPHIL # BLD AUTO: 0.23 K/UL — SIGNIFICANT CHANGE UP (ref 0–0.5)
EOSINOPHIL NFR BLD AUTO: 0 % — SIGNIFICANT CHANGE UP (ref 0–6)
EOSINOPHIL NFR BLD AUTO: 1.7 % — SIGNIFICANT CHANGE UP (ref 0–6)
GAS PNL BLDA: SIGNIFICANT CHANGE UP
GLUCOSE SERPL-MCNC: 117 MG/DL — HIGH (ref 70–99)
HCT VFR BLD CALC: 29.6 % — LOW (ref 34.5–45)
HCT VFR BLD CALC: 38.5 % — SIGNIFICANT CHANGE UP (ref 34.5–45)
HGB BLD-MCNC: 12.3 G/DL — SIGNIFICANT CHANGE UP (ref 11.5–15.5)
HGB BLD-MCNC: 9.5 G/DL — LOW (ref 11.5–15.5)
IANC: 18.71 K/UL — HIGH (ref 1.8–7.4)
IANC: 8.44 K/UL — HIGH (ref 1.8–7.4)
IMM GRANULOCYTES NFR BLD AUTO: 0.8 % — SIGNIFICANT CHANGE UP (ref 0–1.5)
IMM GRANULOCYTES NFR BLD AUTO: 1 % — SIGNIFICANT CHANGE UP (ref 0–1.5)
LYMPHOCYTES # BLD AUTO: 1.17 K/UL — SIGNIFICANT CHANGE UP (ref 1–3.3)
LYMPHOCYTES # BLD AUTO: 24.7 % — SIGNIFICANT CHANGE UP (ref 13–44)
LYMPHOCYTES # BLD AUTO: 3.35 K/UL — HIGH (ref 1–3.3)
LYMPHOCYTES # BLD AUTO: 5.6 % — LOW (ref 13–44)
MCHC RBC-ENTMCNC: 27.6 PG — SIGNIFICANT CHANGE UP (ref 27–34)
MCHC RBC-ENTMCNC: 28.2 PG — SIGNIFICANT CHANGE UP (ref 27–34)
MCHC RBC-ENTMCNC: 31.9 GM/DL — LOW (ref 32–36)
MCHC RBC-ENTMCNC: 32.1 GM/DL — SIGNIFICANT CHANGE UP (ref 32–36)
MCV RBC AUTO: 86 FL — SIGNIFICANT CHANGE UP (ref 80–100)
MCV RBC AUTO: 88.3 FL — SIGNIFICANT CHANGE UP (ref 80–100)
MONOCYTES # BLD AUTO: 0.67 K/UL — SIGNIFICANT CHANGE UP (ref 0–0.9)
MONOCYTES # BLD AUTO: 1.34 K/UL — HIGH (ref 0–0.9)
MONOCYTES NFR BLD AUTO: 3.2 % — SIGNIFICANT CHANGE UP (ref 2–14)
MONOCYTES NFR BLD AUTO: 9.9 % — SIGNIFICANT CHANGE UP (ref 2–14)
NEUTROPHILS # BLD AUTO: 18.71 K/UL — HIGH (ref 1.8–7.4)
NEUTROPHILS # BLD AUTO: 8.44 K/UL — HIGH (ref 1.8–7.4)
NEUTROPHILS NFR BLD AUTO: 62 % — SIGNIFICANT CHANGE UP (ref 43–77)
NEUTROPHILS NFR BLD AUTO: 90.1 % — HIGH (ref 43–77)
NRBC # BLD: 0 /100 WBCS — SIGNIFICANT CHANGE UP (ref 0–0)
NRBC # FLD: 0 K/UL — SIGNIFICANT CHANGE UP (ref 0–0)
PLATELET # BLD AUTO: 268 K/UL — SIGNIFICANT CHANGE UP (ref 150–400)
PLATELET # BLD AUTO: 345 K/UL — SIGNIFICANT CHANGE UP (ref 150–400)
POTASSIUM SERPL-MCNC: 3.5 MMOL/L — SIGNIFICANT CHANGE UP (ref 3.5–5.3)
POTASSIUM SERPL-SCNC: 3.5 MMOL/L — SIGNIFICANT CHANGE UP (ref 3.5–5.3)
RBC # BLD: 3.44 M/UL — LOW (ref 3.8–5.2)
RBC # BLD: 4.36 M/UL — SIGNIFICANT CHANGE UP (ref 3.8–5.2)
RBC # FLD: 16.5 % — HIGH (ref 10.3–14.5)
RBC # FLD: 17 % — HIGH (ref 10.3–14.5)
RH IG SCN BLD-IMP: POSITIVE — SIGNIFICANT CHANGE UP
SARS-COV-2 IGG+IGM SERPL QL IA: >250 U/ML — HIGH
SARS-COV-2 IGG+IGM SERPL QL IA: POSITIVE
SODIUM SERPL-SCNC: 134 MMOL/L — LOW (ref 135–145)
T PALLIDUM AB TITR SER: NEGATIVE — SIGNIFICANT CHANGE UP
WBC # BLD: 13.59 K/UL — HIGH (ref 3.8–10.5)
WBC # BLD: 20.79 K/UL — HIGH (ref 3.8–10.5)
WBC # FLD AUTO: 13.59 K/UL — HIGH (ref 3.8–10.5)
WBC # FLD AUTO: 20.79 K/UL — HIGH (ref 3.8–10.5)

## 2022-08-29 PROCEDURE — 59514 CESAREAN DELIVERY ONLY: CPT

## 2022-08-29 PROCEDURE — 59525 REMOVE UTERUS AFTER CESAREAN: CPT | Mod: 59

## 2022-08-29 PROCEDURE — 88302 TISSUE EXAM BY PATHOLOGIST: CPT | Mod: 26

## 2022-08-29 PROCEDURE — 88307 TISSUE EXAM BY PATHOLOGIST: CPT | Mod: 26

## 2022-08-29 PROCEDURE — 52005 CYSTO W/URTRL CATHJ: CPT

## 2022-08-29 DEVICE — URETERAL CATH OPEN END 5FR 70CM: Type: IMPLANTABLE DEVICE | Status: FUNCTIONAL

## 2022-08-29 DEVICE — LIGATING CLIPS WECK HORIZON MEDIUM (BLUE) 24: Type: IMPLANTABLE DEVICE | Status: FUNCTIONAL

## 2022-08-29 DEVICE — SURGICEL NU-KNIT 6 X 9": Type: IMPLANTABLE DEVICE | Status: FUNCTIONAL

## 2022-08-29 DEVICE — VISTASEAL FIBRIN HUMAN 10ML: Type: IMPLANTABLE DEVICE | Status: FUNCTIONAL

## 2022-08-29 DEVICE — LIGATING CLIPS WECK HORIZON LARGE (ORANGE) 24: Type: IMPLANTABLE DEVICE | Status: FUNCTIONAL

## 2022-08-29 DEVICE — SURGICEL FIBRILLAR 2 X 4": Type: IMPLANTABLE DEVICE | Status: FUNCTIONAL

## 2022-08-29 DEVICE — GUIDEWIRE SENSOR DUAL-FLEX NITINOL STRAIGHT .038" X 150CM: Type: IMPLANTABLE DEVICE | Status: FUNCTIONAL

## 2022-08-29 RX ORDER — SODIUM CHLORIDE 9 MG/ML
1000 INJECTION, SOLUTION INTRAVENOUS ONCE
Refills: 0 | Status: DISCONTINUED | OUTPATIENT
Start: 2022-08-29 | End: 2022-08-31

## 2022-08-29 RX ORDER — OXYTOCIN 10 UNIT/ML
333.33 VIAL (ML) INJECTION
Qty: 20 | Refills: 0 | Status: DISCONTINUED | OUTPATIENT
Start: 2022-08-29 | End: 2022-08-29

## 2022-08-29 RX ORDER — OXYCODONE HYDROCHLORIDE 5 MG/1
5 TABLET ORAL
Refills: 0 | Status: DISCONTINUED | OUTPATIENT
Start: 2022-08-29 | End: 2022-08-31

## 2022-08-29 RX ORDER — NALOXONE HYDROCHLORIDE 4 MG/.1ML
0.1 SPRAY NASAL
Refills: 0 | Status: DISCONTINUED | OUTPATIENT
Start: 2022-08-29 | End: 2022-08-31

## 2022-08-29 RX ORDER — ONDANSETRON 8 MG/1
4 TABLET, FILM COATED ORAL ONCE
Refills: 0 | Status: DISCONTINUED | OUTPATIENT
Start: 2022-08-29 | End: 2022-08-29

## 2022-08-29 RX ORDER — KETOROLAC TROMETHAMINE 30 MG/ML
30 SYRINGE (ML) INJECTION EVERY 6 HOURS
Refills: 0 | Status: DISCONTINUED | OUTPATIENT
Start: 2022-08-29 | End: 2022-08-30

## 2022-08-29 RX ORDER — ONDANSETRON 8 MG/1
4 TABLET, FILM COATED ORAL EVERY 6 HOURS
Refills: 0 | Status: DISCONTINUED | OUTPATIENT
Start: 2022-08-29 | End: 2022-08-31

## 2022-08-29 RX ORDER — SODIUM CHLORIDE 9 MG/ML
1000 INJECTION, SOLUTION INTRAVENOUS
Refills: 0 | Status: DISCONTINUED | OUTPATIENT
Start: 2022-08-29 | End: 2022-08-30

## 2022-08-29 RX ORDER — TETANUS TOXOID, REDUCED DIPHTHERIA TOXOID AND ACELLULAR PERTUSSIS VACCINE, ADSORBED 5; 2.5; 8; 8; 2.5 [IU]/.5ML; [IU]/.5ML; UG/.5ML; UG/.5ML; UG/.5ML
0.5 SUSPENSION INTRAMUSCULAR ONCE
Refills: 0 | Status: DISCONTINUED | OUTPATIENT
Start: 2022-08-29 | End: 2022-08-31

## 2022-08-29 RX ORDER — HYDROMORPHONE HYDROCHLORIDE 2 MG/ML
250 INJECTION INTRAMUSCULAR; INTRAVENOUS; SUBCUTANEOUS
Refills: 0 | Status: DISCONTINUED | OUTPATIENT
Start: 2022-08-29 | End: 2022-08-30

## 2022-08-29 RX ORDER — DEXAMETHASONE 0.5 MG/5ML
4 ELIXIR ORAL EVERY 6 HOURS
Refills: 0 | Status: DISCONTINUED | OUTPATIENT
Start: 2022-08-29 | End: 2022-08-31

## 2022-08-29 RX ORDER — ACETAMINOPHEN 500 MG
975 TABLET ORAL
Refills: 0 | Status: DISCONTINUED | OUTPATIENT
Start: 2022-08-29 | End: 2022-08-31

## 2022-08-29 RX ORDER — SODIUM CHLORIDE 9 MG/ML
1000 INJECTION, SOLUTION INTRAVENOUS ONCE
Refills: 0 | Status: DISCONTINUED | OUTPATIENT
Start: 2022-08-29 | End: 2022-08-30

## 2022-08-29 RX ORDER — SIMETHICONE 80 MG/1
80 TABLET, CHEWABLE ORAL EVERY 4 HOURS
Refills: 0 | Status: DISCONTINUED | OUTPATIENT
Start: 2022-08-29 | End: 2022-08-31

## 2022-08-29 RX ORDER — IBUPROFEN 200 MG
600 TABLET ORAL EVERY 6 HOURS
Refills: 0 | Status: DISCONTINUED | OUTPATIENT
Start: 2022-08-29 | End: 2022-08-31

## 2022-08-29 RX ORDER — DIPHENHYDRAMINE HCL 50 MG
25 CAPSULE ORAL EVERY 6 HOURS
Refills: 0 | Status: DISCONTINUED | OUTPATIENT
Start: 2022-08-29 | End: 2022-08-31

## 2022-08-29 RX ORDER — LANOLIN
1 OINTMENT (GRAM) TOPICAL EVERY 6 HOURS
Refills: 0 | Status: DISCONTINUED | OUTPATIENT
Start: 2022-08-29 | End: 2022-08-31

## 2022-08-29 RX ORDER — CITRIC ACID/SODIUM CITRATE 300-500 MG
30 SOLUTION, ORAL ORAL ONCE
Refills: 0 | Status: DISCONTINUED | OUTPATIENT
Start: 2022-08-29 | End: 2022-08-30

## 2022-08-29 RX ORDER — CITRIC ACID/SODIUM CITRATE 300-500 MG
30 SOLUTION, ORAL ORAL ONCE
Refills: 0 | Status: COMPLETED | OUTPATIENT
Start: 2022-08-29 | End: 2022-08-29

## 2022-08-29 RX ORDER — HYDROMORPHONE HYDROCHLORIDE 2 MG/ML
0.5 INJECTION INTRAMUSCULAR; INTRAVENOUS; SUBCUTANEOUS
Refills: 0 | Status: DISCONTINUED | OUTPATIENT
Start: 2022-08-29 | End: 2022-08-29

## 2022-08-29 RX ORDER — MAGNESIUM HYDROXIDE 400 MG/1
30 TABLET, CHEWABLE ORAL
Refills: 0 | Status: DISCONTINUED | OUTPATIENT
Start: 2022-08-29 | End: 2022-08-31

## 2022-08-29 RX ORDER — MORPHINE SULFATE 50 MG/1
0.1 CAPSULE, EXTENDED RELEASE ORAL ONCE
Refills: 0 | Status: DISCONTINUED | OUTPATIENT
Start: 2022-08-29 | End: 2022-08-30

## 2022-08-29 RX ORDER — SODIUM CHLORIDE 9 MG/ML
500 INJECTION, SOLUTION INTRAVENOUS ONCE
Refills: 0 | Status: DISCONTINUED | OUTPATIENT
Start: 2022-08-29 | End: 2022-08-31

## 2022-08-29 RX ORDER — SODIUM CHLORIDE 9 MG/ML
1000 INJECTION, SOLUTION INTRAVENOUS ONCE
Refills: 0 | Status: DISCONTINUED | OUTPATIENT
Start: 2022-08-29 | End: 2022-08-29

## 2022-08-29 RX ORDER — OXYCODONE HYDROCHLORIDE 5 MG/1
5 TABLET ORAL ONCE
Refills: 0 | Status: DISCONTINUED | OUTPATIENT
Start: 2022-08-29 | End: 2022-08-31

## 2022-08-29 RX ORDER — HEPARIN SODIUM 5000 [USP'U]/ML
5000 INJECTION INTRAVENOUS; SUBCUTANEOUS EVERY 12 HOURS
Refills: 0 | Status: DISCONTINUED | OUTPATIENT
Start: 2022-08-29 | End: 2022-08-31

## 2022-08-29 RX ORDER — SODIUM CHLORIDE 9 MG/ML
1000 INJECTION, SOLUTION INTRAVENOUS
Refills: 0 | Status: DISCONTINUED | OUTPATIENT
Start: 2022-08-29 | End: 2022-08-29

## 2022-08-29 RX ADMIN — SODIUM CHLORIDE 125 MILLILITER(S): 9 INJECTION, SOLUTION INTRAVENOUS at 22:12

## 2022-08-29 RX ADMIN — HEPARIN SODIUM 5000 UNIT(S): 5000 INJECTION INTRAVENOUS; SUBCUTANEOUS at 19:27

## 2022-08-29 RX ADMIN — Medication 30 MILLIGRAM(S): at 19:26

## 2022-08-29 RX ADMIN — Medication 30 MILLILITER(S): at 08:19

## 2022-08-29 RX ADMIN — HYDROMORPHONE HYDROCHLORIDE 250 MILLILITER(S): 2 INJECTION INTRAMUSCULAR; INTRAVENOUS; SUBCUTANEOUS at 19:25

## 2022-08-29 RX ADMIN — Medication 975 MILLIGRAM(S): at 21:54

## 2022-08-29 RX ADMIN — HYDROMORPHONE HYDROCHLORIDE 250 MILLILITER(S): 2 INJECTION INTRAMUSCULAR; INTRAVENOUS; SUBCUTANEOUS at 12:37

## 2022-08-29 RX ADMIN — HYDROMORPHONE HYDROCHLORIDE 250 MILLILITER(S): 2 INJECTION INTRAMUSCULAR; INTRAVENOUS; SUBCUTANEOUS at 17:05

## 2022-08-29 RX ADMIN — SODIUM CHLORIDE 125 MILLILITER(S): 9 INJECTION, SOLUTION INTRAVENOUS at 12:47

## 2022-08-29 NOTE — OB PROVIDER H&P - HISTORY OF PRESENT ILLNESS
36yo  at 34w1d with known placenta previa and placenta accreta spectrum p/f scheduled rCS/Hysterectomy with Urologic Ureteral Catheter placement. -LOF, -VB, -Ctx, +FM.    Antepartum:  (a) Late transfer of care to Dr. Cruz at 27weeks GA  (b) Pregnancy c/b placenta previa and placenta accreta spectrum  MRI 22: Markedly abnormal placenta compatible with placenta accreta spectrum abnormality. Discontinuous bladder wall concerning for percreta. Complete placenta previa.  Urology consult Dr. Guillory 22: plan for cystoscopy and ureteral catheter placement at time of scheduled c/s  (c) Fetal congenital heart disease; fetal echo 22: TAPVR, cannot r/o ASD, co-ventricular VSD. Peds cards Dr. Castanon consulted  (d) s/p BMZ -) for sentinel bleed    GBS unk  EFW 2100g on sonogram yesterday  with Dr. Cruz    ObHx:  G1 2019 MAB D&C at 11wks  G2  primary LTCS for failed rrIOL at 40wks, 3290g  G3  scheduled repeat LTCS, breech at 39wks, 3090g  G4 current  GynHx: denies   PMH: denies  PSH: c/s x2, D&C, R knee surgery for osteochondritis dissecans age 16yo  NKDA  Meds: synthroid 50mcg qd, PNV, PO iron  Social: denies T/E/D

## 2022-08-29 NOTE — OB PROVIDER DELIVERY SUMMARY - NSSELHIDDEN_OBGYN_ALL_OB_FT
[NS_DeliveryAttending1_OBGYN_ALL_OB_FT:[email or web address]],[NS_DeliveryAssist1_OBGYN_ALL_OB_FT:[email or web address]=]

## 2022-08-29 NOTE — BRIEF OPERATIVE NOTE - ANESTHESIOLOGIST NAME
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 Today it is swollen. Eye does not have pain but is uncomfortable. Pt concerned.  Vision is \"not perfect but better than it was.\"  Brittani Bajwa COA 2:37 PM May 3, 2017                "
Dr Smith

## 2022-08-29 NOTE — OB RN DELIVERY SUMMARY - NSCSDELIVAASS_OBGYN_ALL_OB
[facility] [place] [address] [place] [address]  Phone: [phone]    May 17, 2021        Don Samson  [address]  [place] [address]          To whom it may concern:    RE: Don CLARKE [place]    Patient was seen and treated today at our clinic and missed work.    Please contact me for questions or concerns.      Sincerely,        Allison Cannon   N/A

## 2022-08-29 NOTE — OB RN PATIENT PROFILE - NS_OBGYNHISTORY_OBGYN_ALL_OB_FT
placenta accreta with current pregnancy  h/o  section x2 in  &  placenta accreta with current pregnancy. discontinuous bladder concern for percreta.  h/o  section x2 in 2020 & . SAB with D&C 2019.

## 2022-08-29 NOTE — OB PROVIDER H&P - ASSESSMENT
38yo  @34w1d p/f scheduled rCS + Hysterectomy for placenta accreta spectrum   - Urology paged for catheters  - admit to L&D  - Reglan/Pepcid/Bicitra  - Routine Labs  - 4uPRBC on hold  - FHT/TOCO  - Anesthesia Consult    dw Dr.Nanda Abelino Bhatia PGY4

## 2022-08-29 NOTE — PROGRESS NOTE ADULT - SUBJECTIVE AND OBJECTIVE BOX
Post-Operative Check Note    **Incomplete**     S: Patient feeling well. Denies pain, has not hit PCEA pump post-op. Has been tolerating clears without nausea/vomiting.     O: T 98.2F HR 85 /64 RR 16 SpO2 100%   Gen: well-appearing, sitting up comfortably   Abd: soft, nondistended, vertical bandage clean  Urinary phoenix blood-tinged (clear following exit from OR)     A/P: POD0 s/p  hysterectomy     -Stable for transfer to floor  -ADAT  -phoenix in for 24 hours      Post-Operative Check Note    S: Patient feeling well. Denies pain, has not hit PCEA pump post-op. Has been tolerating clears without nausea/vomiting.     O: T 98.2F HR 85 /64 RR 16 SpO2 100%   Gen: well-appearing, sitting up comfortably   Abd: soft, nondistended, vertical bandage clean  Urinary phoenix blood-tinged (following stent removal, clear following exit from OR)     Results (PACU):   H/H 9.5/29.6   Plts 268  WBC 18.7

## 2022-08-29 NOTE — OB PROVIDER DELIVERY SUMMARY - NSPROVIDERDELIVERYNOTE_OBGYN_ALL_OB_FT
HTCS and Supracervical Hysterectomy w/ Bilateral Salpingectomy for placenta accreta through midline vertical skin incision. Ureteral catheters placed and removed by Urology ()  Hysterotomy closed in 1 layer using vicryl prior to removal  Vistaseal and nu-knit placed over cuff  Methylene blue at conclusion of case showed no extravasation through bladder.  Serosal injury over bladder repaired with 2-0vicryl  Grossly normal uterus, tubes, and ovaries  Abdomen closed in standard fashion  EBL: 800   IVF: 3200+250Alb    UOP: 1200 HTCS and Supracervical Hysterectomy w/ Bilateral Salpingectomy for placenta accreta through midline vertical skin incision. Ureteral catheters placed and removed by Urology ()  Hysterotomy closed in 1 layer using vicryl prior to removal  Vistaseal and nu-knit placed over cuff  Methylene blue at conclusion of case showed no extravasation through bladder.  Serosal injury over bladder repaired with 2-0vicryl  Grossly normal uterus, tubes, and ovaries  Abdomen closed in standard fashion  EBL: 800   IVF: 3200+250Alb    UOP: 1200  Dictation#[id number]

## 2022-08-29 NOTE — OB RN DELIVERY SUMMARY - NSSELHIDDEN_OBGYN_ALL_OB_FT
[[email or web address]_OBGYN_ALL_OB_FT:[id number]],[NS_DeliveryAssist1_OBGYN_ALL_OB_FT:[id number]]

## 2022-08-29 NOTE — OB RN DELIVERY SUMMARY - NS_ADDITIONALPERSONNEL_OBGYN_ALL_OB_FT
Dr. Lele Guillory (urology), Dr. Morley (fellow), DIPIKA Sarah (Peds CT), Dr. Grant (peds GI? fellow)

## 2022-08-29 NOTE — OB RN DELIVERY SUMMARY - NS_SEPSISRSKCALC_OBGYN_ALL_OB_FT
EOS calculated successfully. EOS Risk Factor: 0.5/1000 live births ([facility] national incidence); GA=34w1d; Temp=98.24; ROM=0.017; GBS='Unknown'; Antibiotics='No antibiotics or any antibiotics < 2 hrs prior to birth'

## 2022-08-29 NOTE — BRIEF OPERATIVE NOTE - NSICDXBRIEFPROCEDURE_GEN_ALL_CORE_FT
PROCEDURES:  Cystoscopy, with ureteral catheter insertion 29-Aug-2022 13:19:06  Damien Morley  
PROCEDURES:   hysterectomy 29-Aug-2022 12:10:01  Robert Castrejon  Bilateral salpingectomy 29-Aug-2022 12:10:42  Robert Castrejon

## 2022-08-29 NOTE — BRIEF OPERATIVE NOTE - OPERATION/FINDINGS
insertion of ureteral catheters
Pre-operative ultrasound: fetus cephalic, complete placenta previa with lacunae and irregular bladder interface   Bilateral ureteral stents placed pre-operatively by anesthesia   Intra-op: Bulging placenta with placenta accreta noted with dense adhesions of lower uterine segment to bladder serosa. Normal bilateral tubes and ovaries.   High transverse uterine incision, infant delivered using breech maneuvers, no traction was applied to umbilical cord, hysterotomy closed in whip stitch single layer  During anterior dissection, about 3cm of the posterior bladder was deserosalized and was repaired with running vicryl suture. No full thickness injury occurred and bladder was in tact following instillation of methylene blue  Most of cervix was removed during hysterectomy; however, palpable portion of cervix remained following case.   Multiple clips placed along the vesicouterine reflection for hemostasis. Vistaseal and nu-knit gauze placed over vaginal cuff following case. Good hemostasis noted

## 2022-08-29 NOTE — OB RN PATIENT PROFILE - NSMATERNALFETALCONCERNS_OBGYN_ALL_OB_FT
MATERNAL FETAL ALERT  7/6/22  - Pelvic MRI - markedly abnormal placenta with placenta ACCRETA spectrum.  Discontinuous bladder wall concerning for PERCRETA. -Hermelinda Mkai, RNC  S/P PLACENTAL MRI   UROLOGY CONSULT WITH DR Pastora HARRIS OR CASE for 8/29/2022  ABNORMAL FETAL ECHO -TAPVR  alert NICU ASAP   Katlin Saldana RN 7/26/2022  alert blood bank , anesthesia and NICU

## 2022-08-29 NOTE — OB PROVIDER H&P - NSHPPHYSICALEXAM_GEN_ALL_CORE
ICU Vital Signs Last 24 Hrs  T(C): 36.8 (29 Aug 2022 06:42), Max: 36.8 (29 Aug 2022 06:07)  T(F): 98.2 (29 Aug 2022 06:42), Max: 98.24 (29 Aug 2022 06:07)  HR: 81 (29 Aug 2022 06:42) (81 - 81)  BP: 107/52 (29 Aug 2022 06:42) (107/52 - 107/52)  RR: 16 (29 Aug 2022 06:42) (16 - 16)    CV:RRR  Pulm: CTA bl  VE: deferred  FHT:  135, mod octavia, + accels, - decels OYJANA  TOCO: none

## 2022-08-29 NOTE — OB RN PATIENT PROFILE - CURRENT PREGNANCY COMPLICATIONS, OB PROFILE
Abnormal Fetal Surveillance/Placenta Accreta fetal alert: fetal congenital heart disease, TAPVR, cannot r/o ASD, coventricular VSD/Abnormal Fetal Surveillance/Placenta Accreta

## 2022-08-29 NOTE — OB NEONATOLOGY/PEDIATRICIAN DELIVERY SUMMARY - NSPEDSNEONOTESA_OBGYN_ALL_OB_FT
Called to delivery by Dr. Cruz for scheduled  c/s for maternal indications and fetal congenital heart disease. 34.1 week male born to a 36 year old  (SABx1), A+, GBS unknown (no rupture/no labor), COVID negative , PNL unremarkable mother. Maternal medical/surgical history significant for right knee surgery, anemia on Fe supplementation and hypothyroid on synthroid 50 mcg daily. Previous c/s x2. Pregnancy history significant for placenta previa and placenta accreta spectrum that is concerning for placenta percreta. Fetal ECHO significant for infradiaphragmatic TAPVR, coventricular VSD, PFO vs. ASD and borderline RA enlargement. Betamethasone given -. Male infant born via scheduled repeat c/s in main OR due to maternal complications. AROM at delivery with .... fluid. APGARs at 1 and 5 minutes respectively. NICU attending JULIANA Delatorre in attendance. Called to delivery by Dr. Cruz for scheduled  c/s for maternal indications and fetal congenital heart disease. 34.1 week male born to a 36 year old  (SABx1), A+, GBS unknown (no rupture/no labor), COVID negative , PNL unremarkable mother. Maternal medical/surgical history significant for right knee surgery, anemia on Fe supplementation and hypothyroid on synthroid 50 mcg daily. Previous c/s x2. Pregnancy history significant for placenta previa and placenta accreta spectrum that is concerning for placenta percreta. Fetal ECHO significant for infradiaphragmatic TAPVR, coventricular VSD, PFO vs. ASD and borderline RA enlargement. Betamethasone given -. Male infant born via scheduled repeat c/s in main OR due to maternal complications. AROM at delivery with clear fluid. Male infant born with spontaneous cry. Delayed cord clamping x30 seconds. W/D/S/S. Cyanosis with poor perfusion noted. Oxygen saturations initially in the 50s. HR always >100. Intubated by 2 MOL with 3.0 ETT secured at 7.5 cm. Positive color change on CO2 detector and equal bilateral breath sounds. Given PPV via neopuff with highest settings 25/5, FiO2 35%. Oxygen saturations remained in the 50s and 60s. 2 PIVs placed. Due to poor perfusion and BP means <25 given 10 ml/kg normal saline bolus and 10 ml/kg PRBCs with slight improvement in capillary refill noted. APGARs 8/8 at 1 and 5 minutes respectively. Due to persistent cyanosis and poor perfusion, infant transported via neopuff with pressures 20/5, 35% FiO2 to OR for ECMO cannulation with CT surgery team. O2 saturations remained in 60s during transport. Of note on physical exam, umbilical cord appeared bulbous. Cord was cut down in small increments and meconium noted in umbilical cord. Pediatric surgery is aware. 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 They will continue to follow. NICU attending JULIANA Delatorre in attendance. Called to delivery by Dr. Cruz for scheduled  c/s for maternal indications and fetal congenital heart disease. 34.1 week male born to a 36 year old  (SABx1), A+, GBS unknown (no rupture/no labor), COVID negative , PNL unremarkable mother. Maternal medical/surgical history significant for right knee surgery, anemia on Fe supplementation and hypothyroid on synthroid 50 mcg daily. Previous c/s x2. Pregnancy history significant for placenta previa and placenta accreta spectrum that is concerning for placenta percreta. Fetal ECHO significant for infradiaphragmatic TAPVR, coventricular VSD, PFO vs. ASD and borderline RA enlargement. Betamethasone given -. Male infant born via scheduled repeat c/s in main OR due to maternal complications. AROM at delivery with clear fluid. Male infant born with spontaneous cry. Delayed cord clamping x30 seconds. W/D/S/S. Cyanosis with poor perfusion noted. Oxygen saturations initially in the 50s. HR always >100. Intubated by 2 MOL with 3.0 ETT secured at 7.5 cm. Positive color change on CO2 detector and equal bilateral breath sounds. Given PPV via neopuff with highest settings 25/5, FiO2 35%. Oxygen saturations remained in the 50s and 60s. 2 PIVs placed. Due to poor perfusion and BP means <25 given 10 ml/kg normal saline bolus and 10 ml/kg PRBCs with slight improvement in capillary refill noted. APGARs 8/8 at 1 and 5 minutes respectively. Due to persistent cyanosis and poor perfusion, infant transported via neopuff with pressures 20/5, 35% FiO2 to OR for ECMO cannulation with CT surgery team. O2 saturations remained in 60s during transport. Of note on physical exam, umbilical cord appeared bulbous. Cord was cut down in small increments and meconium noted in umbilical cord. 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 They will continue to follow. NICU attending JULIANA Delatorre in attendance.

## 2022-08-29 NOTE — PROGRESS NOTE ADULT - ASSESSMENT
36 yo now  POD0 s/p  hysterectomy, bilateral salpingectomy for placenta accreta complicated by bladder de-serosalization s/p repair. , no transfusion given intrapartum     -Stable for transfer to floor  -ADAT, continue ERAS protocol for pain control   -Monitor urine output and color. Jose in for 24 hours   -H/H tomorrow morning   -Leave bandage on for at least 24 hours postpartum     Patient seen with Dr. Nancy Jones Comfort PGY-5

## 2022-08-29 NOTE — OB NEONATOLOGY/PEDIATRICIAN DELIVERY SUMMARY - NSPHYSICALEXAMDETAILSA_OBGYN_ALL_OB_FT
bulbous umbilical cord with bowel present in cord, +stool return, pediatric surgery aware bulbous appearing umbilical cord with bowel present in cord, +stool return, pediatric surgery aware

## 2022-08-29 NOTE — BRIEF OPERATIVE NOTE - NSICDXBRIEFPREOP_GEN_ALL_CORE_FT
PRE-OP DIAGNOSIS:  Placenta accreta 29-Aug-2022 12:11:01  Robert Castrejon  Placenta previa 29-Aug-2022 12:11:13  Robert Castrejon  History of 2  sections 29-Aug-2022 12:11:33  Robert Castrejon

## 2022-08-30 LAB
APTT BLD: 30.2 SEC — SIGNIFICANT CHANGE UP (ref 27–36.3)
BASOPHILS # BLD AUTO: 0.02 K/UL — SIGNIFICANT CHANGE UP (ref 0–0.2)
BASOPHILS # BLD AUTO: 0.04 K/UL — SIGNIFICANT CHANGE UP (ref 0–0.2)
BASOPHILS NFR BLD AUTO: 0.2 % — SIGNIFICANT CHANGE UP (ref 0–2)
BASOPHILS NFR BLD AUTO: 0.3 % — SIGNIFICANT CHANGE UP (ref 0–2)
EOSINOPHIL # BLD AUTO: 0.02 K/UL — SIGNIFICANT CHANGE UP (ref 0–0.5)
EOSINOPHIL # BLD AUTO: 0.03 K/UL — SIGNIFICANT CHANGE UP (ref 0–0.5)
EOSINOPHIL NFR BLD AUTO: 0.2 % — SIGNIFICANT CHANGE UP (ref 0–6)
EOSINOPHIL NFR BLD AUTO: 0.3 % — SIGNIFICANT CHANGE UP (ref 0–6)
HCT VFR BLD CALC: 18.8 % — CRITICAL LOW (ref 34.5–45)
HCT VFR BLD CALC: 31.4 % — LOW (ref 34.5–45)
HGB BLD-MCNC: 10 G/DL — LOW (ref 11.5–15.5)
HGB BLD-MCNC: 5.8 G/DL — CRITICAL LOW (ref 11.5–15.5)
IANC: 5.57 K/UL — SIGNIFICANT CHANGE UP (ref 1.8–7.4)
IANC: 7.93 K/UL — HIGH (ref 1.8–7.4)
IMM GRANULOCYTES NFR BLD AUTO: 0.6 % — SIGNIFICANT CHANGE UP (ref 0–1.5)
IMM GRANULOCYTES NFR BLD AUTO: 0.9 % — SIGNIFICANT CHANGE UP (ref 0–1.5)
INR BLD: 1.14 RATIO — SIGNIFICANT CHANGE UP (ref 0.88–1.16)
LYMPHOCYTES # BLD AUTO: 1.71 K/UL — SIGNIFICANT CHANGE UP (ref 1–3.3)
LYMPHOCYTES # BLD AUTO: 2.83 K/UL — SIGNIFICANT CHANGE UP (ref 1–3.3)
LYMPHOCYTES # BLD AUTO: 20.8 % — SIGNIFICANT CHANGE UP (ref 13–44)
LYMPHOCYTES # BLD AUTO: 23.9 % — SIGNIFICANT CHANGE UP (ref 13–44)
MCHC RBC-ENTMCNC: 27.8 PG — SIGNIFICANT CHANGE UP (ref 27–34)
MCHC RBC-ENTMCNC: 28.3 PG — SIGNIFICANT CHANGE UP (ref 27–34)
MCHC RBC-ENTMCNC: 30.9 GM/DL — LOW (ref 32–36)
MCHC RBC-ENTMCNC: 31.8 GM/DL — LOW (ref 32–36)
MCV RBC AUTO: 89 FL — SIGNIFICANT CHANGE UP (ref 80–100)
MCV RBC AUTO: 90 FL — SIGNIFICANT CHANGE UP (ref 80–100)
MONOCYTES # BLD AUTO: 0.83 K/UL — SIGNIFICANT CHANGE UP (ref 0–0.9)
MONOCYTES # BLD AUTO: 0.94 K/UL — HIGH (ref 0–0.9)
MONOCYTES NFR BLD AUTO: 10.1 % — SIGNIFICANT CHANGE UP (ref 2–14)
MONOCYTES NFR BLD AUTO: 7.9 % — SIGNIFICANT CHANGE UP (ref 2–14)
NEUTROPHILS # BLD AUTO: 5.57 K/UL — SIGNIFICANT CHANGE UP (ref 1.8–7.4)
NEUTROPHILS # BLD AUTO: 7.93 K/UL — HIGH (ref 1.8–7.4)
NEUTROPHILS NFR BLD AUTO: 67 % — SIGNIFICANT CHANGE UP (ref 43–77)
NEUTROPHILS NFR BLD AUTO: 67.8 % — SIGNIFICANT CHANGE UP (ref 43–77)
NRBC # BLD: 0 /100 WBCS — SIGNIFICANT CHANGE UP (ref 0–0)
NRBC # FLD: 0 K/UL — SIGNIFICANT CHANGE UP (ref 0–0)
PLATELET # BLD AUTO: 162 K/UL — SIGNIFICANT CHANGE UP (ref 150–400)
PLATELET # BLD AUTO: 278 K/UL — SIGNIFICANT CHANGE UP (ref 150–400)
PROTHROM AB SERPL-ACNC: 13.2 SEC — SIGNIFICANT CHANGE UP (ref 10.5–13.4)
RBC # BLD: 2.09 M/UL — LOW (ref 3.8–5.2)
RBC # BLD: 3.53 M/UL — LOW (ref 3.8–5.2)
RBC # FLD: 16.5 % — HIGH (ref 10.3–14.5)
RBC # FLD: 16.6 % — HIGH (ref 10.3–14.5)
WBC # BLD: 11.84 K/UL — HIGH (ref 3.8–10.5)
WBC # BLD: 8.22 K/UL — SIGNIFICANT CHANGE UP (ref 3.8–10.5)
WBC # FLD AUTO: 11.84 K/UL — HIGH (ref 3.8–10.5)
WBC # FLD AUTO: 8.22 K/UL — SIGNIFICANT CHANGE UP (ref 3.8–10.5)

## 2022-08-30 RX ORDER — FERROUS SULFATE 325(65) MG
325 TABLET ORAL DAILY
Refills: 0 | Status: DISCONTINUED | OUTPATIENT
Start: 2022-08-30 | End: 2022-08-31

## 2022-08-30 RX ADMIN — Medication 975 MILLIGRAM(S): at 03:45

## 2022-08-30 RX ADMIN — Medication 30 MILLIGRAM(S): at 09:46

## 2022-08-30 RX ADMIN — HEPARIN SODIUM 5000 UNIT(S): 5000 INJECTION INTRAVENOUS; SUBCUTANEOUS at 06:19

## 2022-08-30 RX ADMIN — Medication 325 MILLIGRAM(S): at 18:36

## 2022-08-30 RX ADMIN — Medication 975 MILLIGRAM(S): at 21:45

## 2022-08-30 RX ADMIN — Medication 30 MILLIGRAM(S): at 16:17

## 2022-08-30 RX ADMIN — Medication 1 TABLET(S): at 18:36

## 2022-08-30 RX ADMIN — Medication 975 MILLIGRAM(S): at 09:46

## 2022-08-30 RX ADMIN — Medication 975 MILLIGRAM(S): at 16:17

## 2022-08-30 RX ADMIN — HEPARIN SODIUM 5000 UNIT(S): 5000 INJECTION INTRAVENOUS; SUBCUTANEOUS at 18:36

## 2022-08-30 RX ADMIN — Medication 30 MILLIGRAM(S): at 01:23

## 2022-08-30 RX ADMIN — Medication 975 MILLIGRAM(S): at 22:30

## 2022-08-30 RX ADMIN — SODIUM CHLORIDE 125 MILLILITER(S): 9 INJECTION, SOLUTION INTRAVENOUS at 10:59

## 2022-08-30 RX ADMIN — HYDROMORPHONE HYDROCHLORIDE 250 MILLILITER(S): 2 INJECTION INTRAMUSCULAR; INTRAVENOUS; SUBCUTANEOUS at 08:03

## 2022-08-30 NOTE — PROGRESS NOTE ADULT - ATTENDING COMMENTS
Patient seen and examined  Doing well POD 1   Afebrile, vital signs stable and abdomen soft and non tender  Repeat cbc was normal   Will continue post op care

## 2022-08-30 NOTE — PROGRESS NOTE ADULT - SUBJECTIVE AND OBJECTIVE BOX
Patient seen and examined at bedside, no acute overnight events. No acute complaints, pain well controlled. Patient is ambulating and tolerating regular diet. Has not yet passed flatus. Denies CP, SOB, N/V, HA, blurred vision, epigastric pain.    Vital Signs Last 24 Hours  T(C): 36.9 (08-30-22 @ 10:36), Max: 36.9 (08-29-22 @ 16:43)  HR: 66 (08-30-22 @ 10:36) (64 - 92)  BP: 115/53 (08-30-22 @ 10:36) (94/50 - 115/64)  RR: 18 (08-30-22 @ 10:36) (12 - 19)  SpO2: 100% (08-30-22 @ 10:36) (97% - 100%)    I&O's Summary    29 Aug 2022 07:01  -  30 Aug 2022 07:00  --------------------------------------------------------  IN: 5590 mL / OUT: 6485 mL / NET: -895 mL    30 Aug 2022 07:01  -  30 Aug 2022 13:37  --------------------------------------------------------  IN: 740 mL / OUT: 750 mL / NET: -10 mL    Physical Exam:  General: NAD  Abdomen: Soft, expected tenderness, non-distended, fundus firm  Incision: Midline vertical incision CDI  Pelvic: Lochia wnl    Labs:    Blood Type: A Positive  Antibody Screen: Negative  RPR: Negative               10.0   11.84 )-----------( 278      ( 08-30 @ 09:04 )             31.4                5.8    8.22  )-----------( 162      ( 08-30 @ 05:10 )             18.8                9.5    20.79 )-----------( 268      ( 08-29 @ 13:50 )             29.6         MEDICATIONS  (STANDING):  acetaminophen     Tablet .. 975 milliGRAM(s) Oral <User Schedule>  citric acid/sodium citrate Solution 30 milliLiter(s) Oral once  citric acid/sodium citrate Solution 30 milliLiter(s) Oral once  diphtheria/tetanus/pertussis (acellular) Vaccine (ADAcel) 0.5 milliLiter(s) IntraMuscular once  heparin   Injectable 5000 Unit(s) SubCutaneous every 12 hours  ibuprofen  Tablet. 600 milliGRAM(s) Oral every 6 hours  ketorolac   Injectable 30 milliGRAM(s) IV Push every 6 hours  lactated ringers Bolus 500 milliLiter(s) IV Bolus once  lactated ringers Bolus 1000 milliLiter(s) IV Bolus once  lactated ringers Bolus 1000 milliLiter(s) IV Bolus once  lactated ringers Bolus 1000 milliLiter(s) IV Bolus once  lactated ringers. 1000 milliLiter(s) (125 mL/Hr) IV Continuous <Continuous>  lactated ringers. 1000 milliLiter(s) (125 mL/Hr) IV Continuous <Continuous>  lactated ringers. 1000 milliLiter(s) (75 mL/Hr) IV Continuous <Continuous>  lactated ringers. 1000 milliLiter(s) (125 mL/Hr) IV Continuous <Continuous>    MEDICATIONS  (PRN):  dexAMETHasone  Injectable 4 milliGRAM(s) IV Push every 6 hours PRN Nausea  diphenhydrAMINE 25 milliGRAM(s) Oral every 6 hours PRN Pruritus  lanolin Ointment 1 Application(s) Topical every 6 hours PRN Sore Nipples  magnesium hydroxide Suspension 30 milliLiter(s) Oral two times a day PRN Constipation  naloxone Injectable 0.1 milliGRAM(s) IV Push every 3 minutes PRN For ANY of the following changes in patient status:  A. Breaths Per Minute LESS THAN 10, B. Oxygen saturation LESS THAN 90%, C. Sedation score of 6 for Stop After: 4 Times  naloxone Injectable 0.1 milliGRAM(s) IV Push every 3 minutes PRN For ANY of the following changes in patient status:  A. RR LESS THAN 10 breaths per minute, B. Oxygen saturation LESS THAN 90%, C. Sedation score of 6  ondansetron Injectable 4 milliGRAM(s) IV Push every 6 hours PRN Nausea  ondansetron Injectable 4 milliGRAM(s) IV Push every 6 hours PRN Nausea  oxyCODONE    IR 5 milliGRAM(s) Oral every 3 hours PRN Moderate to Severe Pain (4-10)  oxyCODONE    IR 5 milliGRAM(s) Oral once PRN Moderate to Severe Pain (4-10)  simethicone 80 milliGRAM(s) Chew every 4 hours PRN Gas

## 2022-08-30 NOTE — ANESTHESIA FOLLOW-UP NOTE - NSRECOMMENDFT_GEN_ALL_CORE
Continue to monitor progress now that the epidural has been removed. Her leg weakness should improve in the next few hours.

## 2022-08-30 NOTE — ANESTHESIA FOLLOW-UP NOTE - NSEVALATIONFT_GEN_ALL_CORE
She has some right sided quadricep weakness likely due to the continued epidural infusion. Otherwise no complications with neuroaxial anesthesia.

## 2022-08-30 NOTE — PATIENT PROFILE ADULT - FALL HARM RISK - HARM RISK INTERVENTIONS

## 2022-08-30 NOTE — PROGRESS NOTE ADULT - ASSESSMENT
Pt is a 36yo  POD#1 from rC/S, SHAMEKA, BS for placenta accreta ( Pt is a 38yo  POD#1 from rC/S, SHAMEKA, BS for placenta accreta () doing well postoperatively.    - Continue with po analgesia  - Increase ambulation  - Continue regular diet  - d/c IV fluids  - AM CBC with inappropriate drop, repeat CBC stable compared to postop labs  - HSQ for DVT ppx    Neftaly, PGY3   Pt is a 38yo  POD#1 from rC/S, SHAMEKA, BS for placenta accreta () doing well postoperatively.    - Continue with po analgesia  - Increase ambulation  - Continue regular diet  - d/c IV fluids  - AM CBC with inappropriate drop, repeat CBC stable compared to postop labs  - HSQ for DVT ppx    Neftaly, PGY3    -----------    MFM Fellow Attestation     36 yo now  POD1 s/p  hysterectomy, bilateral salpingectomy for placenta accreta complicated by bladder de-serosalization s/p repair. , no transfusion given intrapartum. Patient is doing well post-operatively, PCEA discontinued today by anesthesia. She has been advanced to a regular diet. Urinary phoenix just removed, patient pending void trial. Patient's H/H were low on routine labs performed this morning however she had no signs/symptoms of anemia; repeated H/H was within normal limits. Her midline vertical incision is well-healing and her pain has been overall well-controlled. Patient's infant is in NICU and will need surgery today.     -ADAT, continue ERAS protocol for pain control   -Follow up void trial   -Repeat routine post-operative labs tomorrow morning; if stable, can discontinue labs thereafter   -SQ heparin for VTE prophylaxis   -Patient counseled that she should still undergo Pap testing given likely portion of cervix remaining following hysterectomy   -Dispo: continue routine post-operative care, anticipate discharge on POD3     Patient seen with Dr. Nancy Castrejon PGY-5

## 2022-08-30 NOTE — PROGRESS NOTE ADULT - SUBJECTIVE AND OBJECTIVE BOX
Anesthesia Pain Management Service- Attending Addendum    SUBJECTIVE: Pt doing well with PCEA without problems reported.    Therapy:	  [ ] IV PCA	   [ X] Epidural           [ ] s/p Spinal Opoid              [ ] Postpartum infusion	  [ ] Patient controlled regional anesthesia (PCRA)    [ ] prn Analgesics    Allergies    No Known Allergies    Intolerances      MEDICATIONS  (STANDING):  acetaminophen     Tablet .. 975 milliGRAM(s) Oral <User Schedule>  citric acid/sodium citrate Solution 30 milliLiter(s) Oral once  citric acid/sodium citrate Solution 30 milliLiter(s) Oral once  diphtheria/tetanus/pertussis (acellular) Vaccine (ADAcel) 0.5 milliLiter(s) IntraMuscular once  heparin   Injectable 5000 Unit(s) SubCutaneous every 12 hours  ibuprofen  Tablet. 600 milliGRAM(s) Oral every 6 hours  ketorolac   Injectable 30 milliGRAM(s) IV Push every 6 hours  lactated ringers Bolus 500 milliLiter(s) IV Bolus once  lactated ringers Bolus 1000 milliLiter(s) IV Bolus once  lactated ringers Bolus 1000 milliLiter(s) IV Bolus once  lactated ringers Bolus 1000 milliLiter(s) IV Bolus once  lactated ringers. 1000 milliLiter(s) (125 mL/Hr) IV Continuous <Continuous>  lactated ringers. 1000 milliLiter(s) (125 mL/Hr) IV Continuous <Continuous>  lactated ringers. 1000 milliLiter(s) (75 mL/Hr) IV Continuous <Continuous>  lactated ringers. 1000 milliLiter(s) (125 mL/Hr) IV Continuous <Continuous>    MEDICATIONS  (PRN):  dexAMETHasone  Injectable 4 milliGRAM(s) IV Push every 6 hours PRN Nausea  diphenhydrAMINE 25 milliGRAM(s) Oral every 6 hours PRN Pruritus  lanolin Ointment 1 Application(s) Topical every 6 hours PRN Sore Nipples  magnesium hydroxide Suspension 30 milliLiter(s) Oral two times a day PRN Constipation  naloxone Injectable 0.1 milliGRAM(s) IV Push every 3 minutes PRN For ANY of the following changes in patient status:  A. Breaths Per Minute LESS THAN 10, B. Oxygen saturation LESS THAN 90%, C. Sedation score of 6 for Stop After: 4 Times  naloxone Injectable 0.1 milliGRAM(s) IV Push every 3 minutes PRN For ANY of the following changes in patient status:  A. RR LESS THAN 10 breaths per minute, B. Oxygen saturation LESS THAN 90%, C. Sedation score of 6  ondansetron Injectable 4 milliGRAM(s) IV Push every 6 hours PRN Nausea  ondansetron Injectable 4 milliGRAM(s) IV Push every 6 hours PRN Nausea  oxyCODONE    IR 5 milliGRAM(s) Oral every 3 hours PRN Moderate to Severe Pain (4-10)  oxyCODONE    IR 5 milliGRAM(s) Oral once PRN Moderate to Severe Pain (4-10)  simethicone 80 milliGRAM(s) Chew every 4 hours PRN Gas      OBJECTIVE:   [X] No new signs     [ ] Other:    Side Effects:  [X ] None			[ ] Other:    Assessment of Catheter Site:		[ ] Intact		[ X] Other: Discontinued    ASSESSMENT/PLAN  [ ] Continue current therapy    [ x] Therapy changed to:    [ ] IV PCA       [ ] Epidural     [ x] prn Analgesics     Comments: Epidural discontinued, PRN analgesics     Note written after patient seen

## 2022-08-30 NOTE — PROGRESS NOTE ADULT - SUBJECTIVE AND OBJECTIVE BOX
Anesthesia Pain Management Service: Day 2__ of Epidural    SUBJECTIVE: Patient is doing well with PCEA, and has full sensation on both legs.  Denies any headaches or numnbess or tingling of lower extremities. She is  able to move bilateral lower extremities, except she does report problems lifting right thigh.      Pain Scale Score: 2-3/10  Refer to charted pain scores    THERAPY:  s/p intrathecal Morphine and    [x ] Epidural Bupivacaine 0.0625% and Hydromorphone  		[X ] 10 micrograms/mL	[ ] 5 micrograms/mL  [ ] Epidural Bupivacaine 0.0625% and Fentanyl - 2 micrograms/mL  [ ] Epidural Ropivacaine 0.1% plain – 1 mg/mL  [ ] Patient Controlled Regional Anesthesia (PCRA) Ropivacaine  		[ ] 0.2%			[ ] 0.1%      Demand dose __3_ lockout __15_ (minutes) Continuous Rate ___ Total: ___ Daily      MEDICATIONS  (STANDING):  acetaminophen     Tablet .. 975 milliGRAM(s) Oral <User Schedule>  citric acid/sodium citrate Solution 30 milliLiter(s) Oral once  citric acid/sodium citrate Solution 30 milliLiter(s) Oral once  diphtheria/tetanus/pertussis (acellular) Vaccine (ADAcel) 0.5 milliLiter(s) IntraMuscular once  heparin   Injectable 5000 Unit(s) SubCutaneous every 12 hours  ibuprofen  Tablet. 600 milliGRAM(s) Oral every 6 hours  ketorolac   Injectable 30 milliGRAM(s) IV Push every 6 hours  lactated ringers Bolus 500 milliLiter(s) IV Bolus once  lactated ringers Bolus 1000 milliLiter(s) IV Bolus once  lactated ringers Bolus 1000 milliLiter(s) IV Bolus once  lactated ringers Bolus 1000 milliLiter(s) IV Bolus once  lactated ringers. 1000 milliLiter(s) (125 mL/Hr) IV Continuous <Continuous>  lactated ringers. 1000 milliLiter(s) (125 mL/Hr) IV Continuous <Continuous>  lactated ringers. 1000 milliLiter(s) (75 mL/Hr) IV Continuous <Continuous>  lactated ringers. 1000 milliLiter(s) (125 mL/Hr) IV Continuous <Continuous>    MEDICATIONS  (PRN):  dexAMETHasone  Injectable 4 milliGRAM(s) IV Push every 6 hours PRN Nausea  diphenhydrAMINE 25 milliGRAM(s) Oral every 6 hours PRN Pruritus  lanolin Ointment 1 Application(s) Topical every 6 hours PRN Sore Nipples  magnesium hydroxide Suspension 30 milliLiter(s) Oral two times a day PRN Constipation  naloxone Injectable 0.1 milliGRAM(s) IV Push every 3 minutes PRN For ANY of the following changes in patient status:  A. Breaths Per Minute LESS THAN 10, B. Oxygen saturation LESS THAN 90%, C. Sedation score of 6 for Stop After: 4 Times  naloxone Injectable 0.1 milliGRAM(s) IV Push every 3 minutes PRN For ANY of the following changes in patient status:  A. RR LESS THAN 10 breaths per minute, B. Oxygen saturation LESS THAN 90%, C. Sedation score of 6  ondansetron Injectable 4 milliGRAM(s) IV Push every 6 hours PRN Nausea  ondansetron Injectable 4 milliGRAM(s) IV Push every 6 hours PRN Nausea  oxyCODONE    IR 5 milliGRAM(s) Oral every 3 hours PRN Moderate to Severe Pain (4-10)  oxyCODONE    IR 5 milliGRAM(s) Oral once PRN Moderate to Severe Pain (4-10)  simethicone 80 milliGRAM(s) Chew every 4 hours PRN Gas      OBJECTIVE:  Patient is sitting up in chair.     Assessment of Catheter Site:	[ ] Left	[ ] Right  [x ] Epidural 	[ ] Femoral	      [ ] Saphenous   [ ] Supraclavicular   [ ] Other:    [x ] Dressing intact	[x ] Site non-tender	[ x] Site without erythema, discharge, edema  [x ] Epidural tubing and connection checked	[x] Gross neurological exam within normal limits  [X ] Catheter removed – tip intact		[ ] Afebrile	  [ ] Febrile: ___       [ X] see Temp under VS below)    PT/INR - ( 30 Aug 2022 05:10 )   PT: 13.2 sec;   INR: 1.14 ratio         PTT - ( 30 Aug 2022 05:10 )  PTT:30.2 sec                      10.0   11.84 )-----------( 278      ( 30 Aug 2022 09:04 )             31.4     Vital Signs Last 24 Hrs  T(C): 36.9 (08-30-22 @ 10:36), Max: 36.9 (08-29-22 @ 16:43)  T(F): 98.4 (08-30-22 @ 10:36), Max: 98.5 (08-29-22 @ 16:43)  HR: 66 (08-30-22 @ 10:36) (64 - 92)  BP: 115/53 (08-30-22 @ 10:36) (94/50 - 115/64)  BP(mean): 57 (08-29-22 @ 15:30) (53 - 76)  RR: 18 (08-30-22 @ 10:36) (12 - 19)  SpO2: 100% (08-30-22 @ 10:36) (97% - 100%)      Sedation Score:	[x ] Alert	[ ] Drowsy	[ ] Arousable	[ ] Asleep	[ ] Unresponsive    Side Effects:	[x ] None	[ ] Nausea	[ ] Vomiting	[ ] Pruritus  		[ ] Weakness		[ ] Numbness	[ ] Other:    ASSESSMENT/ PLAN:    Therapy to  be:	[ ] Continue   [ X] Discontinued   [ X] Change to prn Analgesics    Documentation and Verification of current medications:  [ X ] Done	[ ] Not done, not eligible, reason:    Comments: As per request of team, PCEA discontinued and changed to IV/oral opioid and/or non-opioid Adjuvant analgesics to be used at this point.  Informed patient that if right thigh motor function worsens to inform the RN immediately to inform our team and the primary team.    Progress Note written now but Patient was seen earlier. Anesthesia Pain Management Service: Day 2__ of Epidural    SUBJECTIVE: Patient is doing well with PCEA, and has full sensation on both legs.  Denies any headaches or numnbess or tingling of lower extremities. She is  able to move bilateral lower extremities, except she does report problems lifting right thigh.      Pain Scale Score: 2-3/10  Refer to charted pain scores    THERAPY:  s/p intrathecal Morphine and    [x ] Epidural Bupivacaine 0.0625% and Hydromorphone  		[X ] 10 micrograms/mL	[ ] 5 micrograms/mL  [ ] Epidural Bupivacaine 0.0625% and Fentanyl - 2 micrograms/mL  [ ] Epidural Ropivacaine 0.1% plain – 1 mg/mL  [ ] Patient Controlled Regional Anesthesia (PCRA) Ropivacaine  		[ ] 0.2%			[ ] 0.1%      Demand dose __3_ lockout __15_ (minutes) Continuous Rate ___ Total: ___ Daily      MEDICATIONS  (STANDING):  acetaminophen     Tablet .. 975 milliGRAM(s) Oral <User Schedule>  citric acid/sodium citrate Solution 30 milliLiter(s) Oral once  citric acid/sodium citrate Solution 30 milliLiter(s) Oral once  diphtheria/tetanus/pertussis (acellular) Vaccine (ADAcel) 0.5 milliLiter(s) IntraMuscular once  heparin   Injectable 5000 Unit(s) SubCutaneous every 12 hours  ibuprofen  Tablet. 600 milliGRAM(s) Oral every 6 hours  ketorolac   Injectable 30 milliGRAM(s) IV Push every 6 hours  lactated ringers Bolus 500 milliLiter(s) IV Bolus once  lactated ringers Bolus 1000 milliLiter(s) IV Bolus once  lactated ringers Bolus 1000 milliLiter(s) IV Bolus once  lactated ringers Bolus 1000 milliLiter(s) IV Bolus once  lactated ringers. 1000 milliLiter(s) (125 mL/Hr) IV Continuous <Continuous>  lactated ringers. 1000 milliLiter(s) (125 mL/Hr) IV Continuous <Continuous>  lactated ringers. 1000 milliLiter(s) (75 mL/Hr) IV Continuous <Continuous>  lactated ringers. 1000 milliLiter(s) (125 mL/Hr) IV Continuous <Continuous>    MEDICATIONS  (PRN):  dexAMETHasone  Injectable 4 milliGRAM(s) IV Push every 6 hours PRN Nausea  diphenhydrAMINE 25 milliGRAM(s) Oral every 6 hours PRN Pruritus  lanolin Ointment 1 Application(s) Topical every 6 hours PRN Sore Nipples  magnesium hydroxide Suspension 30 milliLiter(s) Oral two times a day PRN Constipation  naloxone Injectable 0.1 milliGRAM(s) IV Push every 3 minutes PRN For ANY of the following changes in patient status:  A. Breaths Per Minute LESS THAN 10, B. Oxygen saturation LESS THAN 90%, C. Sedation score of 6 for Stop After: 4 Times  naloxone Injectable 0.1 milliGRAM(s) IV Push every 3 minutes PRN For ANY of the following changes in patient status:  A. RR LESS THAN 10 breaths per minute, B. Oxygen saturation LESS THAN 90%, C. Sedation score of 6  ondansetron Injectable 4 milliGRAM(s) IV Push every 6 hours PRN Nausea  ondansetron Injectable 4 milliGRAM(s) IV Push every 6 hours PRN Nausea  oxyCODONE    IR 5 milliGRAM(s) Oral every 3 hours PRN Moderate to Severe Pain (4-10)  oxyCODONE    IR 5 milliGRAM(s) Oral once PRN Moderate to Severe Pain (4-10)  simethicone 80 milliGRAM(s) Chew every 4 hours PRN Gas      OBJECTIVE:  Patient is sitting up in chair.     Assessment of Catheter Site:	[ ] Left	[ ] Right  [x ] Epidural 	[ ] Femoral	      [ ] Saphenous   [ ] Supraclavicular   [ ] Other:    [x ] Dressing intact	[x ] Site non-tender	[ x] Site without erythema, discharge, edema  [x ] Epidural tubing and connection checked	[x] Gross neurological exam within normal limits  [X ] Catheter removed – tip intact		[ ] Afebrile	  [ ] Febrile: ___       [ X] see Temp under VS below)    PT/INR - ( 30 Aug 2022 05:10 )   PT: 13.2 sec;   INR: 1.14 ratio         PTT - ( 30 Aug 2022 05:10 )  PTT:30.2 sec                      10.0   11.84 )-----------( 278      ( 30 Aug 2022 09:04 )             31.4     Vital Signs Last 24 Hrs  T(C): 36.9 (08-30-22 @ 10:36), Max: 36.9 (08-29-22 @ 16:43)  T(F): 98.4 (08-30-22 @ 10:36), Max: 98.5 (08-29-22 @ 16:43)  HR: 66 (08-30-22 @ 10:36) (64 - 92)  BP: 115/53 (08-30-22 @ 10:36) (94/50 - 115/64)  BP(mean): 57 (08-29-22 @ 15:30) (53 - 76)  RR: 18 (08-30-22 @ 10:36) (12 - 19)  SpO2: 100% (08-30-22 @ 10:36) (97% - 100%)      Sedation Score:	[x ] Alert	[ ] Drowsy	[ ] Arousable	[ ] Asleep	[ ] Unresponsive    Side Effects:	[x ] None	[ ] Nausea	[ ] Vomiting	[ ] Pruritus  		[ ] Weakness		[ ] Numbness	[ ] Other:    ASSESSMENT/ PLAN:    Therapy to  be:	[ ] Continue   [ X] Discontinued   [ X] Change to prn Analgesics    Documentation and Verification of current medications:  [ X ] Done	[ ] Not done, not eligible, reason:    Comments: As per request of team, PCEA discontinued and changed to IV/oral opioid and/or non-opioid Adjuvant analgesics to be used at this point.  Informed patient that if right thigh motor function worsens to inform the RN immediately to inform our team and the primary team.    Progress Note written now but Patient was seen earlier.    Addendum: 8/30/22 @0139 As per RN, patient feels that her right thigh is better now and has gained more motor function.

## 2022-08-30 NOTE — PROVIDER CONTACT NOTE (CRITICAL VALUE NOTIFICATION) - ASSESSMENT
VS WNL, pt has small amount of vaginal bleeding from surgery, no other s/s of bleeding present. pt asymptomatic at this time

## 2022-08-31 ENCOUNTER — TRANSCRIPTION ENCOUNTER (OUTPATIENT)
Age: 37
End: 2022-08-31

## 2022-08-31 VITALS
HEART RATE: 64 BPM | RESPIRATION RATE: 16 BRPM | OXYGEN SATURATION: 100 % | DIASTOLIC BLOOD PRESSURE: 50 MMHG | TEMPERATURE: 98 F | SYSTOLIC BLOOD PRESSURE: 111 MMHG

## 2022-08-31 LAB
APTT BLD: 31.3 SEC — SIGNIFICANT CHANGE UP (ref 27–36.3)
BASOPHILS # BLD AUTO: 0.06 K/UL — SIGNIFICANT CHANGE UP (ref 0–0.2)
BASOPHILS NFR BLD AUTO: 0.6 % — SIGNIFICANT CHANGE UP (ref 0–2)
EOSINOPHIL # BLD AUTO: 0.13 K/UL — SIGNIFICANT CHANGE UP (ref 0–0.5)
EOSINOPHIL NFR BLD AUTO: 1.2 % — SIGNIFICANT CHANGE UP (ref 0–6)
HCT VFR BLD CALC: 33.4 % — LOW (ref 34.5–45)
HGB BLD-MCNC: 10.5 G/DL — LOW (ref 11.5–15.5)
IANC: 6.34 K/UL — SIGNIFICANT CHANGE UP (ref 1.8–7.4)
IMM GRANULOCYTES NFR BLD AUTO: 0.6 % — SIGNIFICANT CHANGE UP (ref 0–1.5)
INR BLD: 0.91 RATIO — SIGNIFICANT CHANGE UP (ref 0.88–1.16)
LYMPHOCYTES # BLD AUTO: 28.6 % — SIGNIFICANT CHANGE UP (ref 13–44)
LYMPHOCYTES # BLD AUTO: 3.08 K/UL — SIGNIFICANT CHANGE UP (ref 1–3.3)
MCHC RBC-ENTMCNC: 27.7 PG — SIGNIFICANT CHANGE UP (ref 27–34)
MCHC RBC-ENTMCNC: 31.4 GM/DL — LOW (ref 32–36)
MCV RBC AUTO: 88.1 FL — SIGNIFICANT CHANGE UP (ref 80–100)
MONOCYTES # BLD AUTO: 1.11 K/UL — HIGH (ref 0–0.9)
MONOCYTES NFR BLD AUTO: 10.3 % — SIGNIFICANT CHANGE UP (ref 2–14)
NEUTROPHILS # BLD AUTO: 6.34 K/UL — SIGNIFICANT CHANGE UP (ref 1.8–7.4)
NEUTROPHILS NFR BLD AUTO: 58.7 % — SIGNIFICANT CHANGE UP (ref 43–77)
NRBC # BLD: 0 /100 WBCS — SIGNIFICANT CHANGE UP (ref 0–0)
NRBC # FLD: 0 K/UL — SIGNIFICANT CHANGE UP (ref 0–0)
PLATELET # BLD AUTO: 286 K/UL — SIGNIFICANT CHANGE UP (ref 150–400)
PROTHROM AB SERPL-ACNC: 10.5 SEC — SIGNIFICANT CHANGE UP (ref 10.5–13.4)
RBC # BLD: 3.79 M/UL — LOW (ref 3.8–5.2)
RBC # FLD: 16.7 % — HIGH (ref 10.3–14.5)
SURGICAL PATHOLOGY STUDY: SIGNIFICANT CHANGE UP
WBC # BLD: 10.78 K/UL — HIGH (ref 3.8–10.5)
WBC # FLD AUTO: 10.78 K/UL — HIGH (ref 3.8–10.5)

## 2022-08-31 RX ORDER — IBUPROFEN 200 MG
600 TABLET ORAL EVERY 6 HOURS
Refills: 0 | Status: DISCONTINUED | OUTPATIENT
Start: 2022-08-31 | End: 2022-08-31

## 2022-08-31 RX ORDER — OXYTOCIN 10 UNIT/ML
2 VIAL (ML) INJECTION
Qty: 30 | Refills: 0 | Status: DISCONTINUED | OUTPATIENT
Start: 2022-08-31 | End: 2022-08-31

## 2022-08-31 RX ORDER — OXYCODONE HYDROCHLORIDE 5 MG/1
5 TABLET ORAL EVERY 6 HOURS
Refills: 0 | Status: DISCONTINUED | OUTPATIENT
Start: 2022-08-31 | End: 2022-08-31

## 2022-08-31 RX ORDER — OXYCODONE HYDROCHLORIDE 5 MG/1
1 TABLET ORAL
Qty: 6 | Refills: 0
Start: 2022-08-31 | End: 2022-09-01

## 2022-08-31 RX ORDER — IBUPROFEN 200 MG
1 TABLET ORAL
Qty: 0 | Refills: 0 | DISCHARGE
Start: 2022-08-31

## 2022-08-31 RX ADMIN — Medication 975 MILLIGRAM(S): at 09:35

## 2022-08-31 RX ADMIN — Medication 975 MILLIGRAM(S): at 05:15

## 2022-08-31 RX ADMIN — Medication 600 MILLIGRAM(S): at 01:45

## 2022-08-31 RX ADMIN — Medication 975 MILLIGRAM(S): at 04:29

## 2022-08-31 RX ADMIN — Medication 975 MILLIGRAM(S): at 10:19

## 2022-08-31 RX ADMIN — Medication 600 MILLIGRAM(S): at 09:36

## 2022-08-31 RX ADMIN — Medication 600 MILLIGRAM(S): at 10:19

## 2022-08-31 RX ADMIN — Medication 600 MILLIGRAM(S): at 01:07

## 2022-08-31 RX ADMIN — OXYCODONE HYDROCHLORIDE 5 MILLIGRAM(S): 5 TABLET ORAL at 04:52

## 2022-08-31 NOTE — PROVIDER CONTACT NOTE (OTHER) - RECOMMENDATIONS
Please put provider to RN Okay to hold off until team rounds/or breakfast time (8am) for am labs, VS, & Heparin medication. RN will let Pt sleep a little more for now

## 2022-08-31 NOTE — DISCHARGE NOTE OB - NS MD DC FALL RISK RISK
For information on Fall & Injury Prevention, visit: [email or web address] OR  [email or web address] OR  https://www.cdc.gov/steadi/patient.html

## 2022-08-31 NOTE — DISCHARGE NOTE OB - PATIENT PORTAL LINK FT
You can access the FollowMyHealth Patient Portal offered by [facility] by registering at the following website: [email or web address]. By joining [employer]’s FollowMyHealth portal, you will also be able to view your health information using other applications (apps) compatible with our system.

## 2022-08-31 NOTE — DISCHARGE NOTE OB - HOSPITAL COURSE
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 , no transfusion given intrapartum. Patient is doing well post-operatively, PCEA discontinued today by anesthesia. She has been advanced to a regular diet. Urinary phoenix just removed, patient pending void trial. Patient's H/H were low on routine labs performed this morning however she had no signs/symptoms of anemia; repeated H/H was within normal limits. Her midline vertical incision is well-healing and her pain has been overall well-controlled. Baby passed away in PICU, pt seen by KLAUDIA and eyal.

## 2022-08-31 NOTE — DISCHARGE NOTE OB - DO NOT DRIVE OR OPERATE HEAVY MACHINERY WHEN TAKING NARCOTICS/PRESCRIPTION PAIN MEDICATION THAT CAN CHANGE YOUR MENTAL STATE OR CAUSE DROWSINESS
Group Topic: BH Coping Skills Education    Date: 4/7/2020  Start Time: 1330  End Time: 1430  Facilitators: Melo Valles CNA    Focus: coping skills education  Number in attendance: 7    Method: Group  Attendance: Present  Participation: Active  Patient Response: Appropriate feedback  Mood: Indifferent  Affect: Calm  Behavior/Socialization: Cooperative  Thought Process: [place] thinking  Task Performance: Follows directions     Statement Selected

## 2022-08-31 NOTE — DISCHARGE NOTE OB - PLAN OF CARE
Make your follow-up appointment with your doctor for 1 week after discharge. No heavy lifting, driving, or strenuous activity for 6 weeks. Nothing per vagina such as tampons, intercourse, douches or tub baths for 6 weeks or until you see your doctor. Call your doctor with any signs and symptoms of infection such as fever, chills, nausea, or vomiting. Call your doctor with redness or swelling at the incision site, fluid leakage, or wound separation. Call your doctor if you're unable to tolerate food, have an increase in vaginal bleeding, or have difficulty urinating. Call your doctor if you have pain that is not relieved by your prescribed medications. Notify your doctor with any other concerns.

## 2022-08-31 NOTE — PROGRESS NOTE ADULT - SUBJECTIVE AND OBJECTIVE BOX
Postpartum Note,  Hysterectomy  Post op Day 2    Subjective:  The patient feels well physically.  She is ambulating.   She is tolerating regular diet.  She denies nausea and vomiting.  She is voiding.  Her pain is controlled.  She reports normal postpartum bleeding.  Baby  during the night. Patient tearful.  Support offered.    Physical exam:    Vital Signs Last 24 Hrs  T(C): 36.7 (31 Aug 2022 01:), Max: 36.9 (30 Aug 2022 10:36)  T(F): 98 (31 Aug 2022 01:), Max: 98.4 (30 Aug 2022 10:36)  HR: 95 (31 Aug 2022 01:27) (65 - 95)  BP: 114/67 (31 Aug 2022 01:) (109/60 - 115/53)  BP(mean): --  RR: 17 (31 Aug 2022 01:27) (17 - 18)  SpO2: 99% (31 Aug 2022 01:27) (96% - 100%)    Parameters below as of 31 Aug 2022 01:27  Patient On (Oxygen Delivery Method): room air        Gen: NAD    Abdomen: Soft, nontender, no distension , firm uterine fundus at umbilicus.  Incision: Clean, dry, and intact  Pelvic: Normal lochia noted  Ext: No calf tenderness    LABS:                        10.0   11.84 )-----------( 278      ( 30 Aug 2022 09:04 )             31.4       Rubella status:     Allergies    No Known Allergies    Intolerances      MEDICATIONS  (STANDING):  acetaminophen     Tablet .. 975 milliGRAM(s) Oral <User Schedule>  diphtheria/tetanus/pertussis (acellular) Vaccine (ADAcel) 0.5 milliLiter(s) IntraMuscular once  ferrous    sulfate 325 milliGRAM(s) Oral daily  heparin   Injectable 5000 Unit(s) SubCutaneous every 12 hours  ibuprofen  Tablet. 600 milliGRAM(s) Oral every 6 hours  ibuprofen  Tablet. 600 milliGRAM(s) Oral every 6 hours  lactated ringers Bolus 500 milliLiter(s) IV Bolus once  lactated ringers Bolus 1000 milliLiter(s) IV Bolus once  prenatal multivitamin 1 Tablet(s) Oral daily    MEDICATIONS  (PRN):  dexAMETHasone  Injectable 4 milliGRAM(s) IV Push every 6 hours PRN Nausea  diphenhydrAMINE 25 milliGRAM(s) Oral every 6 hours PRN Pruritus  lanolin Ointment 1 Application(s) Topical every 6 hours PRN Sore Nipples  magnesium hydroxide Suspension 30 milliLiter(s) Oral two times a day PRN Constipation  naloxone Injectable 0.1 milliGRAM(s) IV Push every 3 minutes PRN For ANY of the following changes in patient status:  A. Breaths Per Minute LESS THAN 10, B. Oxygen saturation LESS THAN 90%, C. Sedation score of 6 for Stop After: 4 Times  naloxone Injectable 0.1 milliGRAM(s) IV Push every 3 minutes PRN For ANY of the following changes in patient status:  A. RR LESS THAN 10 breaths per minute, B. Oxygen saturation LESS THAN 90%, C. Sedation score of 6  ondansetron Injectable 4 milliGRAM(s) IV Push every 6 hours PRN Nausea  ondansetron Injectable 4 milliGRAM(s) IV Push every 6 hours PRN Nausea  oxyCODONE    IR 5 milliGRAM(s) Oral every 6 hours PRN Moderate Pain (4 - 6)  simethicone 80 milliGRAM(s) Chew every 4 hours PRN Gas        Assessment and Plan  POD # 2 s/p  hysterectomy  Appropriate  Encourage ambulation.  ND home   FU 1 weeks  Instruction given  Questions answered  Social work to finish evaluation prior to discharge  SHER Xavier

## 2022-08-31 NOTE — DISCHARGE NOTE OB - MEDICATION SUMMARY - MEDICATIONS TO TAKE
I will START or STAY ON the medications listed below when I get home from the hospital:    ibuprofen 600 mg oral tablet  -- 1 tab(s) by mouth every 6 hours, As Needed  -- Indication: For Pain    oxyCODONE 5 mg oral tablet  -- 1 tab(s) by mouth every 8 hours, As Needed -Moderate Pain (4 - 6) - for severe pain MDD:3  -- Indication: For severe pain    ferrous sulfate 325 mg (65 mg elemental iron) oral tablet  -- 1 tab(s) by mouth once a day  -- Indication: For Post c/s    Synthroid 50 mcg (0.05 mg) oral tablet  -- 1 tab(s) by mouth once a day  -- Indication: For home med    folic acid 1 mg oral tablet  -- 1 tab(s) by mouth once a day  -- Indication: For home med

## 2022-08-31 NOTE — DISCHARGE NOTE OB - CARE PROVIDER_API CALL
Carl Cruz)  MaternalFetal Medicine; Obstetrics and Gynecology  [address] [place] [id number]  Phone: [phone]  Fax: [phone]  Follow Up Time:

## 2022-08-31 NOTE — PROVIDER CONTACT NOTE (OTHER) - ACTION/TREATMENT ORDERED:
Please let Pt sleep for now, Okay to do labs, VS, & morning Heparin around breakfast time. MD will put provider to RN after signoff

## 2022-08-31 NOTE — DISCHARGE NOTE OB - CARE PLAN
1 Principal Discharge DX:	Delivery by  hysterectomy  Assessment and plan of treatment:	Make your follow-up appointment with your doctor for 1 week after discharge. No heavy lifting, driving, or strenuous activity for 6 weeks. Nothing per vagina such as tampons, intercourse, douches or tub baths for 6 weeks or until you see your doctor. Call your doctor with any signs and symptoms of infection such as fever, chills, nausea, or vomiting. Call your doctor with redness or swelling at the incision site, fluid leakage, or wound separation. Call your doctor if you're unable to tolerate food, have an increase in vaginal bleeding, or have difficulty urinating. Call your doctor if you have pain that is not relieved by your prescribed medications. Notify your doctor with any other concerns.

## 2022-09-13 ENCOUNTER — APPOINTMENT (OUTPATIENT)
Dept: ANTEPARTUM | Facility: CLINIC | Age: 37
End: 2022-09-13

## 2022-09-13 ENCOUNTER — APPOINTMENT (OUTPATIENT)
Dept: MATERNAL FETAL MEDICINE | Facility: CLINIC | Age: 37
End: 2022-09-13

## 2022-09-13 DIAGNOSIS — Z98.890 OTHER SPECIFIED POSTPROCEDURAL STATES: ICD-10-CM

## 2022-09-23 NOTE — OB RN PATIENT PROFILE - SURGICAL SITE INCISION
Health Maintenance Due   Topic Date Due   • COVID-19 Vaccine (1) Never done   • Influenza Vaccine (1) 09/01/2022       Patient is due for topics as listed above but is not proceeding with Immunization(s) COVID-19 and Influenza at this time. Education provided for Immunization(s) COVID-19 and Influenza.   no

## 2022-10-17 NOTE — OB RN PATIENT PROFILE - NS_FINALEDD_OBGYN_ALL_OB_DT
On Solu-Medrol.,  CellCept 1 g p.o. b.i.d., Plaquenil 200 mg p.o. b.i.d..  Start Benlysta 200 mg subcutaneously once a week.  I placed the order as [facility] nursing order. Spoke to Nurse and not received. Nurse did verify with pharmacy that Benlysta is not accessible in the hospital. Plan is to provide sample from clinic today to nurse to start Benlysta injections today..   02-Jun-2021

## 2022-10-18 ENCOUNTER — APPOINTMENT (OUTPATIENT)
Dept: ANTEPARTUM | Facility: CLINIC | Age: 37
End: 2022-10-18

## 2022-10-18 ENCOUNTER — APPOINTMENT (OUTPATIENT)
Dept: MATERNAL FETAL MEDICINE | Facility: CLINIC | Age: 37
End: 2022-10-18

## 2022-10-18 VITALS
BODY MASS INDEX: 25.16 KG/M2 | DIASTOLIC BLOOD PRESSURE: 62 MMHG | OXYGEN SATURATION: 98 % | HEIGHT: 63 IN | SYSTOLIC BLOOD PRESSURE: 102 MMHG | HEART RATE: 104 BPM | WEIGHT: 142 LBS | RESPIRATION RATE: 18 BRPM

## 2022-10-18 DIAGNOSIS — Z12.72 ENCOUNTER FOR SCREENING FOR MALIGNANT NEOPLASM OF VAGINA: ICD-10-CM

## 2022-10-19 NOTE — DISCHARGE NOTE OB - THE PATIENT HAS
Pt pref DVO, using OTC near over. Has tried MF, mono in past and could not tolerate. no difficulties

## 2022-10-20 LAB — HPV HIGH+LOW RISK DNA PNL CVX: NOT DETECTED

## 2022-10-24 LAB — CYTOLOGY CVX/VAG DOC THIN PREP: NORMAL

## 2023-07-12 ENCOUNTER — OFFICE (OUTPATIENT)
Dept: URBAN - METROPOLITAN AREA CLINIC 104 | Facility: CLINIC | Age: 38
Setting detail: OPHTHALMOLOGY
End: 2023-07-12
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
## 2023-07-12 PROCEDURE — NO SHOW FE NO SHOW FEE: Performed by: SPECIALIST

## 2023-07-24 NOTE — DISCHARGE NOTE OB - MEDICATION SUMMARY - MEDICATIONS TO TAKE
during picture description task./groping
I will START or STAY ON the medications listed below when I get home from the hospital:    Prenatal vitamin  -- once a day  -- Indication: For Continue home med    Iron  -- every other day (at bedtime)  -- Indication: For Continue home med    acetaminophen 325 mg oral tablet  -- 3 tab(s) by mouth every 6 hours   -- Indication: For as needed for pain    ibuprofen 600 mg oral tablet  -- 1 tab(s) by mouth every 6 hours  -- Indication: For as needed for pain

## 2023-07-26 NOTE — DISCHARGE NOTE OB - DRINK 8 TO 10 GLASSES OF FLUID EACH DAY
R side facial pain - unsure if it is being caused by R lower wisdom tooth that she already had an appointment scheduled for. Statement Selected

## 2023-08-01 ENCOUNTER — NON-APPOINTMENT (OUTPATIENT)
Age: 38
End: 2023-08-01

## 2023-08-29 NOTE — DISCHARGE NOTE OB - DO YOU HAVE DIFFICULTY CLIMBING STAIRS
How Severe Are Your Spot(S)?: mild
Additional History: Patient states allergist did general check up 2 weeks ago and checked the patients back and advised he follow up with a dermatologist. He believed some lesions looks suspicious and he recommended patients over 50 have a skin check.
No

## 2023-09-06 NOTE — OB PST NOTE - NSOTHERRISK_OBGYN_A_OB
12-hour chart check complete.    Monitor Summary  Rhythm: sinus bradycardia, sinus rhythm  Rate: 40s-60s  Ectopy: n/a  Measurements: .13/.06/.46               None

## 2023-09-15 NOTE — ED STATDOCS - PROGRESS NOTE DETAILS
No REBA Abraham NOTE: Pt evaluated at bedside. Pt has appt with her GYN tomorrow. Pt evaluated prior by intake physician. Otherwise HPI/PE/ROS as noted above. Will follow up plan per intake physician. REBA Abraham NOTE: Reviewed all results and plan; will advise pelvic rest and return for worsening   Pt stable for d/c, reports improvement, VSS, tolerating PO, ambulatory.  Discussion includes results, plan, and return precautions. Pt advised to f/u with PMD 1-2 days and specialists discussed.  Printed copies of available lab/radiology results contained within discharge packet. Pt verbalized understanding/agreement of plan.

## 2023-10-27 RX ORDER — FERROUS SULFATE 325(65) MG
1 TABLET ORAL
Qty: 0 | Refills: 0 | DISCHARGE

## 2023-10-27 RX ORDER — LEVOTHYROXINE SODIUM 125 MCG
1 TABLET ORAL
Qty: 0 | Refills: 0 | DISCHARGE

## 2023-11-04 ENCOUNTER — EMERGENCY (EMERGENCY)
Facility: HOSPITAL | Age: 38
LOS: 1 days | Discharge: HOME | End: 2023-11-04
Attending: EMERGENCY MEDICINE
Payer: COMMERCIAL

## 2023-11-04 VITALS
HEART RATE: 62 BPM | RESPIRATION RATE: 18 BRPM | SYSTOLIC BLOOD PRESSURE: 111 MMHG | OXYGEN SATURATION: 99 % | TEMPERATURE: 98 F | DIASTOLIC BLOOD PRESSURE: 68 MMHG

## 2023-11-04 VITALS
RESPIRATION RATE: 18 BRPM | HEART RATE: 58 BPM | OXYGEN SATURATION: 99 % | DIASTOLIC BLOOD PRESSURE: 82 MMHG | SYSTOLIC BLOOD PRESSURE: 123 MMHG | TEMPERATURE: 98 F

## 2023-11-04 DIAGNOSIS — Z98.890 OTHER SPECIFIED POSTPROCEDURAL STATES: Chronic | ICD-10-CM

## 2023-11-04 DIAGNOSIS — Z98.891 HISTORY OF UTERINE SCAR FROM PREVIOUS SURGERY: Chronic | ICD-10-CM

## 2023-11-04 PROBLEM — E03.9 HYPOTHYROIDISM, UNSPECIFIED: Chronic | Status: ACTIVE | Noted: 2020-01-28

## 2023-11-04 PROBLEM — U07.1 COVID-19: Chronic | Status: ACTIVE | Noted: 2021-05-12

## 2023-11-04 PROBLEM — D64.9 ANEMIA, UNSPECIFIED: Chronic | Status: ACTIVE | Noted: 2021-05-12

## 2023-11-04 PROBLEM — M25.569 PAIN IN UNSPECIFIED KNEE: Chronic | Status: ACTIVE | Noted: 2019-02-06

## 2023-11-04 PROBLEM — O43.219 PLACENTA ACCRETA, UNSPECIFIED TRIMESTER: Chronic | Status: ACTIVE | Noted: 2022-08-15

## 2023-11-04 LAB
ALBUMIN SERPL ELPH-MCNC: 4.7 G/DL — SIGNIFICANT CHANGE UP (ref 3.3–5.2)
ALBUMIN SERPL ELPH-MCNC: 4.7 G/DL — SIGNIFICANT CHANGE UP (ref 3.3–5.2)
ALP SERPL-CCNC: 43 U/L — SIGNIFICANT CHANGE UP (ref 40–120)
ALP SERPL-CCNC: 43 U/L — SIGNIFICANT CHANGE UP (ref 40–120)
ALT FLD-CCNC: 20 U/L — SIGNIFICANT CHANGE UP
ALT FLD-CCNC: 20 U/L — SIGNIFICANT CHANGE UP
ANION GAP SERPL CALC-SCNC: 13 MMOL/L — SIGNIFICANT CHANGE UP (ref 5–17)
ANION GAP SERPL CALC-SCNC: 13 MMOL/L — SIGNIFICANT CHANGE UP (ref 5–17)
APPEARANCE UR: CLEAR — SIGNIFICANT CHANGE UP
APPEARANCE UR: CLEAR — SIGNIFICANT CHANGE UP
AST SERPL-CCNC: 29 U/L — SIGNIFICANT CHANGE UP
AST SERPL-CCNC: 29 U/L — SIGNIFICANT CHANGE UP
BASOPHILS # BLD AUTO: 0.04 K/UL — SIGNIFICANT CHANGE UP (ref 0–0.2)
BASOPHILS # BLD AUTO: 0.04 K/UL — SIGNIFICANT CHANGE UP (ref 0–0.2)
BASOPHILS NFR BLD AUTO: 0.4 % — SIGNIFICANT CHANGE UP (ref 0–2)
BASOPHILS NFR BLD AUTO: 0.4 % — SIGNIFICANT CHANGE UP (ref 0–2)
BILIRUB SERPL-MCNC: 0.3 MG/DL — LOW (ref 0.4–2)
BILIRUB SERPL-MCNC: 0.3 MG/DL — LOW (ref 0.4–2)
BILIRUB UR-MCNC: NEGATIVE — SIGNIFICANT CHANGE UP
BILIRUB UR-MCNC: NEGATIVE — SIGNIFICANT CHANGE UP
BUN SERPL-MCNC: 12.2 MG/DL — SIGNIFICANT CHANGE UP (ref 8–20)
BUN SERPL-MCNC: 12.2 MG/DL — SIGNIFICANT CHANGE UP (ref 8–20)
CALCIUM SERPL-MCNC: 9 MG/DL — SIGNIFICANT CHANGE UP (ref 8.4–10.5)
CALCIUM SERPL-MCNC: 9 MG/DL — SIGNIFICANT CHANGE UP (ref 8.4–10.5)
CHLORIDE SERPL-SCNC: 101 MMOL/L — SIGNIFICANT CHANGE UP (ref 96–108)
CHLORIDE SERPL-SCNC: 101 MMOL/L — SIGNIFICANT CHANGE UP (ref 96–108)
CO2 SERPL-SCNC: 25 MMOL/L — SIGNIFICANT CHANGE UP (ref 22–29)
CO2 SERPL-SCNC: 25 MMOL/L — SIGNIFICANT CHANGE UP (ref 22–29)
COLOR SPEC: YELLOW — SIGNIFICANT CHANGE UP
COLOR SPEC: YELLOW — SIGNIFICANT CHANGE UP
CREAT SERPL-MCNC: 0.59 MG/DL — SIGNIFICANT CHANGE UP (ref 0.5–1.3)
CREAT SERPL-MCNC: 0.59 MG/DL — SIGNIFICANT CHANGE UP (ref 0.5–1.3)
DIFF PNL FLD: NEGATIVE — SIGNIFICANT CHANGE UP
DIFF PNL FLD: NEGATIVE — SIGNIFICANT CHANGE UP
EGFR: 119 ML/MIN/1.73M2 — SIGNIFICANT CHANGE UP
EGFR: 119 ML/MIN/1.73M2 — SIGNIFICANT CHANGE UP
EOSINOPHIL # BLD AUTO: 0.01 K/UL — SIGNIFICANT CHANGE UP (ref 0–0.5)
EOSINOPHIL # BLD AUTO: 0.01 K/UL — SIGNIFICANT CHANGE UP (ref 0–0.5)
EOSINOPHIL NFR BLD AUTO: 0.1 % — SIGNIFICANT CHANGE UP (ref 0–6)
EOSINOPHIL NFR BLD AUTO: 0.1 % — SIGNIFICANT CHANGE UP (ref 0–6)
GLUCOSE SERPL-MCNC: 106 MG/DL — HIGH (ref 70–99)
GLUCOSE SERPL-MCNC: 106 MG/DL — HIGH (ref 70–99)
GLUCOSE UR QL: NEGATIVE MG/DL — SIGNIFICANT CHANGE UP
GLUCOSE UR QL: NEGATIVE MG/DL — SIGNIFICANT CHANGE UP
HCG SERPL-ACNC: <4 MIU/ML — SIGNIFICANT CHANGE UP
HCG SERPL-ACNC: <4 MIU/ML — SIGNIFICANT CHANGE UP
HCT VFR BLD CALC: 39.2 % — SIGNIFICANT CHANGE UP (ref 34.5–45)
HCT VFR BLD CALC: 39.2 % — SIGNIFICANT CHANGE UP (ref 34.5–45)
HGB BLD-MCNC: 13 G/DL — SIGNIFICANT CHANGE UP (ref 11.5–15.5)
HGB BLD-MCNC: 13 G/DL — SIGNIFICANT CHANGE UP (ref 11.5–15.5)
IMM GRANULOCYTES NFR BLD AUTO: 0.4 % — SIGNIFICANT CHANGE UP (ref 0–0.9)
IMM GRANULOCYTES NFR BLD AUTO: 0.4 % — SIGNIFICANT CHANGE UP (ref 0–0.9)
KETONES UR-MCNC: NEGATIVE MG/DL — SIGNIFICANT CHANGE UP
KETONES UR-MCNC: NEGATIVE MG/DL — SIGNIFICANT CHANGE UP
LEUKOCYTE ESTERASE UR-ACNC: NEGATIVE — SIGNIFICANT CHANGE UP
LEUKOCYTE ESTERASE UR-ACNC: NEGATIVE — SIGNIFICANT CHANGE UP
LIDOCAIN IGE QN: 29 U/L — SIGNIFICANT CHANGE UP (ref 22–51)
LIDOCAIN IGE QN: 29 U/L — SIGNIFICANT CHANGE UP (ref 22–51)
LYMPHOCYTES # BLD AUTO: 1.37 K/UL — SIGNIFICANT CHANGE UP (ref 1–3.3)
LYMPHOCYTES # BLD AUTO: 1.37 K/UL — SIGNIFICANT CHANGE UP (ref 1–3.3)
LYMPHOCYTES # BLD AUTO: 13.6 % — SIGNIFICANT CHANGE UP (ref 13–44)
LYMPHOCYTES # BLD AUTO: 13.6 % — SIGNIFICANT CHANGE UP (ref 13–44)
MCHC RBC-ENTMCNC: 29.3 PG — SIGNIFICANT CHANGE UP (ref 27–34)
MCHC RBC-ENTMCNC: 29.3 PG — SIGNIFICANT CHANGE UP (ref 27–34)
MCHC RBC-ENTMCNC: 33.2 GM/DL — SIGNIFICANT CHANGE UP (ref 32–36)
MCHC RBC-ENTMCNC: 33.2 GM/DL — SIGNIFICANT CHANGE UP (ref 32–36)
MCV RBC AUTO: 88.5 FL — SIGNIFICANT CHANGE UP (ref 80–100)
MCV RBC AUTO: 88.5 FL — SIGNIFICANT CHANGE UP (ref 80–100)
MONOCYTES # BLD AUTO: 0.6 K/UL — SIGNIFICANT CHANGE UP (ref 0–0.9)
MONOCYTES # BLD AUTO: 0.6 K/UL — SIGNIFICANT CHANGE UP (ref 0–0.9)
MONOCYTES NFR BLD AUTO: 6 % — SIGNIFICANT CHANGE UP (ref 2–14)
MONOCYTES NFR BLD AUTO: 6 % — SIGNIFICANT CHANGE UP (ref 2–14)
NEUTROPHILS # BLD AUTO: 7.98 K/UL — HIGH (ref 1.8–7.4)
NEUTROPHILS # BLD AUTO: 7.98 K/UL — HIGH (ref 1.8–7.4)
NEUTROPHILS NFR BLD AUTO: 79.5 % — HIGH (ref 43–77)
NEUTROPHILS NFR BLD AUTO: 79.5 % — HIGH (ref 43–77)
NITRITE UR-MCNC: NEGATIVE — SIGNIFICANT CHANGE UP
NITRITE UR-MCNC: NEGATIVE — SIGNIFICANT CHANGE UP
PH UR: 6.5 — SIGNIFICANT CHANGE UP (ref 5–8)
PH UR: 6.5 — SIGNIFICANT CHANGE UP (ref 5–8)
PLATELET # BLD AUTO: 365 K/UL — SIGNIFICANT CHANGE UP (ref 150–400)
PLATELET # BLD AUTO: 365 K/UL — SIGNIFICANT CHANGE UP (ref 150–400)
POTASSIUM SERPL-MCNC: 3.5 MMOL/L — SIGNIFICANT CHANGE UP (ref 3.5–5.3)
POTASSIUM SERPL-MCNC: 3.5 MMOL/L — SIGNIFICANT CHANGE UP (ref 3.5–5.3)
POTASSIUM SERPL-SCNC: 3.5 MMOL/L — SIGNIFICANT CHANGE UP (ref 3.5–5.3)
POTASSIUM SERPL-SCNC: 3.5 MMOL/L — SIGNIFICANT CHANGE UP (ref 3.5–5.3)
PROT SERPL-MCNC: 7.4 G/DL — SIGNIFICANT CHANGE UP (ref 6.6–8.7)
PROT SERPL-MCNC: 7.4 G/DL — SIGNIFICANT CHANGE UP (ref 6.6–8.7)
PROT UR-MCNC: SIGNIFICANT CHANGE UP MG/DL
PROT UR-MCNC: SIGNIFICANT CHANGE UP MG/DL
RBC # BLD: 4.43 M/UL — SIGNIFICANT CHANGE UP (ref 3.8–5.2)
RBC # BLD: 4.43 M/UL — SIGNIFICANT CHANGE UP (ref 3.8–5.2)
RBC # FLD: 12.5 % — SIGNIFICANT CHANGE UP (ref 10.3–14.5)
RBC # FLD: 12.5 % — SIGNIFICANT CHANGE UP (ref 10.3–14.5)
SODIUM SERPL-SCNC: 139 MMOL/L — SIGNIFICANT CHANGE UP (ref 135–145)
SODIUM SERPL-SCNC: 139 MMOL/L — SIGNIFICANT CHANGE UP (ref 135–145)
SP GR SPEC: >1.03 — SIGNIFICANT CHANGE UP (ref 1–1.03)
SP GR SPEC: >1.03 — SIGNIFICANT CHANGE UP (ref 1–1.03)
UROBILINOGEN FLD QL: 1 MG/DL — SIGNIFICANT CHANGE UP (ref 0.2–1)
UROBILINOGEN FLD QL: 1 MG/DL — SIGNIFICANT CHANGE UP (ref 0.2–1)
WBC # BLD: 10.04 K/UL — SIGNIFICANT CHANGE UP (ref 3.8–10.5)
WBC # BLD: 10.04 K/UL — SIGNIFICANT CHANGE UP (ref 3.8–10.5)
WBC # FLD AUTO: 10.04 K/UL — SIGNIFICANT CHANGE UP (ref 3.8–10.5)
WBC # FLD AUTO: 10.04 K/UL — SIGNIFICANT CHANGE UP (ref 3.8–10.5)

## 2023-11-04 PROCEDURE — 99284 EMERGENCY DEPT VISIT MOD MDM: CPT | Mod: 25

## 2023-11-04 PROCEDURE — 81003 URINALYSIS AUTO W/O SCOPE: CPT

## 2023-11-04 PROCEDURE — 96374 THER/PROPH/DIAG INJ IV PUSH: CPT | Mod: XU

## 2023-11-04 PROCEDURE — 87086 URINE CULTURE/COLONY COUNT: CPT

## 2023-11-04 PROCEDURE — 85025 COMPLETE CBC W/AUTO DIFF WBC: CPT

## 2023-11-04 PROCEDURE — 83690 ASSAY OF LIPASE: CPT

## 2023-11-04 PROCEDURE — G1004: CPT

## 2023-11-04 PROCEDURE — 36000 PLACE NEEDLE IN VEIN: CPT

## 2023-11-04 PROCEDURE — 76856 US EXAM PELVIC COMPLETE: CPT | Mod: 26

## 2023-11-04 PROCEDURE — 96375 TX/PRO/DX INJ NEW DRUG ADDON: CPT

## 2023-11-04 PROCEDURE — 74177 CT ABD & PELVIS W/CONTRAST: CPT | Mod: MG

## 2023-11-04 PROCEDURE — 99285 EMERGENCY DEPT VISIT HI MDM: CPT | Mod: 25

## 2023-11-04 PROCEDURE — 76830 TRANSVAGINAL US NON-OB: CPT | Mod: 26

## 2023-11-04 PROCEDURE — 74177 CT ABD & PELVIS W/CONTRAST: CPT | Mod: 26,MG

## 2023-11-04 PROCEDURE — 80053 COMPREHEN METABOLIC PANEL: CPT

## 2023-11-04 PROCEDURE — 76830 TRANSVAGINAL US NON-OB: CPT

## 2023-11-04 PROCEDURE — 84702 CHORIONIC GONADOTROPIN TEST: CPT

## 2023-11-04 PROCEDURE — 36415 COLL VENOUS BLD VENIPUNCTURE: CPT

## 2023-11-04 PROCEDURE — 76856 US EXAM PELVIC COMPLETE: CPT

## 2023-11-04 RX ORDER — ACETAMINOPHEN 500 MG
1000 TABLET ORAL ONCE
Refills: 0 | Status: COMPLETED | OUTPATIENT
Start: 2023-11-04 | End: 2023-11-04

## 2023-11-04 RX ORDER — KETOROLAC TROMETHAMINE 30 MG/ML
15 SYRINGE (ML) INJECTION ONCE
Refills: 0 | Status: DISCONTINUED | OUTPATIENT
Start: 2023-11-04 | End: 2023-11-04

## 2023-11-04 RX ORDER — ONDANSETRON 8 MG/1
4 TABLET, FILM COATED ORAL ONCE
Refills: 0 | Status: COMPLETED | OUTPATIENT
Start: 2023-11-04 | End: 2023-11-04

## 2023-11-04 RX ADMIN — ONDANSETRON 4 MILLIGRAM(S): 8 TABLET, FILM COATED ORAL at 08:58

## 2023-11-04 RX ADMIN — Medication 15 MILLIGRAM(S): at 09:32

## 2023-11-04 RX ADMIN — Medication 400 MILLIGRAM(S): at 08:58

## 2023-11-04 NOTE — ED PROVIDER NOTE - PHYSICAL EXAMINATION
Gen: No acute distress, non toxic, sleeping on initial exam  HEENT: Mucous membranes moist, pink conjunctivae, EOMI  CV: RRR, nl s1/s2.  Resp: CTAB, normal rate and effort  GI: mild llq ttp. no rebound/guarding.   : No CVAT  Neuro: A&O x 3, moving all 4 extremities  MSK: No spine or joint tenderness to palpation  Skin: No rashes. intact and perfused.

## 2023-11-04 NOTE — ED ADULT NURSE REASSESSMENT NOTE - NS ED NURSE REASSESS COMMENT FT1
pt had increase in pain from US test being preformed. MD aware and medicated as per EMAR. pt now reports relief on most pain, awaiting CT at this time.

## 2023-11-04 NOTE — ED PROVIDER NOTE - WET READ LAUNCH FT
Pt is visible on the unit and social with peers occasionally. Pleasant and cooperative but does have an irritable edge at times. Medication and meal compliant. Denies SI/HI/AH/VH at this time. Denies any unmet needs or complaints at this time. There are no Wet Read(s) to document.

## 2023-11-04 NOTE — ED PROVIDER NOTE - OBJECTIVE STATEMENT
39 y/o female hx hysterectomy last year p/w severe acute llq pain that started at 3am. States has improved since then. usual state of health prior. +nausea, no vomiting. no f/c, no dysuria. no diarrhea/change in bowel habits. no other complaints. hasn't taken anything for symptoms

## 2023-11-04 NOTE — ED PROVIDER NOTE - PROGRESS NOTE DETAILS
Elton: sono aware and to take pt. Conde: no acute findings on  u/s or ct. labs/urine without significant findings. feeling better, benign abd. return precautions. stable for d/c.

## 2023-11-04 NOTE — ED ADULT NURSE NOTE - OBJECTIVE STATEMENT
pt reports to the ED c/o of 6/10 L lower abdominal pain. Pt a&ox3 states the pain woke her up put of her sleep at 3 am along with nausea. denies vomiting. denies trauma, denies taking medication PTA. RR wnl.  at bedside.

## 2023-11-04 NOTE — ED ADULT TRIAGE NOTE - CHIEF COMPLAINT QUOTE
Pt BIBEMS after being woken up at 0300 with intense 8/10 lower abdominal pain that radiates down her right leg. Last year pt was found to have Placenta Previa and had hysterectomy after having her child and pt denies having any symptoms after removal. Abd is soft but tender with palpation and pt endorses having intermittent nausea but no vomiting.

## 2023-11-04 NOTE — ED PROVIDER NOTE - PATIENT PORTAL LINK FT
You can access the FollowMyHealth Patient Portal offered by [facility] by registering at the following website: [email or web address]. By joining [facility]’s FollowMyHealth portal, you will also be able to view your health information using other applications (apps) compatible with our system.

## 2023-11-04 NOTE — ED ADULT NURSE NOTE - NSFALLRISK_ED_ALL_ED
Aida Masters  : 2001  Primary: [address]  Secondary: [id number] [address] @ [address]  [facility] [address]  Phone: [phone]  Fax: [phone] No data recorded  No data recorded    PT Visit Info: Total # of Visits to Date: 10        L hip labral repair DOS 22   OUTPATIENT PHYSICAL THERAPY:OP NOTE TYPE: Treatment Note and Progress Note 2022     Appt Desk   Episode      Treatment Diagnosis:  Pain in left hip (M25.552)  Stiffness of Left Hip, Not elsewhere classified (C01.970)  Medical/Referring Diagnosis:  Tear of left acetabular labrum, initial encounter [C59.613U]  Referring Physician:  Pauline Maza MD MD Orders:  PT Eval and Treat   Date of Onset:  No data recorded  Date of surgery: 22  Allergies:  Patient has no allergy information on record. see initial evaluation   Restrictions/Precautions:    No data recordedNo data recorded  per physician protocol included in paper chart  Interventions Planned (Treatment may consist of any combination of the following):    No data recorded  see initial evaluation   Subjective Comments:  Feels like he is doing well. Minimal pain. .   Initial:  0   1/10 Post Session:  0   /10  Medications Last Reviewed:  2022  Updated Objective Findings:      Short-Term Functional Goals: Time Frame: 6 weeks  1. Patient will be independent with HEP. MET  2. Patient will demonstrate sufficient healing to progress to WBAT. MET  3. Patient will demonstrate symmetric step length with gait to improve stability during community mobility. IMPROVING  Discharge Goals: Time Frame: 16 weeks ONGOING  1. Patient will demonstrate symmetric single leg hop and stick to restore dynamic stability for return to prior level of function. 2. Patient will demonstrate >90% symmetric on isokinetic knee extension and flexion testing to normalize strength for return to prior level of function.    3. Patient will demonstrate 5/5 strength with single leg squat to restore limb strength for return to prior level of function. 6/9/22:   Hip flexion: 90 ° with normal end feel  Hip extension: 15 °   Hip IR: 30 °   Hip abduction: 4/5   Hip extension: 4/5       Treatment   TREATMENT:   THERAPEUTIC ACTIVITY: ( see below for minutes): Therapeutic activities per grid below to improve mobility, strength, balance and coordination. Required minimal visual, verbal, manual and tactile cues to improve independence and safety with daily activities . THERAPEUTIC EXERCISE: (see below for minutes): Exercises per grid below to improve mobility, strength, balance and coordination. Required minimal verbal and manual cues to promote proper body alignment, promote proper body posture and promote proper body mechanics. Progressed resistance, range, repetitions and complexity of movement as indicated. MANUAL THERAPY: (see below for minutes): Joint mobilization and Soft tissue mobilization was utilized and necessary because of the patient's restricted joint motion, painful spasm, loss of articular motion and restricted motion of soft tissue. MODALITIES: (see below for minutes): to decrease pain   SELF CARE: (see below for minutes): Procedure(s) (per grid) utilized to improve and/or restore self-care/home management as related to dressing, bathing and grooming.  Required minimal verbal cueing to facilitate activities of daily living skills and compensatory activities      Date: 5/31/22 (visit 6) 6/5/22 (visit 7) 6/8/22 (visit 8) 6/9/22 (visit 9) Progress Note 6/14/22 (visit 10)    Modalities: 10 min  10 min  10 min  10 min  10 min    Ice repeat repeat repeat 10 min to anterior hip 10 min to anterior hip                      Therapeutic Exercise: 45 min  40  Min  45 min  45 min  40 min     Bike x 6 min  Stool IR rotations 40x  Jhon stretch [id number]  Bridge 40x  Isometrics: hip adduction, abduction, extension [id number]         melissa curl ups 40x  Pelvic tilts 40x  Prone hip extension 40x        Bike  6 min  6 min  6 min  6 min    Stretching  Therapist assisted cornell stretch [id number], prone hip IR [id number], prone quad stretch [id number] repeat Therapist assisted cornell stretch at side of table [id number]  Prone hip IR stretch [id number] therapist assisted  Prone quad stretch 3x15sex therapist assisted Therapist assisted cornell stretch at side of table [id number]  Prone hip IR stretch [id number] therapist assisted  Prone quad stretch 6m64ory therapist assisted    Active warm up  Glute bridge [id number]  SL reverse clamshell 3x10  SL hip abduction 3x10 glute bridge [id number], with kick out 2x10  SL reverse clamshell 3x15  SL hip abduction 3x15 glute bridge 10x, bridge with kick out 2x10 with 5 sec hold  sidelying reverse clamshell 3x15  sidelying hip abduction 3x15 glute bridge 10x, bridge with kick out 2x10 with 5 sec hold  sidelying reverse clamshell 3x15  sidelying hip abduction 3x15    Prone hip extension  Cueing glute activation 4x10   -    Incline board  Calf stretch x1 min   Heel raise 3x20 Calf stretch 1 min  Calf stretch 1 min  Calf raise 3x15 Calf raise 3x15    Step ups   Forward 10\" 30x  Lateral 10\" 20x Forward 10\" 30x  Lateral 10\" 30x Forward 10\" 30x  Lateral 10\" 30x    Squat   1/4 squat 3x10  1/4 squat 3x10  -    Reverse hyperextensions    At edge of table 3x10 -             Proprioceptive Activities:                                    Manual Therapy:                           Functional Activities:                                                 Treatment/Session Summary:    · Treatment Assessment:   No increased pain with activity. · Communication/Consultation:  None today  · Equipment provided today:  None  · Recommendations/Intent for next treatment session: Next visit will focus on progression of strength and return to prior level of function.     Total Treatment Billable Duration:   40 minutes  Time In: 3208  Time Out: Barry Lloyd Post Session Pain  Charge Capture  [facility] Portal  MD Guidelines  Scanned Media  Benefits  MyChart No

## 2023-11-04 NOTE — ED PROVIDER NOTE - CLINICAL SUMMARY MEDICAL DECISION MAKING FREE TEXT BOX
37 y/o female hx hysterectomy p/w acute llq pain. has had some improvement, mild ttp. comfortable appearing (was sleeping when I appproached bed). will check labs, imaging, pain control, reassess

## 2023-11-06 LAB
CULTURE RESULTS: ABNORMAL
CULTURE RESULTS: ABNORMAL
SPECIMEN SOURCE: SIGNIFICANT CHANGE UP
SPECIMEN SOURCE: SIGNIFICANT CHANGE UP

## 2024-07-08 ENCOUNTER — NON-APPOINTMENT (OUTPATIENT)
Age: 39
End: 2024-07-08

## 2024-08-04 ENCOUNTER — NON-APPOINTMENT (OUTPATIENT)
Age: 39
End: 2024-08-04

## 2024-08-07 ENCOUNTER — NON-APPOINTMENT (OUTPATIENT)
Age: 39
End: 2024-08-07

## 2024-08-13 ENCOUNTER — EMERGENCY (EMERGENCY)
Facility: HOSPITAL | Age: 39
LOS: 1 days | Discharge: HOME | End: 2024-08-13
Attending: EMERGENCY MEDICINE
Payer: COMMERCIAL

## 2024-08-13 VITALS
DIASTOLIC BLOOD PRESSURE: 82 MMHG | TEMPERATURE: 98 F | OXYGEN SATURATION: 98 % | WEIGHT: 119.93 LBS | HEART RATE: 69 BPM | RESPIRATION RATE: 18 BRPM | SYSTOLIC BLOOD PRESSURE: 122 MMHG

## 2024-08-13 DIAGNOSIS — Z98.890 OTHER SPECIFIED POSTPROCEDURAL STATES: Chronic | ICD-10-CM

## 2024-08-13 DIAGNOSIS — Z98.891 HISTORY OF UTERINE SCAR FROM PREVIOUS SURGERY: Chronic | ICD-10-CM

## 2024-08-13 PROCEDURE — 99285 EMERGENCY DEPT VISIT HI MDM: CPT

## 2024-08-13 RX ORDER — BACTERIOSTATIC SODIUM CHLORIDE 0.9 %
1000 VIAL (ML) INJECTION ONCE
Refills: 0 | Status: COMPLETED | OUTPATIENT
Start: 2024-08-13 | End: 2024-08-13

## 2024-08-13 NOTE — ED PROVIDER NOTE - PHYSICAL EXAMINATION
Dr. Rodrigues: See attending attestation. Dr. Rodrigues: See attending attestation.    Gen: No acute distress, non toxic  HEENT: Mucous membranes moist, pink conjunctivae, EOMI  CV: RRR, nl s1/s2.  Resp: CTAB, normal rate and effort  GI: Abdomen soft, mild epigastric/RUQ tenderness   : No CVAT  Neuro: A&O x 3, moving all 4 extremities  MSK: No spine or joint tenderness to palpation  Skin: No rashes. intact and perfused.

## 2024-08-13 NOTE — ED PROVIDER NOTE - CLINICAL SUMMARY MEDICAL DECISION MAKING FREE TEXT BOX
Dr. Rodrigues: See attending attestation. Dr. Rodrigues: See attending attestation.    40 yo F no pmh presents c/o abdominal pain. Pt notes 8/5 noticed hematuria, also with some dysuria. went to urgent care dx with UTI, started on nitrofurantoin x 7 days. in the meantime while on abx developed fevers for at least 5 days tmax 102F. pt completed at least 6 days of the abx. notes over the past 2-3 days having upper abdominal  pain/discomfort and bloating. returned to urgent care today and had labs done was told WBC 3.7 and elevated LFTs adivsed to come to ER. afebrile in ED, non toxic appearing, abdomen soft with epigastric/ruq tenderness.     Labs in ED with elevated LFTs. US showing edematous gallbladder wall thickening. Surgery consulted. Dr. Rodrigues: See attending attestation.    40 yo F no pmh presents c/o abdominal pain. Pt notes 8/5 noticed hematuria, also with some dysuria. went to urgent care dx with UTI, started on nitrofurantoin x 7 days. in the meantime while on abx developed fevers for at least 5 days tmax 102F. pt completed at least 6 days of the abx. notes over the past 2-3 days having upper abdominal  pain/discomfort and bloating. returned to urgent care today and had labs done was told WBC 3.7 and elevated LFTs adivsed to come to ER. afebrile in ED, non toxic appearing, abdomen soft with epigastric/ruq tenderness.     Labs in ED with elevated LFTs. US showing edematous gallbladder wall thickening. Surgery consulted. Recommending HIDA. Will place on obs

## 2024-08-13 NOTE — ED PROVIDER NOTE - OBJECTIVE STATEMENT
Dr. Rodrigues: See attending attestation. Dr. Rodrigues: See attending attestation.    38 yo F no pmh presents c/o abdominal pain. Pt notes 8/5 noticed hematuria, also with some dysuria. went to urgent care dx with UTI, started on nitrofurantoin x 7 days. in the meantime while on abx developed fevers for at least 5 days tmax 102F. had returned to urgent care where covid swab was negative in the intermim. pt completed at least 6 days of the abx. notes over the past 2-3 days having upper abdominal  pain/discomfort and bloating. returned to urgent care today and had labs done was told WBC 3.7 and elevated LFTs. was scheduled for CT tomorrow but advised to come to ER instead. states fevers have resolved as of today. mostly has taken ibuprofen for her fevers, less often took tylenol. denies any recent travel, denies rashes, sick contacts, denies fever/chills, n/v/d.

## 2024-08-13 NOTE — ED PROVIDER NOTE - ATTENDING APP SHARED VISIT CONTRIBUTION OF CARE
I have personally performed a history and physical examination of the patient and discussed management with the ADRIANA as well as the patient.  I reviewed the ADRIANA's note and agree with the documented findings and plan of care.  I have authored and modified critical sections of the Provider Note, including but not limited to HPI, Physical Exam and MDM.    HPI: 39-year-old female denies past medical history presents for abdominal pain with associated distention that began last night and found to have abnormal labs in urgent care including LFTs and alk phos which were reported as elevated.  Pain localized to the epigastric abdomen without radiation.  No palliative or provocative factors.  No associated nausea, vomiting, diarrhea, fever, chills, sick contacts.  No other current complaints this time.    ROS:   General: No fever, no chills, no malaise, no fatigue  Respiratory: No cough, no dyspnea, no pleuritic chest pain  Cardiac: no chest pain, no palpitations, no edema, no jvd  Abdomen: See HPI  : No dysuria, no increase frequency, no urgency, No discharge  Musculoskeletal: No myalgia, no arthralgia  Neurologic: No headache, no vertigo, no paresthesia, no focal deficits  Skin: No rash, no evidence of trauma  All other ROS are negative    PE:  General: NAD; well appearing; A&O x3   Head: NC/AT  Eyes: PERRL, EOMI  ENT: Airway patent, mmm  Abdomen: +BS, ND, epigastric TTP, soft, no guarding, no rebound, no masses , no rigidity, no organomegaly  : No CVA TTP, no suprapubic TTP  Back: Normal  spine  Extremities: FROM, symmetric pulses  Skin: no rash or bruising  Neurologic: alert, speech clear, no focal deficits  Psych: nl mood/affect, nl insight.    MDM: 39-year-old female denies past medical history presents for abdominal pain with associated distention that began last night and found to have abnormal labs in urgent care including LFTs and alk phos which were reported as elevated.  Consider biliary disease versus pancreatitis versus gastritis.  Consider pregnancy versus ectopic pregnancy.  Will obtain CBC, CMP, hCG, UA, UC, lipase, US RUQ abdomen.  Consider CT pending results however the lower likelihood of diagnostic yield at this time given benign abdominal examination.  Symptomatic control as needed and disposition pending results. I have personally performed a history and physical examination of the patient and discussed management with the ADRIANA as well as the patient.  I reviewed the ADRIANA's note and agree with the documented findings and plan of care.  I have authored and modified critical sections of the Provider Note, including but not limited to HPI, Physical Exam and MDM.    HPI: 39-year-old female denies past medical history presents for abdominal pain with associated distention that began last night and found to have abnormal labs in urgent care including LFTs and alk phos which were reported as elevated.  Pain localized to the epigastric abdomen without radiation.  No palliative or provocative factors.  No associated nausea, vomiting, diarrhea, fever, chills, sick contacts.  No other current complaints this time.    ROS:   General: No fever, no chills, no malaise, no fatigue  Respiratory: No cough, no dyspnea, no pleuritic chest pain  Cardiac: no chest pain, no palpitations, no edema, no jvd  Abdomen: See HPI  : No dysuria, no increase frequency, no urgency, No discharge  Musculoskeletal: No myalgia, no arthralgia  Neurologic: No headache, no vertigo, no paresthesia, no focal deficits  Skin: No rash, no evidence of trauma  All other ROS are negative    PE:  General: NAD; well appearing; A&O x3   Head: NC/AT  Eyes: PERRL, EOMI  ENT: Airway patent, mmm  Abdomen: +BS, ND, epigastric TTP, soft, no guarding, no rebound, no masses , no rigidity, no organomegaly  : No CVA TTP, no suprapubic TTP  Back: Normal  spine  Extremities: FROM, symmetric pulses  Skin: no rash or bruising  Neurologic: alert, speech clear, no focal deficits  Psych: nl mood/affect, nl insight.    MDM: 39-year-old female denies past medical history presents for abdominal pain with associated distention that began last night and found to have abnormal labs in urgent care including LFTs and alk phos which were reported as elevated.  Consider biliary disease versus pancreatitis versus gastritis.  Consider pregnancy versus ectopic pregnancy.  Will obtain CBC, CMP, hCG, UA, UC, lipase, US RUQ abdomen.  Consider CT pending results however the lower likelihood of diagnostic yield at this time given benign abdominal examination.  Symptomatic control as needed. Disposition pending results.

## 2024-08-14 VITALS
RESPIRATION RATE: 18 BRPM | DIASTOLIC BLOOD PRESSURE: 70 MMHG | SYSTOLIC BLOOD PRESSURE: 104 MMHG | TEMPERATURE: 98 F | OXYGEN SATURATION: 100 % | HEART RATE: 65 BPM

## 2024-08-14 DIAGNOSIS — R74.8 ABNORMAL LEVELS OF OTHER SERUM ENZYMES: ICD-10-CM

## 2024-08-14 LAB
ALBUMIN SERPL ELPH-MCNC: 3.4 G/DL — SIGNIFICANT CHANGE UP (ref 3.3–5.2)
ALBUMIN SERPL ELPH-MCNC: 4.2 G/DL — SIGNIFICANT CHANGE UP (ref 3.3–5.2)
ALP SERPL-CCNC: 263 U/L — HIGH (ref 40–120)
ALP SERPL-CCNC: 303 U/L — HIGH (ref 40–120)
ALT FLD-CCNC: 217 U/L — HIGH
ALT FLD-CCNC: 306 U/L — HIGH
ANION GAP SERPL CALC-SCNC: 11 MMOL/L — SIGNIFICANT CHANGE UP (ref 5–17)
ANION GAP SERPL CALC-SCNC: 15 MMOL/L — SIGNIFICANT CHANGE UP (ref 5–17)
APAP SERPL-MCNC: <3 UG/ML — LOW (ref 10–26)
APPEARANCE UR: CLEAR — SIGNIFICANT CHANGE UP
AST SERPL-CCNC: 115 U/L — HIGH
AST SERPL-CCNC: 208 U/L — HIGH
BASOPHILS # BLD AUTO: 0.03 K/UL — SIGNIFICANT CHANGE UP (ref 0–0.2)
BASOPHILS NFR BLD AUTO: 0.7 % — SIGNIFICANT CHANGE UP (ref 0–2)
BILIRUB SERPL-MCNC: 0.3 MG/DL — LOW (ref 0.4–2)
BILIRUB SERPL-MCNC: <0.2 MG/DL — LOW (ref 0.4–2)
BILIRUB UR-MCNC: NEGATIVE — SIGNIFICANT CHANGE UP
BUN SERPL-MCNC: 8.6 MG/DL — SIGNIFICANT CHANGE UP (ref 8–20)
BUN SERPL-MCNC: 9.2 MG/DL — SIGNIFICANT CHANGE UP (ref 8–20)
CALCIUM SERPL-MCNC: 8.6 MG/DL — SIGNIFICANT CHANGE UP (ref 8.4–10.5)
CALCIUM SERPL-MCNC: 8.8 MG/DL — SIGNIFICANT CHANGE UP (ref 8.4–10.5)
CHLORIDE SERPL-SCNC: 102 MMOL/L — SIGNIFICANT CHANGE UP (ref 96–108)
CHLORIDE SERPL-SCNC: 103 MMOL/L — SIGNIFICANT CHANGE UP (ref 96–108)
CO2 SERPL-SCNC: 21 MMOL/L — LOW (ref 22–29)
CO2 SERPL-SCNC: 22 MMOL/L — SIGNIFICANT CHANGE UP (ref 22–29)
COLOR SPEC: YELLOW — SIGNIFICANT CHANGE UP
CREAT SERPL-MCNC: 0.41 MG/DL — LOW (ref 0.5–1.3)
CREAT SERPL-MCNC: 0.5 MG/DL — SIGNIFICANT CHANGE UP (ref 0.5–1.3)
DIFF PNL FLD: NEGATIVE — SIGNIFICANT CHANGE UP
EGFR: 123 ML/MIN/1.73M2 — SIGNIFICANT CHANGE UP
EGFR: 129 ML/MIN/1.73M2 — SIGNIFICANT CHANGE UP
EOSINOPHIL # BLD AUTO: 0.16 K/UL — SIGNIFICANT CHANGE UP (ref 0–0.5)
EOSINOPHIL NFR BLD AUTO: 3.7 % — SIGNIFICANT CHANGE UP (ref 0–6)
GLUCOSE SERPL-MCNC: 86 MG/DL — SIGNIFICANT CHANGE UP (ref 70–99)
GLUCOSE SERPL-MCNC: 97 MG/DL — SIGNIFICANT CHANGE UP (ref 70–99)
GLUCOSE UR QL: NEGATIVE MG/DL — SIGNIFICANT CHANGE UP
HCT VFR BLD CALC: 35.6 % — SIGNIFICANT CHANGE UP (ref 34.5–45)
HETEROPH AB TITR SER AGGL: NEGATIVE — SIGNIFICANT CHANGE UP
HGB BLD-MCNC: 11.8 G/DL — SIGNIFICANT CHANGE UP (ref 11.5–15.5)
IMM GRANULOCYTES NFR BLD AUTO: 0 % — SIGNIFICANT CHANGE UP (ref 0–0.9)
KETONES UR-MCNC: 40 MG/DL
LEUKOCYTE ESTERASE UR-ACNC: NEGATIVE — SIGNIFICANT CHANGE UP
LIDOCAIN IGE QN: 16 U/L — LOW (ref 22–51)
LYMPHOCYTES # BLD AUTO: 2.73 K/UL — SIGNIFICANT CHANGE UP (ref 1–3.3)
LYMPHOCYTES # BLD AUTO: 62.6 % — HIGH (ref 13–44)
MCHC RBC-ENTMCNC: 29.8 PG — SIGNIFICANT CHANGE UP (ref 27–34)
MCHC RBC-ENTMCNC: 33.1 GM/DL — SIGNIFICANT CHANGE UP (ref 32–36)
MCV RBC AUTO: 89.9 FL — SIGNIFICANT CHANGE UP (ref 80–100)
MONOCYTES # BLD AUTO: 0.39 K/UL — SIGNIFICANT CHANGE UP (ref 0–0.9)
MONOCYTES NFR BLD AUTO: 8.9 % — SIGNIFICANT CHANGE UP (ref 2–14)
NEUTROPHILS # BLD AUTO: 1.05 K/UL — LOW (ref 1.8–7.4)
NEUTROPHILS NFR BLD AUTO: 24.1 % — LOW (ref 43–77)
NITRITE UR-MCNC: NEGATIVE — SIGNIFICANT CHANGE UP
PH UR: 6 — SIGNIFICANT CHANGE UP (ref 5–8)
PLATELET # BLD AUTO: 198 K/UL — SIGNIFICANT CHANGE UP (ref 150–400)
POTASSIUM SERPL-MCNC: 3.9 MMOL/L — SIGNIFICANT CHANGE UP (ref 3.5–5.3)
POTASSIUM SERPL-MCNC: 5.1 MMOL/L — SIGNIFICANT CHANGE UP (ref 3.5–5.3)
POTASSIUM SERPL-SCNC: 3.9 MMOL/L — SIGNIFICANT CHANGE UP (ref 3.5–5.3)
POTASSIUM SERPL-SCNC: 5.1 MMOL/L — SIGNIFICANT CHANGE UP (ref 3.5–5.3)
PROT SERPL-MCNC: 6 G/DL — LOW (ref 6.6–8.7)
PROT SERPL-MCNC: 6.9 G/DL — SIGNIFICANT CHANGE UP (ref 6.6–8.7)
PROT UR-MCNC: SIGNIFICANT CHANGE UP MG/DL
RBC # BLD: 3.96 M/UL — SIGNIFICANT CHANGE UP (ref 3.8–5.2)
RBC # FLD: 12.2 % — SIGNIFICANT CHANGE UP (ref 10.3–14.5)
SODIUM SERPL-SCNC: 135 MMOL/L — SIGNIFICANT CHANGE UP (ref 135–145)
SODIUM SERPL-SCNC: 139 MMOL/L — SIGNIFICANT CHANGE UP (ref 135–145)
SP GR SPEC: 1.02 — SIGNIFICANT CHANGE UP (ref 1–1.03)
UROBILINOGEN FLD QL: 1 MG/DL — SIGNIFICANT CHANGE UP (ref 0.2–1)
WBC # BLD: 4.36 K/UL — SIGNIFICANT CHANGE UP (ref 3.8–10.5)
WBC # FLD AUTO: 4.36 K/UL — SIGNIFICANT CHANGE UP (ref 3.8–10.5)

## 2024-08-14 PROCEDURE — 80074 ACUTE HEPATITIS PANEL: CPT

## 2024-08-14 PROCEDURE — 76705 ECHO EXAM OF ABDOMEN: CPT | Mod: 26

## 2024-08-14 PROCEDURE — 74177 CT ABD & PELVIS W/CONTRAST: CPT | Mod: MC

## 2024-08-14 PROCEDURE — 86308 HETEROPHILE ANTIBODY SCREEN: CPT

## 2024-08-14 PROCEDURE — 76705 ECHO EXAM OF ABDOMEN: CPT

## 2024-08-14 PROCEDURE — 86376 MICROSOMAL ANTIBODY EACH: CPT

## 2024-08-14 PROCEDURE — 99236 HOSP IP/OBS SAME DATE HI 85: CPT

## 2024-08-14 PROCEDURE — 87086 URINE CULTURE/COLONY COUNT: CPT

## 2024-08-14 PROCEDURE — 99284 EMERGENCY DEPT VISIT MOD MDM: CPT | Mod: 25

## 2024-08-14 PROCEDURE — 80307 DRUG TEST PRSMV CHEM ANLYZR: CPT

## 2024-08-14 PROCEDURE — 80053 COMPREHEN METABOLIC PANEL: CPT

## 2024-08-14 PROCEDURE — 83690 ASSAY OF LIPASE: CPT

## 2024-08-14 PROCEDURE — 96374 THER/PROPH/DIAG INJ IV PUSH: CPT

## 2024-08-14 PROCEDURE — 85025 COMPLETE CBC W/AUTO DIFF WBC: CPT

## 2024-08-14 PROCEDURE — 84702 CHORIONIC GONADOTROPIN TEST: CPT

## 2024-08-14 PROCEDURE — 99223 1ST HOSP IP/OBS HIGH 75: CPT

## 2024-08-14 PROCEDURE — 86038 ANTINUCLEAR ANTIBODIES: CPT

## 2024-08-14 PROCEDURE — 36415 COLL VENOUS BLD VENIPUNCTURE: CPT

## 2024-08-14 PROCEDURE — 87799 DETECT AGENT NOS DNA QUANT: CPT

## 2024-08-14 PROCEDURE — 86255 FLUORESCENT ANTIBODY SCREEN: CPT

## 2024-08-14 PROCEDURE — 74177 CT ABD & PELVIS W/CONTRAST: CPT | Mod: 26,MC

## 2024-08-14 PROCEDURE — 78226 HEPATOBILIARY SYSTEM IMAGING: CPT | Mod: 26,MC

## 2024-08-14 PROCEDURE — A9537: CPT

## 2024-08-14 PROCEDURE — 81003 URINALYSIS AUTO W/O SCOPE: CPT

## 2024-08-14 PROCEDURE — 78226 HEPATOBILIARY SYSTEM IMAGING: CPT | Mod: MC

## 2024-08-14 PROCEDURE — G0378: CPT

## 2024-08-14 RX ORDER — KETOROLAC TROMETHAMINE 10 MG
30 TABLET ORAL EVERY 6 HOURS
Refills: 0 | Status: DISCONTINUED | OUTPATIENT
Start: 2024-08-14 | End: 2024-08-14

## 2024-08-14 RX ORDER — BACTERIOSTATIC SODIUM CHLORIDE 0.9 %
1000 VIAL (ML) INJECTION
Refills: 0 | Status: DISCONTINUED | OUTPATIENT
Start: 2024-08-14 | End: 2024-08-21

## 2024-08-14 RX ADMIN — Medication 1000 MILLILITER(S): at 00:53

## 2024-08-14 RX ADMIN — Medication 125 MILLILITER(S): at 06:31

## 2024-08-14 RX ADMIN — Medication 30 MILLIGRAM(S): at 19:46

## 2024-08-14 NOTE — CONSULT NOTE ADULT - SUBJECTIVE AND OBJECTIVE BOX
GENERAL SURGERY CONSULT     HPI: 39y Female PMH hypothyroidism presenting to ED after outpatient labs showed transaminitis. She states she began having vague malaise and fever 5 days ago, presented to an urgent care where RVP and covid tests were negative. She had abdominal pain/discomfort starting 3 days ago. Presented again to urgent care, where labs demonstrated transaminitis for which she was recommended to come to the ED. Labs in the ED showed the same. RUQ US demonstrated GB with thickened wall to 7.8mm but no stones, no pericholecystic fluid, no CBD dilatation. T bili normal. General Surgery consulted to rule out cholecystitis. She denies use of excessive tylenol.    Patient denies chills, denies lightheadedness/dizziness, denies SOB/chest pain, denies nausea/vomiting, denies constipation/diarrhea.    ROS: 10-system review is otherwise negative except HPI above.      PAST MEDICAL & SURGICAL HISTORY:  Knee pain  cartilage with dead bone formation  8/15/22- resolved      Hypothyroidism      COVID-19  4-21      Anemia  with pregnancy      Placenta accreta      H/O arthroscopy of right knee        History of D&C      History of  delivery  2020    History of /hysterectomy        FAMILY HISTORY:    Family history not pertinent as reviewed with the patient.    SOCIAL HISTORY:  Denies any toxic habits    ALLERGIES: NKA No Known Allergies      HOME MEDICATIONS:  Home Medications:  ferrous sulfate 325 mg (65 mg elemental iron) oral tablet: 1 tab(s) orally once a day (27 Oct 2023 14:30)  folic acid 1 mg oral tablet: 1 tab(s) orally once a day (27 Oct 2023 14:30)  ibuprofen 600 mg oral tablet: 1 tab(s) orally every 6 hours, As Needed (27 Oct 2023 14:30)  Synthroid 50 mcg (0.05 mg) oral tablet: 1 tab(s) orally once a day (27 Oct 2023 14:30)      --------------------------------------------------------------------------------------------  VITALS:  T(C): 36.4 (08-14-24 @ 05:55), Max: 36.8 (24 @ 20:24)  HR: 66 (24 @ 05:55) (66 - 69)  BP: 114/72 (24 @ 05:55) (114/72 - 122/82)  RR: 18 (24 @ 05:55) (18 - 18)  SpO2: 100% (24 @ 05:55) (98% - 100%)      PHYSICAL EXAM:   General: NAD, lying in bed comfortably  Neuro: A+Ox3  HEENT: extraocular eye movements grossly intact, MMM  Cardio: RRR  Resp: Non labored breathing on RA  GI/Abd: Soft, very mildly tender, mildly distended, no rebound/guarding, no masses palpated  Musculoskeletal: All 4 extremities moving spontaneously, no limitations, no spinal tenderness  --------------------------------------------------------------------------------------------    LABS                 11.8   4.36   )----------(  198       ( 14 Aug 2024 00:46 )               35.6      139    |  102    |  8.6    ----------------------------<  86         ( 14 Aug 2024 00:46 )  3.9     |  22.0   |  0.41     Ca    8.8        ( 14 Aug 2024 00:46 )    TPro  6.9    /  Alb  4.2    /  TBili  0.3    /  DBili  x      /  AST  208    /  ALT  306    /  AlkPhos  303    ( 14 Aug 2024 00:46 )    LIVER FUNCTIONS - ( 14 Aug 2024 00:46 )  Alb: 4.2 g/dL / Pro: 6.9 g/dL / ALK PHOS: 303 U/L / ALT: 306 U/L / AST: 208 U/L / GGT: x               CAPILLARY BLOOD GLUCOSE        Urinalysis Basic - ( 14 Aug 2024 01:23 )    Color: Yellow / Appearance: Clear / S.024 / pH: x  Gluc: x / Ketone: 40 mg/dL  / Bili: Negative / Urobili: 1.0 mg/dL   Blood: x / Protein: Trace mg/dL / Nitrite: Negative   Leuk Esterase: Negative / RBC: x / WBC x   Sq Epi: x / Non Sq Epi: x / Bacteria: x          --------------------------------------------------------------------------------------------  IMAGING  EXAM: US ABDOMEN RT UPR QUADRANT ORDERED BY: PHYLLIS LAND    PROCEDURE DATE: 2024        INTERPRETATION: CLINICAL INFORMATION: Right upper quadrant pain, elevated LFTs.    COMPARISON: 2023 CT    TECHNIQUE: Sonography of the right upper quadrant.    FINDINGS:  Liver: Within normal limits.  Bile ducts: Normal caliber. Common bile duct measures 3 mm.  Gallbladder: No cholelithiasis. Edematous wall thickening up to 8 mm. Negative sonographic Argueta sign. No pericholecystic fluid.  Pancreas: Visualized portions are within normal limits.  Right kidney: 10.7 cm. No hydronephrosis.  Ascites: None.  IVC: Visualized portions are within normal limits.    IMPRESSION:  Edematous gallbladder wall thickening but no gallstones, pericholecystic fluid or sonographic Argueta sign identified. Findings are indeterminate.        --- End of Report ---      ASSESSMENT: Patient is a 39y female with 3 days of abdominal discomfort, elevated liver enzymes and thickened gallbladder wall on RUQ US. No other evidence of acute cholecystitis, no stones, denies history of biliary colic type symptoms.    On interview, patient also stated that she was beginning to feel lower abdominal pain/discomfort.    PLAN:    - No indication for acute surgical intervention at this time  - Recommend HIDA scan to rule out acute cholecystitis  - Can consider GI consult vs. outpt follow up for elevated LFTs if HIDA negative    Patient and plan discussed with attending, Dr. Alma Calle MD

## 2024-08-14 NOTE — ED ADULT NURSE REASSESSMENT NOTE - NS ED NURSE REASSESS COMMENT FT1
GI at bedside.
Pt received from day RN CF. Pt is resting in stretcher comfortably at this time, no apparent distress noted at this time. Pt safety maintained. Pt denies any complaints at this time. Respirations even & unlabored. Pt made aware of plan of care and verbalized understanding. Pending Gastro consult.
Pt resting in bed, respirations are equal and unlabored on room air. Pt c/o mild ABD pain at this time denies wanting medication. Blood work sent at this time. Pt left in position of comfort, wheels of stretcher locked and in the lowest position. Family at bedside.
Report received by RN Junior Tucker, care assumed at this time. Pt A+Ox4, respirations equal and unlabored at this time on room air. Pt denies pain at this time. Pt left in position of comfort, wheels of stretcher locked and in the lowest position. Call bell within reach.
Report given to RADHA Rogers. Pt moved to A6R. Patient awake and alert, respirations even and unlabored, in no apparent distress.  Plan, abnormal labs, history of present illness, pending labs/tests explained, opportunity to answer questions provided.

## 2024-08-14 NOTE — CONSULT NOTE ADULT - PROBLEM SELECTOR RECOMMENDATION 9
Most likley DILI from nitrofurantoin  Alk phos 308; AST//306   U/S my interpretation- thickened Gb wall, normal CBD  CT pending - evaluate etiology of abdominal pain   LFT ordered for tomorrow   liver serologies ordered    at bedside and contributed to history  HIDA negative
Other (Free Text): Plan Tacrolimus if no improvement with ciclopirox gel
Note Text (......Xxx Chief Complaint.): This diagnosis correlates with the
Render Risk Assessment In Note?: no
Detail Level: Simple

## 2024-08-14 NOTE — ED CDU PROVIDER INITIAL DAY NOTE - PROGRESS NOTE DETAILS
pt reporting fevers 102/103 last week with abdominal distention to the lower abdomen, 5lb weight gain, normal bowel movements, was taking minimal tylenol for fever control, no recent travel, no diarrhea. pt found to have edematous GB seen by surgery, HIDA completed and pending reading as well as GI consultation.       Gen: Well appearing in NAD  Head: NC/AT  Neck: trachea midline  Resp:  No distress  ABD soft nd + epigastric RUQ tenderness.  Ext: no deformities  Neuro:  A&O appears non focal  Skin:  Warm and dry as visualized  Psych:  Normal affect and mood

## 2024-08-14 NOTE — ED CDU PROVIDER DISPOSITION NOTE - ATTENDING CONTRIBUTION TO CARE
I, Antonio Lara, performed the initial face to face bedside interview with this patient regarding history of present illness, review of symptoms and relevant past medical, social and family history.  I completed an independent physical examination.  I was the initial provider who evaluated this patient. I have signed out the follow up of any pending tests (i.e. labs, radiological studies) to the ACP.  I have communicated the patient’s plan of care and disposition with the ACP.

## 2024-08-14 NOTE — ED CDU PROVIDER DISPOSITION NOTE - CLINICAL COURSE
40 yo F no pmh presents c/o abdominal pain. Pt notes 8/5 noticed hematuria, also with some dysuria. went to urgent care dx with UTI, started on nitrofurantoin x 7 days. in the meantime while on abx developed fevers for at least 5 days tmax 102F. pt completed at least 6 days of the abx. notes over the past 2-3 days having upper abdominal  pain/discomfort and bloating. returned to urgent care had labs done was told WBC 3.7 and elevated LFTs advised to come to ER. afebrile in ED, non toxic appearing, abdomen soft with epigastric/ruq tenderness.   Labs in ED with elevated LFTs. US showing edematous gallbladder wall thickening. Surgery consulted. Recommending HIDA which was negative. CT abd/pelvis showing periportal edema and nonspecific gallbladder wall edema. findings are nonspecific but can be seen in the context of hepatitis and/or   inflammatory process in the abdomen or pelvis. In the given context, acute hepatitis is the favored differential diagnosis. Pt was seen by GI, likely DILI from nitrofurantoin. Recommended trend CMP daily.  Pt requesting to go home. Repeat labs tonight with slightly downtrending levels. Will dc with understanding pt needs close outpt f/u hepatology for further management. advised hepatitis panel and additional labs pending at time of discharge. return precautions

## 2024-08-14 NOTE — ED CDU PROVIDER INITIAL DAY NOTE - PHYSICAL EXAMINATION
Gen: No acute distress, non toxic  HEENT: Mucous membranes moist, pink conjunctivae, EOMI  CV: RRR, nl s1/s2.  Resp: CTAB, normal rate and effort  GI: Abdomen soft, mild epigastric/RUQ tenderness   : No CVAT  Neuro: A&O x 3, moving all 4 extremities  MSK: No spine or joint tenderness to palpation  Skin: No rashes. intact and perfused.

## 2024-08-14 NOTE — CONSULT NOTE ADULT - SUBJECTIVE AND OBJECTIVE BOX
- Subjective


Encounter Date: 01/04/20 (f/u severe sepsis)


Encounter Time: 12:16


Subjective: 





Pt is c/o 10/10 pain in left lower leg and foot today.  Denies any other needs 

or changes.  He denies n/v/abd pain/diarrhea/cp/sob.





- Objective


Vital Signs & Weight: 


 Vital Signs (12 hours)











  Temp Pulse Resp BP BP BP Pulse Ox


 


 01/04/20 11:02  98.7 F  87  20   176/98 H   95


 


 01/04/20 08:00        92 L


 


 01/04/20 07:30  97.7 F  79  21 H  142/82 H    91 L


 


 01/04/20 04:47  98.5 F  94  20    163/97 H  94 L


 


 01/04/20 00:23  98.5 F  100  20    167/96 H  92 L








 Weight











Weight                         465 lb 8 oz











 Most Recent Monitor Data











Heart Rate from ECG            98


 


NIBP                           136/58


 


NIBP BP-Mean                   84


 


Respiration from ECG           23


 


SpO2                           98














Result Diagrams: 


 01/04/20 07:12





 01/04/20 07:12





Hospitalist ROS





- Medication


Medications: 


Active Medications











Generic Name Dose Route Start Last Admin





  Trade Name Freq  PRN Reason Stop Dose Admin


 


Acetaminophen  650 mg  12/31/19 02:57  01/03/20 20:25





  Tylenol  PO   650 mg





  Q4H PRN   Administration





  Headache/Fever/Mild Pain (1-3)   





     





     





     


 


Enoxaparin Sodium  40 mg  12/31/19 09:00  01/04/20 08:00





  Lovenox  SC   40 mg





  0900 PAULINO   Administration





     





     





     





     


 


Famotidine  20 mg  12/31/19 09:00  01/04/20 08:11





  Pepcid  PO   20 mg





  BID PAULINO   Administration





     





     





     





     


 


Ceftriaxone Sodium 2 gm/  100 mls @ 200 mls/hr  12/31/19 09:00  01/04/20 08:01





  Sodium Chloride  IVPB   100 mls





  DAILY PAULINO   Administration





     





     





     





     


 


Ondansetron HCl  4 mg  12/31/19 02:57  01/01/20 06:19





  Zofran Odt  PO   4 mg





  Q6H PRN   Administration





  Nausea/Vomiting   





     





     





     


 


Ondansetron HCl  4 mg  12/31/19 02:57  12/31/19 08:38





  Zofran  IVP   4 mg





  Q6H PRN   Administration





  Nausea/Vomiting   





     





     





     


 


Saccharomyces Boulardii  250 mg  12/31/19 09:00  01/04/20 08:00





  Florastor  PO   250 mg





  DAILY PAULINO   Administration





     





     





     





     


 


Sodium Chloride  10 ml  12/31/19 02:57  01/02/20 12:05





  Flush - Normal Saline  IVF   10 ml





  PRN PRN   Administration





  Saline Flush   





     





     





     


 


Tramadol HCl  100 mg  12/31/19 14:11  01/04/20 12:11





  Ultram  PO   100 mg





  Q6H PRN   Administration





  Moderate to Severe Pain (6-10)   





     





     





     














- Exam


General Appearance: NAD


Heart: RRR, no murmur


Respiratory: CTAB, no wheezes, no rales, no ronchi


Gastrointestinal: soft, normal bowel sounds


Extremities - other findings: LLE - regression of erythema, ttp distal to knee 

today (improved)


Skin - other findings: LLE edema about the same with lymphedema changes





Hosp A/P


(1) Acute on chronic respiratory failure with hypoxia and hypercapnia


Code(s): J96.21 - ACUTE AND CHRONIC RESPIRATORY FAILURE WITH HYPOXIA; J96.22 - 

ACUTE AND CHRONIC RESPIRATORY FAILURE WITH HYPERCAPNIA   Status: Acute   





(2) Cellulitis of left thigh


Code(s): L03.116 - CELLULITIS OF LEFT LOWER LIMB   Status: Acute   





(3) Chronic respiratory failure with hypercapnia


Code(s): J96.12 - CHRONIC RESPIRATORY FAILURE WITH HYPERCAPNIA   Status: 

Chronic   





(4) Obesity hypoventilation syndrome


Code(s): E66.2 - MORBID (SEVERE) OBESITY WITH ALVEOLAR HYPOVENTILATION   Status

: Chronic   





(5) Sepsis


Code(s): A41.9 - SEPSIS, UNSPECIFIED ORGANISM   Status: Resolved   


Qualifiers: 


   Sepsis type: sepsis due to unspecified organism   Acute respiratory failure 

type: with hypercapnia 





- Plan





Appreciate Pulm consult - signed off, recs on assisted ventilation for here and 

at home


Appreciate ID consult - Rocephin until clear improvement (improved today but 

still significant edema/erythema), then keflex, then penicillin daily


- erythema improved today, edema about the same.  


- consult to wound care to see if they care provide gentle compression to 

assist with the swelling


- prop leg on pillows


- continue pain management


- WBC now normal





continue furosemide to try and assist with edema - changed to PO, monitor renal 

function and potassium levels





dvt prophy - ambulatory and lovenox


gi prophy - not indicated


code status full





reviewed plan of care with patient, no questions or further needs at end of 

eval.





Note - wound care request ultrasound/doppler flow prior to evaluation - ordered Chief Complaint:  Patient is a 39y old  Female who presents with a chief complaint of abdominal pain     HPI  This is a 39 year old female with PMH of hypothyroidism who presents with abdominal pain. Patient reports last Monday symptoms began. On  patient noticed hematuria, also with some dysuria. She went to urgent care and was diagnosed with UTI, started on nitrofurantoin, which she took for about 6 days. While on antibiotics she developed fevers with a Tmax 102F starting last Wednesday. She reports alternating between Tylenol and Ibuprofen for hyperthermia. She only took two doses of Tylenol. She did a covid swab which was negative. She also reports upper abdominal discomfort/bloating over the past 3 days that have progressed to severe upper abdominal pain. She also notes she has taken this same antibiotic last year for the same issue without any issues. GI consulted for elevated liver enzymes. Alk phos 308, AST//306. RUQ U/S with edematous gallbladder wall thickening but no gallstones, pericholecystic fluid. HIDA scan normal.       PAST MEDICAL & SURGICAL HISTORY:  Knee pain  cartilage with dead bone formation  8/15/22- resolved      Hypothyroidism      COVID-19  4-21      Anemia  with pregnancy      Placenta accreta      H/O arthroscopy of right knee        History of D&C      History of  delivery  2020          REVIEW OF SYSTEMS:   General: Negative  HEENT: Negative  CV: Negative  Respiratory: Negative  GI: See HPI  : Negative  MSK: Negative  Hematologic: Negative  Skin: Negative    MEDICATIONS:   MEDICATIONS  (STANDING):  sodium chloride 0.9%. 1000 milliLiter(s) (125 mL/Hr) IV Continuous <Continuous>    MEDICATIONS  (PRN):  ketorolac   Injectable 30 milliGRAM(s) IV Push every 6 hours PRN Moderate Pain (4 - 6)          DIET:          ALLERGIES:   Allergies    No Known Allergies    Intolerances        Substance Use:   (  ) never used  (  ) other:  Tobacco Usage:  (   ) never smoked   (   ) former smoker   (   ) current smoker  (     ) pack year  (        ) last cigarette date  Alcohol Usage:    Family History   IBD (  ) Yes   (  ) No  GI Malignancy (  )  Yes    (  ) No    Health Management  Last Colonoscopy:  Last Endoscopy:     VITAL SIGNS:   Vital Signs Last 24 Hrs  T(C): 37.2 (14 Aug 2024 12:20), Max: 37.2 (14 Aug 2024 12:20)  T(F): 99 (14 Aug 2024 12:20), Max: 99 (14 Aug 2024 12:20)  HR: 68 (14 Aug 2024 12:20) (61 - 69)  BP: 110/71 (14 Aug 2024 12:20) (110/71 - 122/82)  BP(mean): 87 (14 Aug 2024 10:18) (87 - 87)  RR: 18 (14 Aug 2024 12:20) (18 - 19)  SpO2: 99% (14 Aug 2024 12:20) (98% - 100%)    Parameters below as of 14 Aug 2024 12:20  Patient On (Oxygen Delivery Method): room air      I&O's Summary      PHYSICAL EXAM:   GENERAL:  No acute distress  HEENT:  NC/AT, conjunctiva clear, sclera anicteric  CHEST:  No increased effort  HEART:  Regular rate  ABDOMEN:  Soft, + tender, non-distended, normoactive bowel sounds, no rebound or guarding  EXTREMITIES: No edema  SKIN:  Warm, dry  NEURO:  Calm, cooperative    LABS:                        11.8   4.36  )-----------( 198      ( 14 Aug 2024 00:46 )             35.6     Hemoglobin: 11.8 g/dL (24 @ 00:46)        139  |  102  |  8.6  ----------------------------<  86  3.9   |  22.0  |  0.41<L>    Ca    8.8      14 Aug 2024 00:46    TPro  6.9  /  Alb  4.2  /  TBili  0.3<L>  /  DBili  x   /  AST  208<H>  /  ALT  306<H>  /  AlkPhos  303<H>      LIVER FUNCTIONS - ( 14 Aug 2024 00:46 )  Alb: 4.2 g/dL / Pro: 6.9 g/dL / ALK PHOS: 303 U/L / ALT: 306 U/L / AST: 208 U/L / GGT: x                 Lipase: 16 U/L (24 @ 00:46)          RADIOLOGY & ADDITIONAL STUDIES:      ACC: [id number] EXAM:  NM HEPATOBILIARY IMG   ORDERED BY: PHYLLIS LAND     PROCEDURE DATE:  2024          INTERPRETATION:  RADIOPHARMACEUTICAL: 3.2 mCi Tc-99m-Mebrofenin, I.V.    CLINICAL INFORMATION: 39 year old female with right upper quadrant   abdominal pain and edematous gallbladder wall thickening on ultrasound;   referred to evaluate for acute cholecystitis.    TECHNIQUE:  Dynamic imaging of the anterior abdomen was performed for 1   hour following radiopharmaceutical injection. Static images of the   abdomen in the anterior, right anterior oblique and right lateral views   were obtained immediately thereafter.    COMPARISON: None    FINDINGS: There is prompt, homogeneous uptake of radiopharmaceutical by   the hepatocytes. Activity is first seen in the gallbladder at about 15   minutes and in the bowel at about 20 minutes. There is good clearance of   activity from the liver by the end of the study.    IMPRESSION: Normal hepatobiliary scan. No radionuclide evidence of acute   cholecystitis.    --- End of Report ---  EARLENE CAMPA MD; Attending Nuclear Medicine  This document has been electronically signed. Aug 14 2024 10:41AM  24 @ 08:19    -- -- --   ACC: [id number] EXAM:  US ABDOMEN RT UPR QUADRANT   ORDERED BY: PHYLLIS LAND     PROCEDURE DATE:  2024          INTERPRETATION:  CLINICAL INFORMATION: Right upper quadrant pain,   elevated LFTs.    COMPARISON: 2023 CT    TECHNIQUE: Sonography of the right upper quadrant.    FINDINGS:  Liver: Within normal limits.  Bile ducts: Normal caliber. Common bile duct measures 3 mm.  Gallbladder: No cholelithiasis. Edematous wall thickening up to 8 mm.   Negative sonographic Argueta sign. No pericholecystic fluid.  Pancreas: Visualized portions are within normal limits.  Right kidney: 10.7 cm. No hydronephrosis.  Ascites: None.  IVC: Visualized portions are within normal limits.    IMPRESSION:  Edematous gallbladder wall thickening but no gallstones, pericholecystic   fluid or sonographic Argueta sign identified. Findings are indeterminate.        --- End of Report ---      RENÉ RAMOS MD; Attending Radiologist  This document has been electronically signed. Aug 14 2024  2:56AM     Chief Complaint:  Patient is a 39y old  Female who presents with a chief complaint of abdominal pain     HPI  This is a 39 year old female with PMH of hypothyroidism who presents with abdominal pain. Patient reports last Monday symptoms began. On  patient noticed hematuria, also with some dysuria. She went to urgent care and was diagnosed with UTI, started on nitrofurantoin, which she took for about 6 days. While on antibiotics she developed fevers with a Tmax 102F starting last Wednesday. She reports alternating between Tylenol and Ibuprofen for hyperthermia. She only took two doses of Tylenol. She did a covid swab which was negative. She also reports upper abdominal discomfort/bloating over the past 3 days that have progressed to severe upper abdominal pain. She also notes she has taken this same antibiotic last year for the same issue without any issues. GI consulted for elevated liver enzymes. Alk phos 308, AST//306. RUQ U/S with edematous gallbladder wall thickening but no gallstones, pericholecystic fluid. HIDA scan normal.  NO hx of ETOH , no  IVDA, no family hx of liver disease, no hx of blood transfusions befor        PAST MEDICAL & SURGICAL HISTORY:  Knee pain  cartilage with dead bone formation  8/15/22- resolved      Hypothyroidism      COVID-19  4-21      Anemia  with pregnancy      Placenta accreta      H/O arthroscopy of right knee        History of D&C      History of  delivery  2020          REVIEW OF SYSTEMS:   General: Negative  HEENT: Negative  CV: Negative  Respiratory: Negative  GI: See HPI  : Negative  MSK: Negative  Hematologic: Negative  Skin: Negative    MEDICATIONS:   MEDICATIONS  (STANDING):  sodium chloride 0.9%. 1000 milliLiter(s) (125 mL/Hr) IV Continuous <Continuous>    MEDICATIONS  (PRN):  ketorolac   Injectable 30 milliGRAM(s) IV Push every 6 hours PRN Moderate Pain (4 - 6)          DIET:          ALLERGIES:   Allergies    No Known Allergies    Intolerances        Substance Use:   (  ) never used  (  ) other:  Tobacco Usage:  (   ) never smoked   (   ) former smoker   (   ) current smoker  (     ) pack year  (        ) last cigarette date  Alcohol Usage:    Family History   IBD (  ) Yes   (  ) No  GI Malignancy (  )  Yes    (  ) No    Health Management  Last Colonoscopy:  Last Endoscopy:     VITAL SIGNS:   Vital Signs Last 24 Hrs  T(C): 37.2 (14 Aug 2024 12:20), Max: 37.2 (14 Aug 2024 12:20)  T(F): 99 (14 Aug 2024 12:20), Max: 99 (14 Aug 2024 12:20)  HR: 68 (14 Aug 2024 12:20) (61 - 69)  BP: 110/71 (14 Aug 2024 12:20) (110/71 - 122/82)  BP(mean): 87 (14 Aug 2024 10:18) (87 - 87)  RR: 18 (14 Aug 2024 12:20) (18 - 19)  SpO2: 99% (14 Aug 2024 12:20) (98% - 100%)    Parameters below as of 14 Aug 2024 12:20  Patient On (Oxygen Delivery Method): room air      I&O's Summary      PHYSICAL EXAM:   GENERAL:  No acute distress  HEENT:  NC/AT, conjunctiva clear, sclera anicteric  CHEST:  No increased effort  HEART:  Regular rate  ABDOMEN:  Soft, + tender upper abdomen , non-distended, normoactive bowel sounds, no rebound or guarding  EXTREMITIES: No edema  SKIN:  Warm, dry  NEURO:  Calm, cooperative    LABS:                        11.8   4.36  )-----------( 198      ( 14 Aug 2024 00:46 )             35.6     Hemoglobin: 11.8 g/dL (24 @ 00:46)        139  |  102  |  8.6  ----------------------------<  86  3.9   |  22.0  |  0.41<L>    Ca    8.8      14 Aug 2024 00:46    TPro  6.9  /  Alb  4.2  /  TBili  0.3<L>  /  DBili  x   /  AST  208<H>  /  ALT  306<H>  /  AlkPhos  303<H>      LIVER FUNCTIONS - ( 14 Aug 2024 00:46 )  Alb: 4.2 g/dL / Pro: 6.9 g/dL / ALK PHOS: 303 U/L / ALT: 306 U/L / AST: 208 U/L / GGT: x                 Lipase: 16 U/L (24 @ 00:46)          RADIOLOGY & ADDITIONAL STUDIES:      ACC: [id number] EXAM:  NM HEPATOBILIARY IMG   ORDERED BY: PHYLLIS LAND     PROCEDURE DATE:  2024          INTERPRETATION:  RADIOPHARMACEUTICAL: 3.2 mCi Tc-99m-Mebrofenin, I.V.    CLINICAL INFORMATION: 39 year old female with right upper quadrant   abdominal pain and edematous gallbladder wall thickening on ultrasound;   referred to evaluate for acute cholecystitis.    TECHNIQUE:  Dynamic imaging of the anterior abdomen was performed for 1   hour following radiopharmaceutical injection. Static images of the   abdomen in the anterior, right anterior oblique and right lateral views   were obtained immediately thereafter.    COMPARISON: None    FINDINGS: There is prompt, homogeneous uptake of radiopharmaceutical by   the hepatocytes. Activity is first seen in the gallbladder at about 15   minutes and in the bowel at about 20 minutes. There is good clearance of   activity from the liver by the end of the study.    IMPRESSION: Normal hepatobiliary scan. No radionuclide evidence of acute   cholecystitis.    --- End of Report ---  EARLENE CAMPA MD; Attending Nuclear Medicine  This document has been electronically signed. Aug 14 2024 10:41AM  24 @ 08:19    -- -- --   ACC: [id number] EXAM:  US ABDOMEN RT UPR QUADRANT   ORDERED BY: PHYLLIS LAND     PROCEDURE DATE:  2024          INTERPRETATION:  CLINICAL INFORMATION: Right upper quadrant pain,   elevated LFTs.    COMPARISON: 2023 CT    TECHNIQUE: Sonography of the right upper quadrant.    FINDINGS:  Liver: Within normal limits.  Bile ducts: Normal caliber. Common bile duct measures 3 mm.  Gallbladder: No cholelithiasis. Edematous wall thickening up to 8 mm.   Negative sonographic Argueta sign. No pericholecystic fluid.  Pancreas: Visualized portions are within normal limits.  Right kidney: 10.7 cm. No hydronephrosis.  Ascites: None.  IVC: Visualized portions are within normal limits.    IMPRESSION:  Edematous gallbladder wall thickening but no gallstones, pericholecystic   fluid or sonographic Argueta sign identified. Findings are indeterminate.        --- End of Report ---      RENÉ RAMOS MD; Attending Radiologist  This document has been electronically signed. Aug 14 2024  2:56AM

## 2024-08-14 NOTE — ED CDU PROVIDER INITIAL DAY NOTE - CLINICAL SUMMARY MEDICAL DECISION MAKING FREE TEXT BOX
38 yo F no pmh presents c/o abdominal pain. Pt notes 8/5 noticed hematuria, also with some dysuria. went to urgent care dx with UTI, started on nitrofurantoin x 7 days. in the meantime while on abx developed fevers for at least 5 days tmax 102F. pt completed at least 6 days of the abx. notes over the past 2-3 days having upper abdominal  pain/discomfort and bloating. returned to urgent care today and had labs done was told WBC 3.7 and elevated LFTs advised to come to ER. afebrile in ED, non toxic appearing, abdomen soft with epigastric/ruq tenderness.     Labs in ED with elevated LFTs. US showing edematous gallbladder wall thickening. Surgery consulted. Recommending HIDA. Will place on obs for HIDA

## 2024-08-14 NOTE — ED CDU PROVIDER INITIAL DAY NOTE - ATTENDING APP SHARED VISIT CONTRIBUTION OF CARE
I have personally performed a history and physical examination of the patient and discussed management with the ADRIANA as well as the patient.  I reviewed the ADRIANA's note and agree with the documented findings and plan of care.  I have authored and modified critical sections of the Provider Note, including but not limited to HPI, Physical Exam and MDM.    HPI: 39-year-old female denies past medical history presents for abdominal pain with associated distention that began last night and found to have abnormal labs in urgent care including LFTs and alk phos which were reported as elevated.  Pain localized to the epigastric abdomen without radiation.  No palliative or provocative factors.  No associated nausea, vomiting, diarrhea, fever, chills, sick contacts.  No other current complaints this time.    ROS:   General: No fever, no chills, no malaise, no fatigue  Respiratory: No cough, no dyspnea, no pleuritic chest pain  Cardiac: no chest pain, no palpitations, no edema, no jvd  Abdomen: See HPI  : No dysuria, no increase frequency, no urgency, No discharge  Musculoskeletal: No myalgia, no arthralgia  Neurologic: No headache, no vertigo, no paresthesia, no focal deficits  Skin: No rash, no evidence of trauma  All other ROS are negative    PE:  General: NAD; well appearing; A&O x3   Head: NC/AT  Eyes: PERRL, EOMI  ENT: Airway patent, mmm  Abdomen: +BS, ND, epigastric TTP, soft, no guarding, no rebound, no masses , no rigidity, no organomegaly  : No CVA TTP, no suprapubic TTP  Back: Normal  spine  Extremities: FROM, symmetric pulses  Skin: no rash or bruising  Neurologic: alert, speech clear, no focal deficits  Psych: nl mood/affect, nl insight.    MDM: 39-year-old female denies past medical history presents for abdominal pain with associated distention that began last night and found to have abnormal labs in urgent care including LFTs and alk phos which were reported as elevated.  Consider biliary disease versus pancreatitis versus gastritis.  Consider pregnancy versus ectopic pregnancy.  Will obtain CBC, CMP, hCG, UA, UC, lipase, US RUQ abdomen.  Consider CT pending results however the lower likelihood of diagnostic yield at this time given benign abdominal examination.  Symptomatic control as needed. Labs in ED with elevated LFTs. US showing edematous gallbladder wall thickening. Surgery consulted. Recommending HIDA. Will place on obs.

## 2024-08-14 NOTE — CONSULT NOTE ADULT - ASSESSMENT
This is a 39 year old female with PMH of hypothyroidism who presents with abdominal pain. GI consulted for elevated liver enzyme    #elevated liver enzymes  #abdominal pain  RUQ U/S (08.14.24) Edematous gallbladder wall thickening but no gallstones, pericholecystic fluid or sonographic Argueta sign identified.  NM Hepatobiliary (08.14.2024) Normal hepatobiliary scan. No radionuclide evidence of acute cholecystitis.  This is most likely DILI from nitrofurantoin use     - Alk phos 308; AST//306  - Infectious mono screen: negative   - Acute hepatitis panel: results pending   - Will order liver serologies (EBV, LAURA, smooth muscle, CMV)  - CT A/P performed; results pending  - Can advance to regular diet if CT unremarkable  - Avoid hepatotoxins  - Trend CMP daily   - Will need outpatient follow up with Dr. Snell   _________________________________________________________________  Assessment and recommendations are final when note is signed by the attending physician.   
Unknown if ever smoked

## 2024-08-14 NOTE — ED CDU PROVIDER DISPOSITION NOTE - NSFOLLOWUPINSTRUCTIONS_ED_ALL_ED_FT
Please follow up with your primary care doctor and hepatology in 1-2 days for repeat lab work  Return for any fever, worsening pain, vomiting, or other new or worsening symptoms

## 2024-08-14 NOTE — CONSULT NOTE ADULT - NS ATTEST RISK PROBLEM GEN_ALL_CORE FT
Acutely  Elevated LFT. Most likley DILI   ( side effect ) from nitrofurantoin- high level problem     Alk phos 308; AST//306- wbc 4.3   ( review of results )   U/S my interpretation- thickened Gb wall, normal CBD- (independent interpretation of test performed )  CT pending - evaluate etiology of abdominal pain   LFT ordered for tomorrow   liver serologies ordered    at bedside and contributed to history-  (help from independent historian )

## 2024-08-14 NOTE — CONSULT NOTE ADULT - NS ATTEND AMEND GEN_ALL_CORE FT
Patient with fevers, dysuria. Given nitrofurantoin by urgent care . Also took 2 doses of tylenol. Then few days later, pt with upper abdominal pain.         A- +BS. soft, tenderness on palpation of the upper abdomen       Elevated LFT. Most likley DILI from nitrofurantoin  Alk phos 308; AST//306   U/S my interpretation- thickened Gb wall, normal CBD  CT pending - evaluate etiology of abdominal pain   LFT ordered for tomorrow   liver serologies ordered    at bedside and contributed to history

## 2024-08-14 NOTE — ED CDU PROVIDER DISPOSITION NOTE - PATIENT PORTAL LINK FT
You can access the FollowMyHealth Patient Portal offered by [facility] by registering at the following website: [email or web address]. By joining [email or web address]’s FollowMyHealth portal, you will also be able to view your health information using other applications (apps) compatible with our system.

## 2024-08-14 NOTE — ED CDU PROVIDER INITIAL DAY NOTE - OBJECTIVE STATEMENT
38 yo F no pmh presents c/o abdominal pain. Pt notes 8/5 noticed hematuria, also with some dysuria. went to urgent care dx with UTI, started on nitrofurantoin x 7 days. in the meantime while on abx developed fevers for at least 5 days tmax 102F. had returned to urgent care where covid swab was negative in the intermim. pt completed at least 6 days of the abx. notes over the past 2-3 days having upper abdominal  pain/discomfort and bloating. returned to urgent care today and had labs done was told WBC 3.7 and elevated LFTs. was scheduled for CT tomorrow but advised to come to ER instead. states fevers have resolved as of today. mostly has taken ibuprofen for her fevers, less often took tylenol. denies any recent travel, denies rashes, sick contacts, denies n/v/d.

## 2024-08-14 NOTE — ED CDU PROVIDER DISPOSITION NOTE - CARE PROVIDER_API CALL
Maria De Jesus Snell  Transplant Hepatology  [address], [address]  [place] [address]  Phone: [phone]  Fax: [phone]  Follow Up Time:

## 2024-08-15 LAB
CMV DNA CSF QL NAA+PROBE: SIGNIFICANT CHANGE UP IU/ML
CMV DNA SPEC NAA+PROBE-LOG#: SIGNIFICANT CHANGE UP LOG10IU/ML
CULTURE RESULTS: NO GROWTH — SIGNIFICANT CHANGE UP
EBV DNA SERPL NAA+PROBE-ACNC: SIGNIFICANT CHANGE UP IU/ML
EBVPCR LOG: SIGNIFICANT CHANGE UP LOG10IU/ML
HAV IGM SER-ACNC: SIGNIFICANT CHANGE UP
HAV IGM SER-ACNC: SIGNIFICANT CHANGE UP
HBV CORE IGM SER-ACNC: SIGNIFICANT CHANGE UP
HBV CORE IGM SER-ACNC: SIGNIFICANT CHANGE UP
HBV SURFACE AG SER-ACNC: SIGNIFICANT CHANGE UP
HBV SURFACE AG SER-ACNC: SIGNIFICANT CHANGE UP
HCV AB S/CO SERPL IA: 0.11 S/CO — SIGNIFICANT CHANGE UP (ref 0–0.99)
HCV AB S/CO SERPL IA: 0.13 S/CO — SIGNIFICANT CHANGE UP (ref 0–0.99)
HCV AB SERPL-IMP: SIGNIFICANT CHANGE UP
HCV AB SERPL-IMP: SIGNIFICANT CHANGE UP
SPECIMEN SOURCE: SIGNIFICANT CHANGE UP

## 2024-08-17 LAB — LKM AB SER-ACNC: <20.1 UNITS — SIGNIFICANT CHANGE UP (ref 0–20)

## 2024-08-19 ENCOUNTER — APPOINTMENT (OUTPATIENT)
Dept: HEPATOLOGY | Facility: CLINIC | Age: 39
End: 2024-08-19

## 2024-08-19 LAB — ANA TITR SER: NEGATIVE — SIGNIFICANT CHANGE UP

## 2024-08-20 LAB — SMOOTH MUSCLE AB SER-ACNC: ABNORMAL

## 2024-08-21 ENCOUNTER — APPOINTMENT (OUTPATIENT)
Dept: GASTROENTEROLOGY | Facility: CLINIC | Age: 39
End: 2024-08-21
Payer: COMMERCIAL

## 2024-08-21 VITALS
HEIGHT: 63 IN | SYSTOLIC BLOOD PRESSURE: 126 MMHG | OXYGEN SATURATION: 98 % | WEIGHT: 121 LBS | BODY MASS INDEX: 21.44 KG/M2 | HEART RATE: 84 BPM | DIASTOLIC BLOOD PRESSURE: 80 MMHG | RESPIRATION RATE: 14 BRPM

## 2024-08-21 DIAGNOSIS — R79.89 OTHER SPECIFIED ABNORMAL FINDINGS OF BLOOD CHEMISTRY: ICD-10-CM

## 2024-08-21 DIAGNOSIS — Z09 ENCOUNTER FOR FOLLOW-UP EXAMINATION AFTER COMPLETED TREATMENT FOR CONDITIONS OTHER THAN MALIGNANT NEOPLASM: ICD-10-CM

## 2024-08-21 DIAGNOSIS — R74.8 ABNORMAL LEVELS OF OTHER SERUM ENZYMES: ICD-10-CM

## 2024-08-21 PROCEDURE — 99214 OFFICE O/P EST MOD 30 MIN: CPT

## 2024-08-21 NOTE — ASSESSMENT
[FreeTextEntry1] : A/P Patient with elevated liver function test after taking nitrofurantoin.  Most likely had drug-induced liver injury I have ordered a CMP.  She will call me for the result.  If still elevated will repeat CMP and consider hepatology consult Liver serologies negative CAT scan showed some periportal edema Follow-up in 3 months

## 2024-08-21 NOTE — HISTORY OF PRESENT ILLNESS
[FreeTextEntry1] : Patient with a history of hypothyroidism.  Patient was seen in the emergency room on 8/14.  Few days prior to that she was having some hematuria dysuria and fevers.  She took Tylenol and ibuprofen at home.  Went to urgent care and was thought to have a UTI.  Started nitrofurantoin.  She took nitrofurantoin for 5 days and then began to have upper abdominal discomfort and nausea.  Came to the emergency room.  Noted to have elevated liver function test.  Initial alk phos 303, repeat 263.   repeat 217.   repeat 115.  Ultrasound showed some gallbladder wall edema.  HIDA scan negative.  Patient had a CAT scan which showed periportal edema.  Her hepatitis ABC was negative LAURA and AMA SMA Raymon-Barr virus CMV negative.    Today patient is feeling much better.  Very minimal epigastric discomfort

## 2024-08-23 LAB
ALBUMIN SERPL ELPH-MCNC: 4.3 G/DL
ALP BLD-CCNC: 128 U/L
ALT SERPL-CCNC: 40 U/L
ANION GAP SERPL CALC-SCNC: 11 MMOL/L
AST SERPL-CCNC: 22 U/L
BILIRUB SERPL-MCNC: 0.6 MG/DL
BUN SERPL-MCNC: 12 MG/DL
CALCIUM SERPL-MCNC: 9.3 MG/DL
CHLORIDE SERPL-SCNC: 104 MMOL/L
CO2 SERPL-SCNC: 24 MMOL/L
CREAT SERPL-MCNC: 0.64 MG/DL
EGFR: 116 ML/MIN/1.73M2
GLUCOSE SERPL-MCNC: 86 MG/DL
POTASSIUM SERPL-SCNC: 4.8 MMOL/L
PROT SERPL-MCNC: 6.6 G/DL
SODIUM SERPL-SCNC: 138 MMOL/L

## 2024-12-06 NOTE — PROGRESS NOTE ADULT - ATTENDING COMMENTS
Stable postpartum. Consent for circumcision signed after questions answered.
Pt doing well, recovering well from rpt C/S, now POD#2.  OK for D/C home with F/U in the office next week.
None

## 2024-12-12 ENCOUNTER — APPOINTMENT (OUTPATIENT)
Dept: GASTROENTEROLOGY | Facility: CLINIC | Age: 39
End: 2024-12-12

## 2024-12-14 NOTE — OB RN PATIENT PROFILE - POST PARTUM DEPRESSION SCREEN OB 4
History:  Past Medical History:   Diagnosis Date    Dizziness     GERD     Gout     High cholesterol     Insomnia     Irregular heart rhythm     Low vitamin D level     Lung cancer     Lung nodule     Mixed hyperlipidemia     Neuropathy     Obesity     Seasonal allergies     Sleep apnea     cipap    Stroke 2012    Vertigo      Past Surgical History:   Procedure Laterality Date    APPENDECTOMY       SECTION      CHOLECYSTECTOMY      COLONOSCOPY      COLONOSCOPY N/A 2024    Procedure: COLONOSCOPY;  Surgeon: Terry Ugarte MD;  Location: [facility] ENDOSCOPY;  Service: Endoscopy;  Laterality: N/A;    HERNIA REPAIR      INSERTION OF TUNNELED CENTRAL VENOUS CATHETER (CVC) WITH SUBCUTANEOUS PORT N/A 2024    Procedure: [id number];  Surgeon: Luz Elena Elder MD;  Location: [facility] OR;  Service: General;  Laterality: N/A;    LOBECTOMY Right 3/7/2024    Procedure: LOBECTOMY;  Surgeon: Naty Middleton MD;  Location: [facility] OR;  Service: Thoracic;  Laterality: Right;  upper and middle lobectomy    THORACOSCOPIC WEDGE RESECTION OF LUNG Right 3/7/2024    Procedure: VATS, WITH WEDGE RESECTION, LUNG;  Surgeon: Naty Middleton MD;  Location: [facility] OR;  Service: Thoracic;  Laterality: Right;  RIGHT VATS // POSS LOBECTOMY    THORACOTOMY Right 3/7/2024    Procedure: THORACOTOMY;  Surgeon: Naty Middleton MD;  Location: [facility] OR;  Service: Thoracic;  Laterality: Right;  converted to open at 1011      Social History     Socioeconomic History    Marital status:    Tobacco Use    Smoking status: Former     Average packs/day: 2.0 packs/day for 15.0 years (30.0 ttl pk-yrs)     Types: Cigarettes     Start date: 2023     Quit date: 1972     Years since quittin.9    Smokeless tobacco: Never    Tobacco comments:     Smoked since age 13; quit 2023   Substance and Sexual Activity    Alcohol use: Never    Drug use: Never    Sexual activity: Yes     Partners: Male      Family History   Problem  Relation Name Age of Onset    Cancer Mother Alesia     Heart disease Mother Alesia     Breast cancer Mother Alesia     Cancer Father Epi         Reason for Follow-up:  -adenocarcinoma right upper lung lobe, S/P right VATS/right upper lobe wedge resection/completion right upper lobectomy and right middle lobectomy and regional lymph node dissection 03/07/2024, G3, pT3 pN0  -Cologuard positive  -anemia of chronic disease  -folic acid deficiency        Oncologic/Hematologic History:  Oncology History   Primary adenocarcinoma of upper lobe of right lung   3/7/2024 Cancer Staged    Staging form: Lung, AJCC 8th Edition  - Pathologic stage from 3/7/2024: Stage IIB (pT3, pN0, cM0)     4/15/2024 Initial Diagnosis    Primary adenocarcinoma of upper lobe of right lung     4/29/2024 - 7/2/2024 Chemotherapy    Treatment Summary   Plan Name: OP NSCLC PEMETREXED + CISPLATIN Q3W  Treatment Goal: Curative  Status: Inactive  Start Date: 4/29/2024  End Date: 7/2/2024  Provider: Emery Mahoney MD  Chemotherapy: CISplatin (Platinol) 132 mg in sodium chloride 0.9% 697 mL chemo infusion, 141 mg, Intravenous, Clinic/HOD 1 time, 4 of 4 cycles  Administration: 132 mg (4/29/2024), 128 mg (6/10/2024), 128 mg (7/1/2024), 132 mg (5/20/2024)  PEMEtrexed disodium (ALIMTA) 900 mg in sodium chloride 0.9% SolP 100 mL chemo infusion, 950 mg, Intravenous, Clinic/HOD 1 time, 4 of 4 cycles  Dose modification: 375 mg/m2 (75 % of original dose 500 mg/m2, Cycle 4, Reason: Dose Not Tolerated)  Administration: 900 mg (4/29/2024), 900 mg (6/10/2024), 625 mg (7/1/2024), 900 mg (5/20/2024)     8/9/2024 -  Chemotherapy    Treatment Summary   Plan Name: OP ATEZOLIZUMAB Q3W  Treatment Goal: Curative  Status: Active  Start Date: 8/9/2024  End Date: 7/11/2025 (Planned)  Provider: Emery Mahoney MD  Chemotherapy: [No matching medication found in this treatment plan]     Social history:  .  Lives in [place].  Has 3 children.  Does not work.  Has been  smoking 1-2 pack of cigarettes daily for 51 years, since age 13; discontinued recently.  No alcohol or illicit drug abuse.      Family history:   Mother experienced some kind of intrathoracic malignancy at age 83;  from MI at age 83   Father  from prostate cancer) experienced at age 83 and probably, sarcoma) experienced at age 83)    Health maintenance:   -PCP in [place]  -says that she had screening colonoscopy performed in [place] in , and that it was unremarkable  -says that now, for positive Cologuard test, she is scheduled for colonoscopy in May 2024  -2023:  Cologuard positive  -2023:  Bilateral digital screening mammogram with tomosynthesis (comparison:  2022 mammogram, etc.):  BI-RADS: 1 negative  =========================================    65-year-old female, referred from [facility], Dr. Arnulfo Middleton, with poorly-differentiated adenocarcinoma of right lung.      Investigations reviewed:  -2022: CT chest lung screening low-dose (comparison:  2020):  Lung rads 2: Benign appearance or behavior:  Continue annual screening with LDCT in 12 months  -2023:  CT chest lung screening low-dose without contrast (comparison:  2022):  Lung rads category 4A: Suspicious; enlarging nodule right upper lobe, lobulated, < 8 mm, i.e., 7 x 6.7 mm, previously 5 mm, previously not lobulated, too small for adequate detection on PET-CT; recommend three-month follow-up)  -2023: CT chest with contrast (comparison:  2023):  Lung rads 4A: Very suspicious; continued enlargement of right upper lobe lung nodule of concern (9.5 x 8.9 mm)  -2024: FDG PET-CT (comparison:  Chest CT 2023; CT abdomen pelvis 2019):  1. Markedly FDG-avid right upper lobe soft tissue nodule, increased in size in the interval, raising concern for malignancy (smoothly marginated noncalcified soft tissue nodule lateral subpleural right upper lobe,  1.2 x 1.1 cm, maximum SUV 14, previously 1 cm x 0.9 cm).  2. No definite PET-CT findings to suggest additional right hemithoracic or more distant metastatic disease.  -02/01/2024: CT chest without contrast (comparison:  12/26/2023): Lateral right upper lobe nodule continues to enlarge in size currently measuring 11 mm concerning for malignancy. Recommend further evaluation.   -03/07/2024:  Right VATS, right upper lobe wedge resection; right upper lobectomy, right middle lobectomy, regional lymph node dissection:  1. Right lung, upper lobe, wedge resection:  Poorly-differentiated adenocarcinoma, 0.6 cm, clear margins of resection  2. Level 8R lymph node (paraesophageal), biopsy:  3 lymph nodes, negative for metastatic carcinoma    3. Level 4R lymph node (lower paratracheal), biopsy: 2 lymph nodes, negative for metastatic carcinoma  4. Level 7R lymph node (subcarinal), biopsy:  1 lymph node, negative for metastatic carcinoma  5. Level 11R lymph node (interlobar), biopsy:  2 lymph nodes, negative for metastatic carcinoma    6. Right lung, upper lobe, completion lobectomy:  -poorly-differentiated adenocarcinoma, 0.8 cm  -bronchial and vascular margins of resection negative   -1 hilar lymph node with no evidence of metastatic carcinoma  7. Level 12R lymph node (lobar), lymphadenectomy:  1 lymph node, negative for metastatic carcinoma    Synoptic report:  Total number of primary tumors: 2  S/P wedge resection  Completion lobectomy  Right lung  Separate tumor nodules (metastases) in same lobe, therefore, pT3  number of intrapulmonary metastases:  2   Tumor site:  Upper lobe of lung  Tumor size:  Total tumor size:  Greatest dimension:  0.8 cm  Invasive acinar adenocarcinoma; G3, poorly-differentiated; spread through airspaces (CHAMP), present; no visceral pleural invasion; no adjacent structures present; no known pre-surgical therapy; no LVI  All margins negative for invasive carcinoma; closest margin invasive carcinoma,  bronchial vascular; distance from invasive carcinoma closest margin,> 2 cm; margins status for noninvasive tumor, all margins negative for noninvasive tumor  Number of lymph nodes examined, 10; all regional lymph nodes negative for tumor  >>>  pT3 pN0    PD-L1 expression:  Positive: High (TPS 95%; )  Negative for EGFR, KRAS, BRAF, AL K, ROS1, RET, MET, HER2  No gene rearrangement no reportable altered splicing events identified from RNA sequencing  No reportable pathogenic variants found  MSI stable  TMB:  41.6m/MB  Fusions: Negative    04/16/2024:  Pleasant lady who presents for initial medical oncology consultation, accompanied by .  In no acute discomfort.  After lobectomy, has required supplemental oxygen via nasal cannula at 2 liters/minute.  This is being handled by home healthcare team.  Prior to surgery, she never required supplemental oxygen.  Prior to surgery, she never experienced significant cough, sputum production, or dyspnea.  Great appetite.  Mild exertional dyspnea.  ECOG 1.    No unusual headaches or focal neurological symptoms.  No hemoptysis, fevers, or chills.  No abdominal pain, nausea, vomiting, change in bowel habits, or GI bleeding.  No anorexia or unintentional weight loss.  Smoked 1-2 packs of cigarettes daily for 51 years since age 13; says that she discontinued smoking after lung surgery.    Interval History:    11/21/24  Ms. Geronimo presents for follow-up of her lung cancer and cycle 6 of Atezolizumab.She reports feeling well overall. She was seen by pulmonary 2 weeks ago for follow-up. She continues on 2L NC which is stable. She has good appetite. She ambulates with a walker. She denies She denies headaches, dizziness, blurred vision, hemoptysis, CP, constipation, diarrhea or fevers.     10/31/24  Ms. Geronimo presents for follow-up of her lung cancer and cycle 5 of Atezolizumab. She reports feeling well. She continues with the use of 2L NC at home. She will be seen by  Pulmonary by 11/25/24. She had a positive cologaurd test and will be seeing GI in December. She has great appetite and energy. She denies headaches, dizziness, blurred vision, hemoptysis, CP or fevers.     10/10/2024:   -07/22/2024: Hemoglobin 7.7; MCV normal; serum iron normal, TIBC normal; ferritin 1216, elevated; transferrin saturation 34%, normal; folic acid level 6.1, low; B12 level 1995, elevated; retic count 1.9%; haptoglobin normal; , elevated  (Anemia of chronic disease/inflammation/malignancy)  -07/22/2024: Started folic acid 1 mg daily  -hemoglobin dropped to 6.8 on 08/08/2024, requiring PRBC x2 units  -07/22/2024: TSH, free T4 normal  -09/20/2024: TSH, free T4 normal  -adjuvant atezolizumab 1200 mg every 3 weeks, started 08/09/2024; cycle 2 on 08/30/2024; cycle 3 on 09/20/2024  -09/18/2024:  Surveillance CT chest with contrast (comparison: CT C/A/P 0 05/06/2024):  No evidence of local recurrence or metastatic disease in chest  -10/10/2024: Hemoglobin 10.4, stable; MCV 98.2, rest of CBC essentially unremarkable; creatinine 1.14, stable; rest of CMP unremarkable  Presents for a follow-up visit, accompanied by a male family member.  In no acute discomfort.  Has been on oxygen ever since diagnosis of lung cancer.  Says that she does not smoke anymore.  She smoked 1-2 pack of cigarettes daily for 51 years, since age 13.  Great appetite.  Denies unusual headaches, focal neurological symptoms, significant chest pain/cough/hemoptysis/mucous production/dyspnea, abdominal pain, nausea, vomiting, etc..  No new lumps or lymphadenopathy.  No anorexia or unintentional weight loss.  No recurrent bouts of pneumonia or bronchitis.  No immune related adverse events with ongoing consolidation therapy with Tecentriq in the form of immune pneumonitis, myocarditis, hepatitis, nephritis, endocrinopathies, dermatitis, colitis, vision impairment, allergic reactions, etc..  ECOG 1.  Ambulates with the help of  walker.    Interval History- 12/16/2024:   -S/P Tecentriq cycle 6 on November 21, 2024  -S/P colonoscopy December 12, 2024.  - On this visit the patient presents to the office in the company of her male .  She is doing well and denies any major significant complaints.  On  specific questioning the patient denies any chest pain hemoptysis, hoarseness, changes on baseline shortness of breath or any other issues at all.  She denies any side effects associated with the use of immunotherapy    Medications:  Current Outpatient Medications on File Prior to Visit   Medication Sig Dispense Refill    allopurinoL (ZYLOPRIM) 100 MG tablet Take 100 mg by mouth once daily.      aspirin (ECOTRIN) 81 MG EC tablet Take 81 mg by mouth once daily.      carvediloL (COREG) 12.5 MG tablet Take 12.5 mg by mouth 2 (two) times daily.      colchicine (COLCRYS) 0.6 mg tablet Take 0.6 mg by mouth once daily.      dexAMETHasone (DECADRON) 4 MG Tab Take Dexamethasone 8mg daily the day before treatment, treatment day, and 3 days after. 120 tablet 3    diphenhydrAMINE-aluminum-magnesium hydroxide-simethicone-LIDOcaine viscous HCl 2% Swish and spit 15 mLs every 4 (four) hours as needed (oral mucositis). 300 each 2    ENTRESTO 24-26 mg per tablet Take 1 tablet by mouth 2 (two) times daily.      ergocalciferol (ERGOCALCIFEROL) 50,000 unit Cap Take 50,000 Units by mouth every 7 days.      folic acid (FOLVITE) 1 MG tablet Take 1 tablet (1,000 mcg total) by mouth once daily. 30 tablet 0    furosemide (LASIX) 40 MG tablet Take 40 mg by mouth once daily.      gabapentin (NEURONTIN) 800 MG tablet Take 800 mg by mouth 2 (two) times daily.      ibuprofen (ADVIL,MOTRIN) 800 MG tablet 1 tablet with food or milk as needed Orally Three times a day      linaCLOtide (LINZESS) 145 mcg Cap capsule Take 290 mcg by mouth as needed.      meclizine (ANTIVERT) 25 mg tablet Take 25 mg by mouth Daily.      megestroL (MEGACE) 40 MG Tab Take 1 tablet (40 mg total)  by mouth 4 (four) times daily. 120 tablet 2    mirtazapine (REMERON) 30 MG tablet Take 30 mg by mouth every evening.      montelukast (SINGULAIR) 10 mg tablet Take 10 mg by mouth once daily.      nortriptyline (PAMELOR) 25 MG capsule Take 25 mg by mouth Daily.      ondansetron (ZOFRAN) 4 MG tablet Take 1 tablet (4 mg total) by mouth daily as needed for Nausea. 30 tablet 1    pantoprazole (PROTONIX) 40 MG tablet Take 40 mg by mouth once daily.      polyethylene glycol (MOVIPREP) 100-7.5-2.691 gram solution Take as directed prior to colonoscopy 1 kit 0    potassium chloride (K-TAB) 20 mEq Take 1 tablet (20 mEq total) by mouth 2 (two) times daily. 28 tablet 0    prochlorperazine (COMPAZINE) 10 MG tablet Take 1 tablet (10 mg total) by mouth 4 (four) times daily. 30 tablet 1    ALPRAZolam (XANAX) 0.5 MG tablet Take 1 tablet (0.5 mg total) by mouth once as needed for Anxiety. Take at 8:30 am on day of scans (Patient not taking: Reported on 11/21/2024) 1 tablet 0    atorvastatin (LIPITOR) 40 MG tablet Take 40 mg by mouth once daily.       Current Facility-Administered Medications on File Prior to Visit   Medication Dose Route Frequency Provider Last Rate Last Admin    0.9%  NaCl infusion (for blood administration)   Intravenous Once Emery Mahoney MD        0.9% NaCl 100 mL flush bag   Intravenous PRN Emery Mahoney MD        alteplase injection 2 mg  2 mg Intra-Catheter PRN Emery Mahoney MD   2 mg at 10/11/24 1037    diphenhydrAMINE injection 50 mg  50 mg Intravenous Once PRN Emery Mahoney MD        EPINEPHrine injection 0.3 mg  0.3 mg Intramuscular Once PRN Emery Mahoney MD        heparin, porcine (PF) 100 unit/mL injection flush 500 Units  500 Units Intravenous PRN Emery Mahoney MD        hydrocortisone sodium succinate injection 100 mg  100 mg Intravenous Once PRN Emery Mahoney MD        lactated ringers infusion   Intravenous Continuous Aline Muniz MD 10 mL/hr at 04/26/24 0608 New Bag  at 04/26/24 0608    sodium chloride 0.9% flush 10 mL  10 mL Intravenous PRN Emery Mahoney MD         Review of System  Review of Systems   Constitutional: Negative.    HENT:  Negative.     Eyes: Negative.    Respiratory: Negative.     Cardiovascular: Negative.    Endocrine: Negative.    Genitourinary: Negative.     Musculoskeletal: Negative.    Skin: Negative.    Neurological: Negative.    Hematological: Negative.    Psychiatric/Behavioral: Negative.         Physical Exam  Physical Exam  Vitals and nursing note reviewed.   Constitutional:       Appearance: Normal appearance.   HENT:      Head: Normocephalic and atraumatic.   Eyes:      General: No scleral icterus.     Conjunctiva/sclera: Conjunctivae normal.   Cardiovascular:      Rate and Rhythm: Normal rate and regular rhythm.      Heart sounds: Normal heart sounds.   Pulmonary:      Effort: Pulmonary effort is normal. No respiratory distress.      Comments: Wearing oxygen 2 L  Abdominal:      General: Bowel sounds are normal. There is no distension.      Palpations: Abdomen is soft.      Tenderness: There is no abdominal tenderness.   Musculoskeletal:         General: Normal range of motion.   Skin:     General: Skin is warm and dry.   Neurological:      General: No focal deficit present.      Mental Status: She is alert and oriented to person, place, and time.   Psychiatric:         Mood and Affect: Mood normal.         Behavior: Behavior normal.         Thought Content: Thought content normal.         Judgment: Judgment normal.         Visit Labs       12/16/24 0922    Comprehensive Metabolic Panel  Collected: 12/16/24 0849  Final result  Specimen: Blood    Sodium 139 mmol/L Globulin 3.2 gm/dL   Potassium 3.1 Low  mmol/L Albumin/Globulin Ratio 1.2 ratio   Chloride 103 mmol/L Bilirubin Total 0.4 mg/dL   CO2 30 mmol/L  unit/L   Glucose 123 High  mg/dL ALT 14 unit/L   Blood Urea Nitrogen 14.9 mg/dL AST 13 unit/L   Creatinine 0.99 mg/dL eGFR >60  mL/min/1.73/m2   Calcium 9.6 mg/dL Anion Gap 6.0 mEq/L   Protein Total 7.0 gm/dL BUN/Creatinine Ratio 15   Albumin 3.8 g/dL            12/16/24 0909    CBC with Differential  Collected: 12/16/24 0849  Final result  Specimen: Blood    WBC 6.20 x10(3)/mcL Mono % 10.6 %   RBC 3.56 Low  x10(6)/mcL Eos % 1.6 %   Hgb 11.0 Low  g/dL Basophil % 0.3 %   Hct 33.6 Low  % Lymph # 1.74 x10(3)/mcL   MCV 94.4 High  fL Neut # 3.66 x10(3)/mcL   MCH 30.9 pg Mono # 0.66 x10(3)/mcL   MCHC 32.7 Low  g/dL Eos # 0.10 x10(3)/mcL   RDW 13.1 % Baso # 0.02 x10(3)/mcL   Platelet 201 x10(3)/mcL IG# 0.02 x10(3)/mcL   MPV 9.0 fL IG% 0.3 %   Neut % 59.1 % NRBC% 0.0 %   Lymph % 28.1 %               VITAL SIGNS:   Vitals:    12/16/24 0901   BP: (!) 131/93   Pulse: 77   Resp: 16   Temp: 97.3 °F (36.3 °C)       Assessment:  Problem List Items Addressed This Visit       Primary adenocarcinoma of upper lobe of right lung - Primary    Relevant Orders    CBC Auto Differential    Comprehensive Metabolic Panel    Lactate Dehydrogenase    TSH    T4, Free    Hypokalemia    Relevant Orders    CBC Auto Differential    Comprehensive Metabolic Panel    Lactate Dehydrogenase    TSH    T4, Free    Anemia of chronic disease    Relevant Orders    CBC Auto Differential    Comprehensive Metabolic Panel    Lactate Dehydrogenase    TSH    T4, Free       Orders for 11/21/2024:   Continue Tecentriq every 3 weeks for a total of 17 cycles. Proceed with cycle 6 today.  Check TSH and free T4 every 6 weeks  Surveillance CT chest with contrast in March 2025  She has been referred to GI for colonoscopy for evaluation of positive Cologuard test, She states she has an appointment in December 2025.   Follow-up with NP in 3 weeks     Above discussed at length with the patient.  All questions answered.  Discussed labs and scans and gave her copies of relevant results.  She understands and agrees with this plan.  =================================    Adenocarcinoma right upper lung  lobe:   -LDCT chest without contrast 08/12/2022:  Lung rads 2  -LDCT chest without contrast 09/11/2023:  Lung rads 4A  -noncontrast chest CT 12/26/2023:  Lung rads 4A  -FDG PET-CT 01/29/2024:  Hypermetabolic right upper lobe nodule, 1.2 x 1.1 cm, maximum SUV 14, previously 1 cm x 0.9 cm; no distant metastases   -noncontrast chest CT 02/01/2024:  Right upper lobe nodule continues to enlarge, now 11 mm   -03/07/2024:  Right VATS, right upper lobe wedge resection, right upper lobectomy, right middle lobectomy, regional lymph node dissection:  -separate tumor nodules (metastases) in same lobe, right upper lung lobe, therefore, pT3 (0.8 cm and 0.6 cm, respectively); invasive acinar adenocarcinoma; G3, poorly-differentiated; no visceral pleural invasion; no LVI; margins negative; 10 regional lymph nodes negative  -pT3 pN0 M0, stage IIB  -PD-L1 expression high positive (TPS 95%; )  -negative for EGFR, KRAS, BRAF, AL K, ROS1, RET, met, HER2  -MediPort placed 04/25/2024  -S/P adjuvant cisplatin/Alimta every 3 weeks x4 cycles (04/29/2024-07/01/2024)  -restaging CTs C/A/P 05/06/2024:  No metastases   -staging brain MRI 05/08/2024: No metastases  -hemoglobin dropped to 5.2 on 07/10/2024, requiring blood transfusion  -ANC dropped to 0.55 on 06/24/2024, requiring supportive care  -07/17/2024:  ANC 0.525; hemoglobin 7.0; iron studies consistent with anemia of chronic disease/inflammation/malignancy/chemotherapy  -07/22/2024: TSH, free T4 normal  -adjuvant atezolizumab 1200 mg every 3 weeks, started 08/09/2024; cycle 2 on 08/30/2024; cycle 3 on 09/20/2024  -09/20/2024: TSH, free T4 normal  -surveillance CT chest 09/18/2024: MALLORY  >>>  Plan:  -continue adjuvant atezolizumab 1200 mg IV every 3 weeks x1 year (17 cycles)   -monitor for immune related adverse events with atezolizumab  -check baseline TSH and free T4 every 6 weeks to monitor for the possibility of immune thyroiditis with atezolizumab  -for surveillance, CT chest  with contrast in 6 months (March 2025)    Monitoring on atezolizumab:  Monitor LFTs (AST, ALT, and total bilirubin; at baseline and periodically during treatment;  kidney function (serum creatinine; at baseline and periodically during treatment);  thyroid function (at baseline, periodically during treatment and as clinically indicated);  monitor blood glucose (for hyperglycemia);  Monitor closely for signs/symptoms of immune-mediated adverse reactions, including  adrenal insufficiency,  diarrhea/colitis (consider initiating or repeating infectious workup in patients with corticosteroid-refractory immune-mediated colitis to exclude alternative causes),  dermatologic toxicity,  diabetes mellitus,  hypophysitis,  ocular disorders,  thyroid disorders,  pneumonitis and other immune-mediated adverse reactions.  Monitor for signs/symptoms of infusion-related reactions.     Surveillance:  -history and physical and chest CT +/- contrast every 6 months X 3 years (03/2024-03/2027), then history and physical and low-dose noncontrast chest CT annually  -smoking cessation advice and counseling   -FDG PET-CT or brain MRI is not routinely indicated      Severe anemia:   (Anemia of chronic disease/inflammation/malignancy)  -07/22/2024: Hemoglobin 7.7; MCV normal (anemia of chronic disease/inflammation/malignancy)  -07/22/2024: Started on folic acid 1 mg daily  -08/08/2024: Hemoglobin dropped to 6.8, requiring PRBC x3 units  -hemoglobin: 9.3 on 09/20/2024; 9.3 on 08/30/2024; 9.7 on 08/19/2024, etc.    Hypokalemia:   -start potassium 40 mEq x7 days (12/16/2024)    Cologuard positive:  -08/17/2023: Cologuard positive  -colonoscopy performed December 12, 2024- negative for cancer follow-up in 5 years     Discussion & Plan   12.16.2024    1.Dx:  Lung cancer  The patient will continue treatment with Tecentriq every 3 weeks.  The plan is to complete 17 cycles.  I reviewed with the patient side effects associated with the use of  atezolizumab.  The patient does not have any signs of immune mediated adverse reactions  Liver function tests within normal limits  Renal function as monitor by creatinine and EGFR within normal limits  Thyroid functions monitor by TSH and free T4 within normal limits  I provided education to the patient to report any new or worsening symptoms such as cough, chest pain, shortness of breath, diarrhea, jaundice, severe abdominal pain, or unusual swelling immediately.   Patient will proceed today with cycle 7  Discussed with the patient signs and symptoms to report  Patient will be due for the next scan evaluation, surveillance CT of the chest with contrast on March of 2025.  2.Dx:  Anemia  Hemoglobin today =11.0 stable  Patient was found to have an anemia with a positive Cologuard back on August 17, 2023.  She was referred to GI for colonoscopy which was performed December 12, 2024.  Report not  available for my review just yet.  History negative colonoscopy.  Repeat in 5 years  We will continue to monitor.  3.Dx:  Hypokalemia      Patient's potassium = 3.1  Patient already taking potassium 20 mEq because she is taking Lasix.  I instructed the patient to increase the potassium to 40 mEq a day for the next 7 days.  4.Dx: Port dysfunction.  Nurses were having issue with the drop from the port.  If this issues continues we will order a port study.    Patient instructions and visit orders.     -Follow-up:  Follow-up with NP January 6, 2025  -Labs:  CBC, CMP, LDH, TSH, free T4 prior to the next visit-January 6, 2025  -Imaging:    -Other orders:  Cycle 8-atezolizumab treatment-January 6, 2025.  Follow-up:  Follow up in about 18 days (around 1/3/2025).         no

## 2025-01-05 ENCOUNTER — NON-APPOINTMENT (OUTPATIENT)
Age: 40
End: 2025-01-05

## 2025-01-16 ENCOUNTER — APPOINTMENT (OUTPATIENT)
Dept: GASTROENTEROLOGY | Facility: CLINIC | Age: 40
End: 2025-01-16
Payer: COMMERCIAL

## 2025-01-16 VITALS
RESPIRATION RATE: 14 BRPM | BODY MASS INDEX: 20.91 KG/M2 | WEIGHT: 118 LBS | HEIGHT: 63 IN | DIASTOLIC BLOOD PRESSURE: 86 MMHG | TEMPERATURE: 97.3 F | OXYGEN SATURATION: 98 % | SYSTOLIC BLOOD PRESSURE: 127 MMHG | HEART RATE: 72 BPM

## 2025-01-16 DIAGNOSIS — Z09 ENCOUNTER FOR FOLLOW-UP EXAMINATION AFTER COMPLETED TREATMENT FOR CONDITIONS OTHER THAN MALIGNANT NEOPLASM: ICD-10-CM

## 2025-01-16 PROCEDURE — 99213 OFFICE O/P EST LOW 20 MIN: CPT

## 2025-06-03 NOTE — OB PST NOTE - NS_PRENTALCLSTYPE_OBGYN_ALL_OB
Twin fetal echocardiogram is overall reassuring without concerns for a congenital heart defect.  There is no evidence of evolving twin-twin transfusion.  The patient was informed of today's findings and all of her questions answered to apparent satisfaction.  A growth ultrasound is scheduled in 2 weeks and I recommend she schedule a twin-twin transfusion evaluation in 4 weeks.    Thank you very much for allowing us to participate in the care of this very nice patient.  Should you have any questions, please do not hesitate to contact our office.  
Yes

## 2025-06-12 NOTE — OB RN PATIENT PROFILE - PURPOSEFUL PROACTIVE ROUNDING
Critical PTT of 93 called to writer at 0438 from lab. Per subsequent chart for heparin gtt, hold for 2 hours and resume at 3u/kg/hr less than previous rate. Held at 0444. Will resume at 0644 at 15U/kg/hr.    Patient
